# Patient Record
Sex: FEMALE | Race: WHITE | NOT HISPANIC OR LATINO | Employment: OTHER | URBAN - NONMETROPOLITAN AREA
[De-identification: names, ages, dates, MRNs, and addresses within clinical notes are randomized per-mention and may not be internally consistent; named-entity substitution may affect disease eponyms.]

---

## 2018-12-17 ENCOUNTER — APPOINTMENT (OUTPATIENT)
Dept: CT IMAGING | Facility: HOSPITAL | Age: 83
End: 2018-12-17

## 2018-12-17 ENCOUNTER — HOSPITAL ENCOUNTER (OUTPATIENT)
Facility: HOSPITAL | Age: 83
Setting detail: OBSERVATION
Discharge: HOME HEALTH | End: 2018-12-19
Attending: EMERGENCY MEDICINE | Admitting: EMERGENCY MEDICINE

## 2018-12-17 ENCOUNTER — APPOINTMENT (OUTPATIENT)
Dept: GENERAL RADIOLOGY | Facility: HOSPITAL | Age: 83
End: 2018-12-17

## 2018-12-17 DIAGNOSIS — R41.0 DISORIENTATION: ICD-10-CM

## 2018-12-17 DIAGNOSIS — N39.0 ACUTE UTI: Primary | ICD-10-CM

## 2018-12-17 LAB
A-A DO2: 20.4 MMHG (ref 0–300)
ALBUMIN SERPL-MCNC: 4.2 G/DL (ref 3.4–4.8)
ALBUMIN/GLOB SERPL: 1.7 G/DL (ref 1.5–2.5)
ALP SERPL-CCNC: 64 U/L (ref 35–104)
ALT SERPL W P-5'-P-CCNC: 17 U/L (ref 10–36)
ANION GAP SERPL CALCULATED.3IONS-SCNC: 6.1 MMOL/L (ref 3.6–11.2)
ARTERIAL PATENCY WRIST A: ABNORMAL
AST SERPL-CCNC: 26 U/L (ref 10–30)
ATMOSPHERIC PRESS: 730 MMHG
BACTERIA UR QL AUTO: ABNORMAL /HPF
BASE EXCESS BLDA CALC-SCNC: 1.4 MMOL/L
BASOPHILS # BLD AUTO: 0.01 10*3/MM3 (ref 0–0.3)
BASOPHILS NFR BLD AUTO: 0.1 % (ref 0–2)
BDY SITE: ABNORMAL
BILIRUB SERPL-MCNC: 1.3 MG/DL (ref 0.2–1.8)
BILIRUB UR QL STRIP: NEGATIVE
BNP SERPL-MCNC: 257 PG/ML (ref 0–100)
BODY TEMPERATURE: 98.6 C
BUN BLD-MCNC: 21 MG/DL (ref 7–21)
BUN/CREAT SERPL: 21.4 (ref 7–25)
CALCIUM SPEC-SCNC: 9.2 MG/DL (ref 7.7–10)
CHLORIDE SERPL-SCNC: 104 MMOL/L (ref 99–112)
CLARITY UR: ABNORMAL
CO2 SERPL-SCNC: 27.9 MMOL/L (ref 24.3–31.9)
COHGB MFR BLD: 2.7 % (ref 0–5)
COLOR UR: YELLOW
CREAT BLD-MCNC: 0.98 MG/DL (ref 0.43–1.29)
DEPRECATED RDW RBC AUTO: 45.1 FL (ref 37–54)
EOSINOPHIL # BLD AUTO: 0.01 10*3/MM3 (ref 0–0.7)
EOSINOPHIL NFR BLD AUTO: 0.1 % (ref 0–7)
ERYTHROCYTE [DISTWIDTH] IN BLOOD BY AUTOMATED COUNT: 13.7 % (ref 11.5–14.5)
GFR SERPL CREATININE-BSD FRML MDRD: 54 ML/MIN/1.73
GLOBULIN UR ELPH-MCNC: 2.5 GM/DL
GLUCOSE BLD-MCNC: 106 MG/DL (ref 70–110)
GLUCOSE UR STRIP-MCNC: NEGATIVE MG/DL
HCO3 BLDA-SCNC: 24.9 MMOL/L (ref 22–26)
HCT VFR BLD AUTO: 36.5 % (ref 37–47)
HCT VFR BLD CALC: 36 % (ref 37–47)
HETEROPH AB SER QL LA: NEGATIVE
HGB BLD-MCNC: 11.7 G/DL (ref 12–16)
HGB BLDA-MCNC: 12.3 G/DL (ref 12–16)
HGB UR QL STRIP.AUTO: NEGATIVE
HOROWITZ INDEX BLD+IHG-RTO: 21 %
HYALINE CASTS UR QL AUTO: ABNORMAL /LPF
IMM GRANULOCYTES # BLD: 0.01 10*3/MM3 (ref 0–0.03)
IMM GRANULOCYTES NFR BLD: 0.1 % (ref 0–0.5)
KETONES UR QL STRIP: ABNORMAL
LEUKOCYTE ESTERASE UR QL STRIP.AUTO: ABNORMAL
LYMPHOCYTES # BLD AUTO: 1.2 10*3/MM3 (ref 1–3)
LYMPHOCYTES NFR BLD AUTO: 15.1 % (ref 16–46)
MCH RBC QN AUTO: 30 PG (ref 27–33)
MCHC RBC AUTO-ENTMCNC: 32.1 G/DL (ref 33–37)
MCV RBC AUTO: 93.6 FL (ref 80–94)
METHGB BLD QL: 0.3 % (ref 0–3)
MODALITY: ABNORMAL
MONOCYTES # BLD AUTO: 0.78 10*3/MM3 (ref 0.1–0.9)
MONOCYTES NFR BLD AUTO: 9.8 % (ref 0–12)
NEUTROPHILS # BLD AUTO: 5.92 10*3/MM3 (ref 1.4–6.5)
NEUTROPHILS NFR BLD AUTO: 74.8 % (ref 40–75)
NITRITE UR QL STRIP: NEGATIVE
OSMOLALITY SERPL CALC.SUM OF ELEC: 279.1 MOSM/KG (ref 273–305)
OXYHGB MFR BLDV: 93.5 % (ref 85–100)
PCO2 BLDA: 35.6 MM HG (ref 35–45)
PH BLDA: 7.46 PH UNITS (ref 7.35–7.45)
PH UR STRIP.AUTO: 7 [PH] (ref 5–8)
PLATELET # BLD AUTO: 152 10*3/MM3 (ref 130–400)
PMV BLD AUTO: 10.3 FL (ref 6–10)
PO2 BLDA: 80.4 MM HG (ref 80–100)
POTASSIUM BLD-SCNC: 4 MMOL/L (ref 3.5–5.3)
PROT SERPL-MCNC: 6.7 G/DL (ref 6–8)
PROT UR QL STRIP: NEGATIVE
RBC # BLD AUTO: 3.9 10*6/MM3 (ref 4.2–5.4)
RBC # UR: ABNORMAL /HPF
REF LAB TEST METHOD: ABNORMAL
S PYO AG THROAT QL: NEGATIVE
SAO2 % BLDCOA: 96.4 % (ref 90–100)
SODIUM BLD-SCNC: 138 MMOL/L (ref 135–153)
SP GR UR STRIP: 1.02 (ref 1–1.03)
SQUAMOUS #/AREA URNS HPF: ABNORMAL /HPF
TROPONIN I SERPL-MCNC: 0.03 NG/ML
UROBILINOGEN UR QL STRIP: ABNORMAL
WBC NRBC COR # BLD: 7.93 10*3/MM3 (ref 4.5–12.5)
WBC UR QL AUTO: ABNORMAL /HPF

## 2018-12-17 PROCEDURE — 94799 UNLISTED PULMONARY SVC/PX: CPT

## 2018-12-17 PROCEDURE — 87040 BLOOD CULTURE FOR BACTERIA: CPT | Performed by: EMERGENCY MEDICINE

## 2018-12-17 PROCEDURE — G0378 HOSPITAL OBSERVATION PER HR: HCPCS

## 2018-12-17 PROCEDURE — 80053 COMPREHEN METABOLIC PANEL: CPT | Performed by: EMERGENCY MEDICINE

## 2018-12-17 PROCEDURE — 82375 ASSAY CARBOXYHB QUANT: CPT | Performed by: EMERGENCY MEDICINE

## 2018-12-17 PROCEDURE — 96365 THER/PROPH/DIAG IV INF INIT: CPT

## 2018-12-17 PROCEDURE — 87880 STREP A ASSAY W/OPTIC: CPT | Performed by: EMERGENCY MEDICINE

## 2018-12-17 PROCEDURE — 70450 CT HEAD/BRAIN W/O DYE: CPT | Performed by: RADIOLOGY

## 2018-12-17 PROCEDURE — 70450 CT HEAD/BRAIN W/O DYE: CPT

## 2018-12-17 PROCEDURE — 84484 ASSAY OF TROPONIN QUANT: CPT | Performed by: EMERGENCY MEDICINE

## 2018-12-17 PROCEDURE — 99284 EMERGENCY DEPT VISIT MOD MDM: CPT

## 2018-12-17 PROCEDURE — 81001 URINALYSIS AUTO W/SCOPE: CPT | Performed by: EMERGENCY MEDICINE

## 2018-12-17 PROCEDURE — 87081 CULTURE SCREEN ONLY: CPT | Performed by: EMERGENCY MEDICINE

## 2018-12-17 PROCEDURE — 85025 COMPLETE CBC W/AUTO DIFF WBC: CPT | Performed by: EMERGENCY MEDICINE

## 2018-12-17 PROCEDURE — 87086 URINE CULTURE/COLONY COUNT: CPT | Performed by: EMERGENCY MEDICINE

## 2018-12-17 PROCEDURE — 82805 BLOOD GASES W/O2 SATURATION: CPT | Performed by: EMERGENCY MEDICINE

## 2018-12-17 PROCEDURE — 36600 WITHDRAWAL OF ARTERIAL BLOOD: CPT | Performed by: EMERGENCY MEDICINE

## 2018-12-17 PROCEDURE — 83050 HGB METHEMOGLOBIN QUAN: CPT | Performed by: EMERGENCY MEDICINE

## 2018-12-17 PROCEDURE — 71046 X-RAY EXAM CHEST 2 VIEWS: CPT | Performed by: RADIOLOGY

## 2018-12-17 PROCEDURE — 83880 ASSAY OF NATRIURETIC PEPTIDE: CPT | Performed by: EMERGENCY MEDICINE

## 2018-12-17 PROCEDURE — 25010000002 CEFTRIAXONE: Performed by: EMERGENCY MEDICINE

## 2018-12-17 PROCEDURE — 93005 ELECTROCARDIOGRAM TRACING: CPT | Performed by: EMERGENCY MEDICINE

## 2018-12-17 PROCEDURE — 71046 X-RAY EXAM CHEST 2 VIEWS: CPT

## 2018-12-17 PROCEDURE — 86308 HETEROPHILE ANTIBODY SCREEN: CPT | Performed by: EMERGENCY MEDICINE

## 2018-12-17 RX ORDER — ATORVASTATIN CALCIUM 10 MG/1
5 TABLET, FILM COATED ORAL DAILY
Status: DISCONTINUED | OUTPATIENT
Start: 2018-12-18 | End: 2018-12-19 | Stop reason: HOSPADM

## 2018-12-17 RX ORDER — NITROGLYCERIN 0.4 MG/1
0.4 TABLET SUBLINGUAL
Status: DISCONTINUED | OUTPATIENT
Start: 2018-12-17 | End: 2018-12-19 | Stop reason: HOSPADM

## 2018-12-17 RX ORDER — ATORVASTATIN CALCIUM 10 MG/1
5 TABLET, FILM COATED ORAL DAILY
Status: CANCELLED | OUTPATIENT
Start: 2018-12-18

## 2018-12-17 RX ORDER — ATENOLOL 25 MG/1
25 TABLET ORAL DAILY
Status: CANCELLED | OUTPATIENT
Start: 2018-12-18

## 2018-12-17 RX ORDER — SODIUM CHLORIDE 0.9 % (FLUSH) 0.9 %
10 SYRINGE (ML) INJECTION AS NEEDED
Status: DISCONTINUED | OUTPATIENT
Start: 2018-12-17 | End: 2018-12-19 | Stop reason: HOSPADM

## 2018-12-17 RX ORDER — ONDANSETRON 4 MG/1
4 TABLET, ORALLY DISINTEGRATING ORAL EVERY 6 HOURS PRN
Status: DISCONTINUED | OUTPATIENT
Start: 2018-12-17 | End: 2018-12-19 | Stop reason: HOSPADM

## 2018-12-17 RX ORDER — SODIUM CHLORIDE 0.9 % (FLUSH) 0.9 %
3-10 SYRINGE (ML) INJECTION AS NEEDED
Status: DISCONTINUED | OUTPATIENT
Start: 2018-12-17 | End: 2018-12-19 | Stop reason: HOSPADM

## 2018-12-17 RX ORDER — ATENOLOL 25 MG/1
25 TABLET ORAL DAILY
Status: ON HOLD | COMMUNITY
End: 2019-02-08

## 2018-12-17 RX ORDER — ONDANSETRON 4 MG/1
4 TABLET, FILM COATED ORAL EVERY 6 HOURS PRN
Status: DISCONTINUED | OUTPATIENT
Start: 2018-12-17 | End: 2018-12-19 | Stop reason: HOSPADM

## 2018-12-17 RX ORDER — ATENOLOL 25 MG/1
25 TABLET ORAL DAILY
Status: DISCONTINUED | OUTPATIENT
Start: 2018-12-18 | End: 2018-12-19 | Stop reason: HOSPADM

## 2018-12-17 RX ORDER — ATORVASTATIN CALCIUM 10 MG/1
5 TABLET, FILM COATED ORAL DAILY
Status: ON HOLD | COMMUNITY
End: 2019-02-08

## 2018-12-17 RX ORDER — ONDANSETRON 2 MG/ML
4 INJECTION INTRAMUSCULAR; INTRAVENOUS EVERY 6 HOURS PRN
Status: DISCONTINUED | OUTPATIENT
Start: 2018-12-17 | End: 2018-12-19 | Stop reason: HOSPADM

## 2018-12-17 RX ORDER — ACETAMINOPHEN 325 MG/1
650 TABLET ORAL EVERY 4 HOURS PRN
Status: DISCONTINUED | OUTPATIENT
Start: 2018-12-17 | End: 2018-12-19 | Stop reason: HOSPADM

## 2018-12-17 RX ORDER — ATORVASTATIN CALCIUM 10 MG/1
10 TABLET, FILM COATED ORAL DAILY
COMMUNITY
End: 2018-12-17

## 2018-12-17 RX ORDER — SODIUM CHLORIDE 0.9 % (FLUSH) 0.9 %
3 SYRINGE (ML) INJECTION EVERY 12 HOURS SCHEDULED
Status: DISCONTINUED | OUTPATIENT
Start: 2018-12-17 | End: 2018-12-19 | Stop reason: HOSPADM

## 2018-12-17 RX ADMIN — CEFTRIAXONE 1 G: 1 INJECTION, POWDER, FOR SOLUTION INTRAMUSCULAR; INTRAVENOUS at 20:10

## 2018-12-17 NOTE — ED PROVIDER NOTES
Subjective   History of Present Illness  TRIAGE CHIEF COMPLAINT:   Chief Complaint   Patient presents with   • Altered Mental Status         HPI: Mary Lou Graves   is a 83 y.o. female   who presents to the emergency department complaining of confusion and increased pedal edema.  Patient with a history of CABG, pacemaker, hypertension, dyslipidemia, Alzheimer's dementia, takes Xarelto.  Patient is a very poor historian.  According to family she has been more confused than her baseline over the past 24 hours.  They have also noticed she has increased swelling of her feet and ankles bilaterally.  On review of systems patient reports a sore throat.  No report of any fevers, chest pain, dyspnea, cough, nausea, vomiting, diarrhea, dysuria.  No recent illness or trauma.            Review of Systems   All other systems reviewed and are negative.      Past Medical History:   Diagnosis Date   • Hyperlipidemia    • Hypertension        No Known Allergies    Past Surgical History:   Procedure Laterality Date   • HYSTERECTOMY         History reviewed. No pertinent family history.    Social History     Socioeconomic History   • Marital status:      Spouse name: Not on file   • Number of children: Not on file   • Years of education: Not on file   • Highest education level: Not on file   Tobacco Use   • Smoking status: Current Every Day Smoker     Packs/day: 0.50   Substance and Sexual Activity   • Alcohol use: No     Frequency: Never   • Drug use: No   • Sexual activity: Defer           Objective   Physical Exam        CONSTITUTIONAL: Awake, alert, appears elderly and frail but comfortable and non-toxic   HENT: Atraumatic, normocephalic, oral mucosa pink and moist, airway patent. Nares patent without drainage. External ears normal.  Very faint erythema of the posterior oropharynx without exudate or swelling  EYES: Conjunctiva clear, EOMI, PERRL   NECK: Trachea midline, non-tender, supple   CARDIOVASCULAR: Normal heart rate,  Normal rhythm, No murmurs, rubs, gallops   PULMONARY/CHEST: Clear to auscultation, no rhonchi, wheezes, or rales. Symmetrical breath sounds. Non-tender.   ABDOMINAL: Non-distended, soft, non-tender - no rebound or guarding. BS normal.   NEUROLOGIC: +dementia and confusion but otherwise non-focal, moving all four extremities, no gross sensory or motor deficits.   EXTREMITIES: No clubbing, cyanosis.  2+ pedal edema bilaterally  SKIN: Warm, Dry, No erythema, No rash     XR Chest 2 View    (Results Pending)   CT Head Without Contrast    (Results Pending)     CT head: No acute intracranial abnormality seen.  CXR: Hyperexpanded lungs with a lung nodule in the left lower lung field versus superimposed density from nipple.    EKG:  Paced rhythm, rate 70          Procedures           ED Course        ED COURSE / MEDICAL DECISION MAKING:   Nursing notes reviewed.    Patient with increased confusion above baseline and was found to have a UTI.  Family is concerned about bringing her home.  They worried that she will not take her medication as prescribed and they want to be sure her confusion is starting to improve.  Admitted to the observation unit.    DECISION TO DISCHARGE/ADMIT: see ED care timeline       Electronically signed by: Naveed Khoury MD, 12/17/2018 7:36 PM                [facility]  Final diagnoses:   Acute UTI   Disorientation            Naveed Khoury MD  12/1935

## 2018-12-18 ENCOUNTER — APPOINTMENT (OUTPATIENT)
Dept: ULTRASOUND IMAGING | Facility: HOSPITAL | Age: 83
End: 2018-12-18

## 2018-12-18 LAB
ALBUMIN SERPL-MCNC: 3.9 G/DL (ref 3.4–4.8)
ALBUMIN/GLOB SERPL: 1.6 G/DL (ref 1.5–2.5)
ALP SERPL-CCNC: 60 U/L (ref 35–104)
ALT SERPL W P-5'-P-CCNC: 11 U/L (ref 10–36)
ANION GAP SERPL CALCULATED.3IONS-SCNC: 5.9 MMOL/L (ref 3.6–11.2)
AST SERPL-CCNC: 23 U/L (ref 10–30)
BASOPHILS # BLD AUTO: 0.01 10*3/MM3 (ref 0–0.3)
BASOPHILS NFR BLD AUTO: 0.1 % (ref 0–2)
BILIRUB SERPL-MCNC: 1 MG/DL (ref 0.2–1.8)
BUN BLD-MCNC: 15 MG/DL (ref 7–21)
BUN/CREAT SERPL: 20 (ref 7–25)
CALCIUM SPEC-SCNC: 8.9 MG/DL (ref 7.7–10)
CHLORIDE SERPL-SCNC: 103 MMOL/L (ref 99–112)
CHOLEST SERPL-MCNC: 128 MG/DL (ref 0–200)
CO2 SERPL-SCNC: 27.1 MMOL/L (ref 24.3–31.9)
CREAT BLD-MCNC: 0.75 MG/DL (ref 0.43–1.29)
DEPRECATED RDW RBC AUTO: 44 FL (ref 37–54)
EOSINOPHIL # BLD AUTO: 0.02 10*3/MM3 (ref 0–0.7)
EOSINOPHIL NFR BLD AUTO: 0.3 % (ref 0–7)
ERYTHROCYTE [DISTWIDTH] IN BLOOD BY AUTOMATED COUNT: 13.3 % (ref 11.5–14.5)
GFR SERPL CREATININE-BSD FRML MDRD: 74 ML/MIN/1.73
GLOBULIN UR ELPH-MCNC: 2.5 GM/DL
GLUCOSE BLD-MCNC: 89 MG/DL (ref 70–110)
HCT VFR BLD AUTO: 36.7 % (ref 37–47)
HDLC SERPL-MCNC: 46 MG/DL (ref 60–100)
HGB BLD-MCNC: 11.9 G/DL (ref 12–16)
IMM GRANULOCYTES # BLD: 0.01 10*3/MM3 (ref 0–0.03)
IMM GRANULOCYTES NFR BLD: 0.1 % (ref 0–0.5)
LDLC SERPL CALC-MCNC: 66 MG/DL (ref 0–100)
LDLC/HDLC SERPL: 1.44 {RATIO}
LYMPHOCYTES # BLD AUTO: 1.44 10*3/MM3 (ref 1–3)
LYMPHOCYTES NFR BLD AUTO: 21 % (ref 16–46)
MCH RBC QN AUTO: 29.9 PG (ref 27–33)
MCHC RBC AUTO-ENTMCNC: 32.4 G/DL (ref 33–37)
MCV RBC AUTO: 92.2 FL (ref 80–94)
MONOCYTES # BLD AUTO: 0.73 10*3/MM3 (ref 0.1–0.9)
MONOCYTES NFR BLD AUTO: 10.6 % (ref 0–12)
NEUTROPHILS # BLD AUTO: 4.65 10*3/MM3 (ref 1.4–6.5)
NEUTROPHILS NFR BLD AUTO: 67.9 % (ref 40–75)
OSMOLALITY SERPL CALC.SUM OF ELEC: 272.3 MOSM/KG (ref 273–305)
PLATELET # BLD AUTO: 155 10*3/MM3 (ref 130–400)
PMV BLD AUTO: 9.9 FL (ref 6–10)
POTASSIUM BLD-SCNC: 3.7 MMOL/L (ref 3.5–5.3)
PROT SERPL-MCNC: 6.4 G/DL (ref 6–8)
RBC # BLD AUTO: 3.98 10*6/MM3 (ref 4.2–5.4)
SODIUM BLD-SCNC: 136 MMOL/L (ref 135–153)
TRIGL SERPL-MCNC: 78 MG/DL (ref 0–150)
TSH SERPL DL<=0.05 MIU/L-ACNC: 1.73 MIU/ML (ref 0.55–4.78)
VLDLC SERPL-MCNC: 15.6 MG/DL
WBC NRBC COR # BLD: 6.86 10*3/MM3 (ref 4.5–12.5)

## 2018-12-18 PROCEDURE — 96375 TX/PRO/DX INJ NEW DRUG ADDON: CPT

## 2018-12-18 PROCEDURE — 25010000002 LORAZEPAM PER 2 MG: Performed by: INTERNAL MEDICINE

## 2018-12-18 PROCEDURE — 96376 TX/PRO/DX INJ SAME DRUG ADON: CPT

## 2018-12-18 PROCEDURE — 84443 ASSAY THYROID STIM HORMONE: CPT | Performed by: PHYSICIAN ASSISTANT

## 2018-12-18 PROCEDURE — 80061 LIPID PANEL: CPT | Performed by: PHYSICIAN ASSISTANT

## 2018-12-18 PROCEDURE — G0378 HOSPITAL OBSERVATION PER HR: HCPCS

## 2018-12-18 PROCEDURE — 85025 COMPLETE CBC W/AUTO DIFF WBC: CPT | Performed by: PHYSICIAN ASSISTANT

## 2018-12-18 PROCEDURE — 93880 EXTRACRANIAL BILAT STUDY: CPT | Performed by: RADIOLOGY

## 2018-12-18 PROCEDURE — 80053 COMPREHEN METABOLIC PANEL: CPT | Performed by: PHYSICIAN ASSISTANT

## 2018-12-18 PROCEDURE — 94799 UNLISTED PULMONARY SVC/PX: CPT

## 2018-12-18 PROCEDURE — 93880 EXTRACRANIAL BILAT STUDY: CPT

## 2018-12-18 RX ORDER — CEFDINIR 300 MG/1
300 CAPSULE ORAL EVERY 12 HOURS SCHEDULED
Status: DISCONTINUED | OUTPATIENT
Start: 2018-12-18 | End: 2018-12-19 | Stop reason: HOSPADM

## 2018-12-18 RX ORDER — LORAZEPAM 2 MG/ML
0.5 INJECTION INTRAMUSCULAR ONCE
Status: COMPLETED | OUTPATIENT
Start: 2018-12-18 | End: 2018-12-18

## 2018-12-18 RX ORDER — HALOPERIDOL 2 MG/1
2 TABLET ORAL 2 TIMES DAILY PRN
Status: DISCONTINUED | OUTPATIENT
Start: 2018-12-18 | End: 2018-12-19 | Stop reason: HOSPADM

## 2018-12-18 RX ADMIN — SODIUM CHLORIDE, PRESERVATIVE FREE 3 ML: 5 INJECTION INTRAVENOUS at 19:37

## 2018-12-18 RX ADMIN — RIVAROXABAN 20 MG: 20 TABLET, FILM COATED ORAL at 18:20

## 2018-12-18 RX ADMIN — RIVAROXABAN 20 MG: 20 TABLET, FILM COATED ORAL at 00:07

## 2018-12-18 RX ADMIN — HALOPERIDOL 2 MG: 2 TABLET ORAL at 22:11

## 2018-12-18 RX ADMIN — ATENOLOL 25 MG: 25 TABLET ORAL at 09:03

## 2018-12-18 RX ADMIN — ATORVASTATIN CALCIUM 5 MG: 10 TABLET, FILM COATED ORAL at 09:03

## 2018-12-18 RX ADMIN — LORAZEPAM 0.5 MG: 2 INJECTION INTRAMUSCULAR; INTRAVENOUS at 00:44

## 2018-12-18 RX ADMIN — SODIUM CHLORIDE, PRESERVATIVE FREE 3 ML: 5 INJECTION INTRAVENOUS at 09:04

## 2018-12-18 RX ADMIN — SODIUM CHLORIDE, PRESERVATIVE FREE 3 ML: 5 INJECTION INTRAVENOUS at 00:08

## 2018-12-18 RX ADMIN — LORAZEPAM 0.5 MG: 2 INJECTION INTRAMUSCULAR; INTRAVENOUS at 19:36

## 2018-12-18 RX ADMIN — CEFDINIR 300 MG: 300 CAPSULE ORAL at 19:36

## 2018-12-18 NOTE — PROGRESS NOTES
"Discharge Planning Assessment   [facility]     Patient Name: Mary Lou Graves  MRN: [id number]  Today's Date: 12/18/2018    Admit Date: 12/17/2018    Discharge Needs Assessment     Row Name 12/18/18 1431       Living Environment    Lives With  spouse Pt lives at home with her ex-, Maria.     Current Living Arrangements  home/apartment/condo    Primary Care Provided by  self    Family Caregiver if Needed  child(carmen), adult;spouse    Family Caregiver Names  -- Pt's , Maria and pt's sonDevendra assist in caring for pt.     Quality of Family Relationships  supportive;involved;helpful    Able to Return to Prior Arrangements  yes       Transition Planning    Patient/Family Anticipates Transition to  home    Transportation Anticipated  family or friend will provide       Discharge Needs Assessment    Equipment Currently Used at Home  none    Equipment Needed After Discharge  none    Outpatient/Agency/Support Group Needs  -- Pt does not currently utilize home health services, but will need them arranged at discharge. Pt lives in [facility].         Discharge Plan     Row Name 12/18/18 1439       Plan    Plan  Pt lives at home with her ex-, Maria and plans to return home at discharge. Pt does not currently utilize home health services, but will need them arranged at discharge. Pt lives in [facility] ([address], [place]). Home health to be arranged with MD order. Pt does not utilizes any DME. Pt utilizes [facility] in [place] for prescriptions. Pt's son to provide transportation at discharge. SS will follow and assist as needed with discharge planning.     Patient/Family in Agreement with Plan  yes     Demographic Summary     Row Name 12/18/18 1431       General Information    Admission Type  observation    Referral Source  nursing    Reason for Consult  -- \"82 y/o. DC planning.\"    Preferred Language  English     Used During This Interaction  no     Amee " Aide Restrepo

## 2018-12-18 NOTE — H&P
HISTORY AND PHYSICAL        Patient Identification:  Name:  Mary Lou Graves  Age:  83 y.o.  Sex:  female  :  1935  MRN:  [id number]   Visit Number:  [id number]  Primary Care Physician:  Provider, No Known       Subjective     Subjective     Chief complaint:     Chief Complaint   Patient presents with   • Altered Mental Status       History of presenting illness:     The patient is an 83 year old female with history of history of CABG, pacemaker, hypertension, dyslipidemia, Alzheimer's dementia, takes Xarelto for a fib. She was brought to the ED by family for increased confusion. Found to have a UTI and initiated on Rocephin. Admitted to the observation unit for further evaluation and UTI treatment. VSS. Normal white count. Went into A fib overnight and converted back to sinus rhythm. On anticoagulation with Xarelto. Comfortable this morning with no complaints. Unfortunately unable to provide HPI or ROS.       ---------------------------------------------------------------------------------------------------------------------     Review Of Systems:    Unable to obtain as the patient is confused.  ---------------------------------------------------------------------------------------------------------------------     Past Medical History    Past Medical History:   Diagnosis Date   • Hyperlipidemia    • Hypertension        Past Surgical History    Past Surgical History:   Procedure Laterality Date   • HYSTERECTOMY         Family History    History reviewed. No pertinent family history.    Social History    Social History     Tobacco Use   • Smoking status: Current Every Day Smoker     Packs/day: 0.50   Substance Use Topics   • Alcohol use: No     Frequency: Never   • Drug use: No       Allergies    Patient has no known allergies.  ---------------------------------------------------------------------------------------------------------------------     Home Medications:    Prior to Admission Medications      Prescriptions Last Dose Informant Patient Reported? Taking?    atenolol (TENORMIN) 25 MG tablet 12/16/2018 Medication Bottle Yes Yes    Take 25 mg by mouth Daily.    atorvastatin (LIPITOR) 5 MG half tablet 12/16/2018 Medication Bottle Yes Yes    Take 5 mg by mouth Daily.    rivaroxaban (XARELTO) 20 MG tablet 12/16/2018 Medication Bottle Yes Yes    Take 20 mg by mouth Daily With Dinner.        ---------------------------------------------------------------------------------------------------------------------    Objective     Objective     Hospital Scheduled Meds:    atenolol 25 mg Oral Daily   atorvastatin 5 mg Oral Daily   rivaroxaban 20 mg Oral Daily With Dinner   sodium chloride 3 mL Intravenous Q12H        ---------------------------------------------------------------------------------------------------------------------   Vital Signs:  Temp:  [97.7 °F (36.5 °C)-98.1 °F (36.7 °C)] 97.8 °F (36.6 °C)  Heart Rate:  [61-97] 61  Resp:  [18] 18  BP: (116-167)/(50-84) 116/50  Mean Arterial Pressure (Non-Invasive) for the past 24 hrs (Last 3 readings):   Noninvasive MAP (mmHg)   12/18/18 0407 93   12/18/18 0000 96   12/17/18 2024 105     SpO2 Percentage    12/18/18 0407 12/18/18 0750 12/18/18 0759   SpO2: 95% 96% 90%     SpO2:  [84 %-100 %] 90 %  on   ;   Device (Oxygen Therapy): room air    Body mass index is 13.92 kg/m².  Wt Readings from Last 3 Encounters:   12/17/18 44 kg (97 lb)     ---------------------------------------------------------------------------------------------------------------------     Physical Exam:    Constitutional:  Well-developed and well-nourished.  No respiratory distress.      HENT:  Head: Normocephalic and atraumatic.  Mouth:  Moist mucous membranes.    Eyes:  Conjunctivae and EOM are normal.  No scleral icterus.  Neck:  Neck supple.  No JVD present.    Cardiovascular:  Normal rate, regular rhythm and normal heart sounds with no murmur. No edema.  Pulmonary/Chest:  No respiratory  distress, no wheezes, no crackles, with normal breath sounds and good air movement.  Abdominal:  Soft.  Bowel sounds are normal.  No distension and no tenderness.   Musculoskeletal:  No edema, no tenderness, and no deformity.  No swelling or redness of joints.  Neurological:  Alert and oriented to person, place, and time.  No facial droop.  No slurred speech.   Skin:  Skin is warm and dry.  No rash noted.  No pallor.   Psychiatric:  Normal mood and affect.  Behavior is normal.    ---------------------------------------------------------------------------------------------------------------------  I have personally reviewed the EKG/Telemetry strip  ---------------------------------------------------------------------------------------------------------------------   Results from last 7 days   Lab Units  12/17/18   1629   TROPONIN I ng/mL  0.028       Results from last 7 days   Lab Units  12/18/18   0050   CHOLESTEROL mg/dL  128   TRIGLYCERIDES mg/dL  78   HDL CHOL mg/dL  46*   LDL CHOL mg/dL  66     Results from last 7 days   Lab Units  12/17/18   1619   PH, ARTERIAL pH units  7.462*   PO2 ART mm Hg  80.4   PCO2, ARTERIAL mm Hg  35.6   HCO3 ART mmol/L  24.9     Results from last 7 days   Lab Units  12/18/18   0050  12/17/18   1629   WBC 10*3/mm3  6.86  7.93   HEMOGLOBIN g/dL  11.9*  11.7*   HEMATOCRIT %  36.7*  36.5*   MCV fL  92.2  93.6   MCHC g/dL  32.4*  32.1*   PLATELETS 10*3/mm3  155  152     Results from last 7 days   Lab Units  12/18/18   0050  12/17/18   1629   SODIUM mmol/L  136  138   POTASSIUM mmol/L  3.7  4.0   CHLORIDE mmol/L  103  104   CO2 mmol/L  27.1  27.9   BUN mg/dL  15  21   CREATININE mg/dL  0.75  0.98   EGFR IF NONAFRICN AM mL/min/1.73  74  54*   CALCIUM mg/dL  8.9  9.2   GLUCOSE mg/dL  89  106   ALBUMIN g/dL  3.90  4.20   BILIRUBIN mg/dL  1.0  1.3   ALK PHOS U/L  60  64   AST (SGOT) U/L  23  26   ALT (SGPT) U/L  11  17   Estimated Creatinine Clearance: 38.3 mL/min (by C-G formula based on SCr  of 0.75 mg/dL).  No results found for: AMMONIA    No results found for: HGBA1C, POCGLU  No results found for: HGBA1C  Lab Results   Component Value Date    TSH 1.732 12/18/2018       Blood Culture   Date Value Ref Range Status   12/17/2018 No growth at less than 24 hours  Preliminary   12/17/2018 No growth at less than 24 hours  Preliminary     Urine Culture   Date Value Ref Range Status   12/17/2018 Normal Urogenital Kimberly  Preliminary              Pain Management Panel     There is no flowsheet data to display.        I have personally reviewed the above laboratory results.   ---------------------------------------------------------------------------------------------------------------------  Imaging Results (last 7 days)     Procedure Component Value Units Date/Time    CT Head Without Contrast [[id number]] Updated:  12/17/18 1649    XR Chest 2 View [[id number]] Updated:  12/17/18 1646        I have personally reviewed the above radiology results.   ---------------------------------------------------------------------------------------------------------------------      Assessment & Plan        Assessment/Plan       ASSESSMENT:    1. Altered mental status   2. UTI    PLAN:    Admitted to the observation unit for further evaluation and UTI treatment. VSS. Normal white count. Went into A fib overnight and converted back to sinus rhythm. On anticoagulation with Xarelto.     Initiated Omnicef 300 mg PO BID x 5 days for UTI coverage.    Continue telemetry monitoring closely for a fibb.     Carotid ultrasound bilaterally ordered.     Patient's findings and recommendations were discussed with patient and nursing staff      Code Status:   Code Status and Medical Interventions:   Ordered at: 12/17/18 1946     Code Status:    CPR     Medical Interventions (Level of Support Prior to Arrest):    Full           Fernanda Al PA-C  12/18/18  9:12 AM

## 2018-12-18 NOTE — NURSING NOTE
Notified Inocencio Verma to make provider aware of patient currently in a-fib. Pt since has converted to normal sinus in the 60s.

## 2018-12-18 NOTE — ED NOTES
Called Fernanda from obs unit for Dr Khoury. He is on the phone with her at this time.     Mayra Medrano  12/17/18 1933

## 2018-12-18 NOTE — PLAN OF CARE
Problem: Fall Risk (Adult)  Goal: Identify Related Risk Factors and Signs and Symptoms   12/18/18 0217   Fall Risk (Adult)   Related Risk Factors (Fall Risk) age-related changes;gait/mobility problems;environment unfamiliar;inadequate lighting;confusion/agitation     Goal: Absence of Fall  Outcome: Ongoing (interventions implemented as appropriate)   12/18/18 1027   Fall Risk (Adult)   Absence of Fall making progress toward outcome       Problem: Urinary Tract Infection (Adult)  Goal: Signs and Symptoms of Listed Potential Problems Will be Absent, Minimized or Managed (Urinary Tract Infection)  Outcome: Ongoing (interventions implemented as appropriate)   12/18/18 1027   Goal/Outcome Evaluation   Problems Assessed (Urinary Tract Infection) all   Problems Present (UTI) none       Problem: Confusion, Acute (Adult)  Goal: Identify Related Risk Factors and Signs and Symptoms  Outcome: Ongoing (interventions implemented as appropriate)   12/18/18 1027   Confusion, Acute (Adult)   Related Risk Factors (Acute Confusion) advanced age   Signs and Symptoms (Acute Confusion) memory disturbed;disorientation     Goal: Cognitive/Functional Impairments Minimized  Outcome: Ongoing (interventions implemented as appropriate)   12/18/18 1027   Confusion, Acute (Adult)   Cognitive/Functional Impairments Minimized making progress toward outcome     Goal: Safety  Outcome: Ongoing (interventions implemented as appropriate)   12/18/18 1027   Confusion, Acute (Adult)   Safety making progress toward outcome       Problem: Patient Care Overview  Goal: Plan of Care Review  Outcome: Ongoing (interventions implemented as appropriate)   12/18/18 0930   Coping/Psychosocial   Plan of Care Reviewed With patient       Problem: Skin Injury Risk (Adult)  Goal: Identify Related Risk Factors and Signs and Symptoms  Outcome: Ongoing (interventions implemented as appropriate)   12/18/18 1027   Skin Injury Risk (Adult)   Related Risk Factors (Skin Injury  Risk) advanced age;infection     Goal: Skin Health and Integrity  Outcome: Ongoing (interventions implemented as appropriate)   12/18/18 1027   Skin Injury Risk (Adult)   Skin Health and Integrity making progress toward outcome

## 2018-12-18 NOTE — PROGRESS NOTES
Malnutrition Severity Assessment    Patient Name:  Mary Lou Graves  YOB: 1935  MRN: [id number]  Admit Date:  12/17/2018    Patient meets criteria for : Severe malnutrition    Comments:  Please review and attest if agree with RD assessment. Thank you.     Malnutrition Type: Chronic Illness Malnutrition     Malnutrition Type (last 8 hours)      Malnutrition Severity Assessment     Row Name 12/18/18 1613       Malnutrition Severity Assessment    Malnutrition Type  Chronic Illness Malnutrition    Row Name 12/18/18 1613       Physical Signs of Malnutrition (Chronic)    Muscle Wasting  Severe    Fat Loss  Severe    Fluid Accumulation  None    Row Name 12/18/18 1613       Weight Status (Chronic)    BMI  Severe (<16)    %IBW  Severe (<70%)    Weight Loss  -- no information recorded    Row Name 12/18/18 1613       Energy Intake Status (Chronic)    Energy Intake  Mild (<75% / 5d)    Row Name 12/18/18 1613       Criteria Met (Must meet criteria for severity in at least 2 of these categories: M Wasting, Fat Loss, Fluid, Secondary Signs, Wt. Status, Intake)    Patient meets criteria for   Severe malnutrition          Electronically signed by:  Jeannette Ojeda RD  12/18/18 4:14 PM

## 2018-12-19 VITALS
HEIGHT: 70 IN | HEART RATE: 69 BPM | RESPIRATION RATE: 18 BRPM | DIASTOLIC BLOOD PRESSURE: 62 MMHG | OXYGEN SATURATION: 96 % | SYSTOLIC BLOOD PRESSURE: 135 MMHG | BODY MASS INDEX: 13.89 KG/M2 | TEMPERATURE: 97.7 F | WEIGHT: 97 LBS

## 2018-12-19 LAB
BACTERIA SPEC AEROBE CULT: NORMAL
BACTERIA SPEC AEROBE CULT: NORMAL

## 2018-12-19 PROCEDURE — 96376 TX/PRO/DX INJ SAME DRUG ADON: CPT

## 2018-12-19 PROCEDURE — 25010000002 LORAZEPAM PER 2 MG: Performed by: INTERNAL MEDICINE

## 2018-12-19 PROCEDURE — G0378 HOSPITAL OBSERVATION PER HR: HCPCS

## 2018-12-19 RX ORDER — CEFDINIR 300 MG/1
300 CAPSULE ORAL EVERY 12 HOURS SCHEDULED
Qty: 7 CAPSULE | Refills: 0 | Status: SHIPPED | OUTPATIENT
Start: 2018-12-19 | End: 2018-12-23

## 2018-12-19 RX ORDER — LORAZEPAM 2 MG/ML
1 INJECTION INTRAMUSCULAR ONCE
Status: COMPLETED | OUTPATIENT
Start: 2018-12-19 | End: 2018-12-19

## 2018-12-19 RX ADMIN — ATORVASTATIN CALCIUM 5 MG: 10 TABLET, FILM COATED ORAL at 11:10

## 2018-12-19 RX ADMIN — CEFDINIR 300 MG: 300 CAPSULE ORAL at 11:10

## 2018-12-19 RX ADMIN — LORAZEPAM 1 MG: 2 INJECTION INTRAMUSCULAR; INTRAVENOUS at 00:26

## 2018-12-19 RX ADMIN — SODIUM CHLORIDE, PRESERVATIVE FREE 3 ML: 5 INJECTION INTRAVENOUS at 11:10

## 2018-12-19 RX ADMIN — ATENOLOL 25 MG: 25 TABLET ORAL at 11:10

## 2018-12-19 NOTE — DISCHARGE PLACEMENT REQUEST
"Mary Lou Graves (83 y.o. Female)     Date of Birth Social Security Number Address Home Phone MRN    1935  [address]  [place] [id number] [phone] [id number]    Faith Marital Status          None        Admission Date Admission Type Admitting Provider Attending Provider Department, Room/Bed    12/17/18 Emergency Nicole Valdes MD  [facility] OBSERVATION UNIT, 215/1S    Discharge Date Discharge Disposition Discharge Destination        12/19/2018 Home-Health Care Svc              Attending Provider:  (none)   Allergies:  No Known Allergies    Isolation:  None   Infection:  None   Code Status:  CPR    Ht:  177.8 cm (70\")   Wt:  44 kg (97 lb)    Admission Cmt:  None   Principal Problem:  None                Active Insurance as of 12/17/2018     Primary Coverage     Payor Plan Insurance Group Employer/Plan Group    MEDICARE MEDICARE A & B      Payor Plan Address Payor Plan Phone Number Payor Plan Fax Number Effective Dates    [address] [phone]  1/1/1989 - None Entered    [place] [address]       Subscriber Name Subscriber Birth Date Member ID       MARY LOU GRAVES 1935 [id number]                 Emergency Contacts      (Rel.) Home Phone Work Phone Mobile Phone    LEONIDES GRAVES (Son) -- -- [phone]    RACHAEL GRAVES (Spouse) -- -- [phone]            Emergency Contact Information     Name Relation Home Work Mobile    LEONIDES GRAVES Son   [phone]    RACHAEL GRAVES Spouse   [phone]          Insurance Information                MEDICARE/MEDICARE A & B Phone: [phone]    Subscriber: Mary Lou Graves Subscriber#: [id number]    Group#:  Precert#:           Treatment Team     Provider Relationship Specialty Contact    Luz Chu, DIANELYS Respiratory Therapist --  [phone]    Salome Taylor, RN Registered Nurse --      Carlie Gaines Certified Nursing Assistant --      Kate Hui RN Case Manager --      " Nicole Valdes MD Consulting Physician, Physician of Record Infectious Diseases  [phone]    Tony Villatoro, LEYLA Registered Nurse --      [facility] [place] --            Problem List           Codes Noted - Resolved       Hospital    Acute UTI ICD-10-CM: N39.0  ICD-9-CM: 599.0 2018 - Present             History & Physical      Fernanda Al PA-C at 2018  9:12 AM                   HISTORY AND PHYSICAL        Patient Identification:  Name:  Mary Lou Graves  Age:  83 y.o.  Sex:  female  :  1935  MRN:  [id number]   Visit Number:  [id number]  Primary Care Physician:  Provider, No Known       Subjective     Subjective     Chief complaint:     Chief Complaint   Patient presents with   • Altered Mental Status       History of presenting illness:     The patient is an 83 year old female with history of history of CABG, pacemaker, hypertension, dyslipidemia, Alzheimer's dementia, takes Xarelto for a fib. She was brought to the ED by family for increased confusion. Found to have a UTI and initiated on Rocephin. Admitted to the observation unit for further evaluation and UTI treatment. VSS. Normal white count. Went into A fib overnight and converted back to sinus rhythm. On anticoagulation with Xarelto. Comfortable this morning with no complaints. Unfortunately unable to provide HPI or ROS.       ---------------------------------------------------------------------------------------------------------------------     Review Of Systems:    Unable to obtain as the patient is confused.  ---------------------------------------------------------------------------------------------------------------------     Past Medical History    Past Medical History:   Diagnosis Date   • Hyperlipidemia    • Hypertension        Past Surgical History    Past Surgical History:   Procedure Laterality Date   • HYSTERECTOMY         Family History    History reviewed. No pertinent family  history.    Social History    Social History     Tobacco Use   • Smoking status: Current Every Day Smoker     Packs/day: 0.50   Substance Use Topics   • Alcohol use: No     Frequency: Never   • Drug use: No       Allergies    Patient has no known allergies.  ---------------------------------------------------------------------------------------------------------------------     Home Medications:    Prior to Admission Medications     Prescriptions Last Dose Informant Patient Reported? Taking?    atenolol (TENORMIN) 25 MG tablet 12/16/2018 Medication Bottle Yes Yes    Take 25 mg by mouth Daily.    atorvastatin (LIPITOR) 5 MG half tablet 12/16/2018 Medication Bottle Yes Yes    Take 5 mg by mouth Daily.    rivaroxaban (XARELTO) 20 MG tablet 12/16/2018 Medication Bottle Yes Yes    Take 20 mg by mouth Daily With Dinner.        ---------------------------------------------------------------------------------------------------------------------    Objective     Objective     Hospital Scheduled Meds:    atenolol 25 mg Oral Daily   atorvastatin 5 mg Oral Daily   rivaroxaban 20 mg Oral Daily With Dinner   sodium chloride 3 mL Intravenous Q12H        ---------------------------------------------------------------------------------------------------------------------   Vital Signs:  Temp:  [97.7 °F (36.5 °C)-98.1 °F (36.7 °C)] 97.8 °F (36.6 °C)  Heart Rate:  [61-97] 61  Resp:  [18] 18  BP: (116-167)/(50-84) 116/50  Mean Arterial Pressure (Non-Invasive) for the past 24 hrs (Last 3 readings):   Noninvasive MAP (mmHg)   12/18/18 0407 93   12/18/18 0000 96   12/17/18 2024 105     SpO2 Percentage    12/18/18 0407 12/18/18 0750 12/18/18 0759   SpO2: 95% 96% 90%     SpO2:  [84 %-100 %] 90 %  on   ;   Device (Oxygen Therapy): room air    Body mass index is 13.92 kg/m².  Wt Readings from Last 3 Encounters:   12/17/18 44 kg (97 lb)      ---------------------------------------------------------------------------------------------------------------------     Physical Exam:    Constitutional:  Well-developed and well-nourished.  No respiratory distress.      HENT:  Head: Normocephalic and atraumatic.  Mouth:  Moist mucous membranes.    Eyes:  Conjunctivae and EOM are normal.  No scleral icterus.  Neck:  Neck supple.  No JVD present.    Cardiovascular:  Normal rate, regular rhythm and normal heart sounds with no murmur. No edema.  Pulmonary/Chest:  No respiratory distress, no wheezes, no crackles, with normal breath sounds and good air movement.  Abdominal:  Soft.  Bowel sounds are normal.  No distension and no tenderness.   Musculoskeletal:  No edema, no tenderness, and no deformity.  No swelling or redness of joints.  Neurological:  Alert and oriented to person, place, and time.  No facial droop.  No slurred speech.   Skin:  Skin is warm and dry.  No rash noted.  No pallor.   Psychiatric:  Normal mood and affect.  Behavior is normal.    ---------------------------------------------------------------------------------------------------------------------  I have personally reviewed the EKG/Telemetry strip  ---------------------------------------------------------------------------------------------------------------------   Results from last 7 days   Lab Units  12/17/18   1629   TROPONIN I ng/mL  0.028       Results from last 7 days   Lab Units  12/18/18   0050   CHOLESTEROL mg/dL  128   TRIGLYCERIDES mg/dL  78   HDL CHOL mg/dL  46*   LDL CHOL mg/dL  66     Results from last 7 days   Lab Units  12/17/18   1619   PH, ARTERIAL pH units  7.462*   PO2 ART mm Hg  80.4   PCO2, ARTERIAL mm Hg  35.6   HCO3 ART mmol/L  24.9     Results from last 7 days   Lab Units  12/18/18   0050  12/17/18   1629   WBC 10*3/mm3  6.86  7.93   HEMOGLOBIN g/dL  11.9*  11.7*   HEMATOCRIT %  36.7*  36.5*   MCV fL  92.2  93.6   MCHC g/dL  32.4*  32.1*   PLATELETS 10*3/mm3  155  152      Results from last 7 days   Lab Units  12/18/18   0050  12/17/18   1629   SODIUM mmol/L  136  138   POTASSIUM mmol/L  3.7  4.0   CHLORIDE mmol/L  103  104   CO2 mmol/L  27.1  27.9   BUN mg/dL  15  21   CREATININE mg/dL  0.75  0.98   EGFR IF NONAFRICN AM mL/min/1.73  74  54*   CALCIUM mg/dL  8.9  9.2   GLUCOSE mg/dL  89  106   ALBUMIN g/dL  3.90  4.20   BILIRUBIN mg/dL  1.0  1.3   ALK PHOS U/L  60  64   AST (SGOT) U/L  23  26   ALT (SGPT) U/L  11  17   Estimated Creatinine Clearance: 38.3 mL/min (by C-G formula based on SCr of 0.75 mg/dL).  No results found for: AMMONIA    No results found for: HGBA1C, POCGLU  No results found for: HGBA1C  Lab Results   Component Value Date    TSH 1.732 12/18/2018       Blood Culture   Date Value Ref Range Status   12/17/2018 No growth at less than 24 hours  Preliminary   12/17/2018 No growth at less than 24 hours  Preliminary     Urine Culture   Date Value Ref Range Status   12/17/2018 Normal Urogenital Kimberly  Preliminary              Pain Management Panel     There is no flowsheet data to display.        I have personally reviewed the above laboratory results.   ---------------------------------------------------------------------------------------------------------------------  Imaging Results (last 7 days)     Procedure Component Value Units Date/Time    CT Head Without Contrast [[id number]] Updated:  12/17/18 1649    XR Chest 2 View [[id number]] Updated:  12/17/18 4596        I have personally reviewed the above radiology results.   ---------------------------------------------------------------------------------------------------------------------      Assessment & Plan        Assessment/Plan       ASSESSMENT:    1. Altered mental status   2. UTI    PLAN:    Admitted to the observation unit for further evaluation and UTI treatment. VSS. Normal white count. Went into A fib overnight and converted back to sinus rhythm. On anticoagulation with Xarelto.     Initiated Omnicef 300  mg PO BID x 5 days for UTI coverage.    Continue telemetry monitoring closely for a fibb.     Carotid ultrasound bilaterally ordered.     Patient's findings and recommendations were discussed with patient and nursing staff      Code Status:   Code Status and Medical Interventions:   Ordered at: 12/17/18 1946     Code Status:    CPR     Medical Interventions (Level of Support Prior to Arrest):    Full           Fernanda Al PA-C  12/18/18  9:12 AM      Electronically signed by Nicole Valdes MD at 12/19/2018  6:25 AM       ICU Vital Signs     Row Name 12/19/18 1149 12/19/18 0748 12/19/18 0720 12/19/18 0025 12/18/18 1945       Vitals    Temp  97.7 °F (36.5 °C)  97.7 °F (36.5 °C)  --  --  --    Temp src  Axillary  Oral  --  --  --    Pulse  69  64  --  69  --    Heart Rate Source  Monitor  Monitor  --  Monitor  --    Resp  18 18  --  18  --    Resp Rate Source  Visual  Visual  --  Visual  --    BP  135/62  108/60  --  161/78  --    Noninvasive MAP (mmHg)  77  72  --  94  --    BP Location  Left arm  Left arm  --  Left arm  --    BP Method  Automatic  Automatic  --  Automatic  --    Patient Position  Lying  Lying  --  Lying  --       Oxygen Therapy    SpO2  96 %  95 %  --  95 %  --    Pulse Oximetry Type  Intermittent  Continuous  --  --  --    Device (Oxygen Therapy)  room air  room air  room air  --  room air    Row Name 12/18/18 1900 12/18/18 1600                Vitals    Temp  97.8 °F (36.6 °C)  97.4 °F (36.3 °C)       Temp src  Oral  Oral       Pulse  61  62       Heart Rate Source  Monitor  Monitor       Resp  18 18       Resp Rate Source  Visual  Visual       BP  163/63  129/65       Noninvasive MAP (mmHg)  87  86       BP Location  Left arm  Left arm       BP Method  Automatic  Automatic       Patient Position  Sitting  Lying          Oxygen Therapy    SpO2  94 %  97 %           Intake & Output (last day)       12/18 0701 - 12/19 0700 12/19 0701 - 12/20 0700    P.O. 240     Total Intake(mL/kg) 240  "(5.5)     Net +240           Urine Unmeasured Occurrence 1 x         Hospital Medications (active)       Dose Frequency Start End    acetaminophen (TYLENOL) tablet 650 mg 650 mg Every 4 Hours PRN 12/17/2018     Sig - Route: Take 2 tablets by mouth Every 4 (Four) Hours As Needed for Mild Pain . - Oral    Cosign for Ordering: Accepted by Nicole Valdes MD on 12/18/2018  9:27 AM    atenolol (TENORMIN) tablet 25 mg 25 mg Daily 12/18/2018     Sig - Route: Take 1 tablet by mouth Daily. - Oral    atorvastatin (LIPITOR) tablet 5 mg 5 mg Daily 12/18/2018     Sig - Route: Take 0.5 tablets by mouth Daily. - Oral    cefdinir (OMNICEF) capsule 300 mg 300 mg Every 12 Hours Scheduled 12/18/2018 12/23/2018    Sig - Route: Take 1 capsule by mouth Every 12 (Twelve) Hours. - Oral    haloperidol (HALDOL) tablet 2 mg 2 mg 2 Times Daily PRN 12/18/2018     Sig - Route: Take 1 tablet by mouth 2 (Two) Times a Day As Needed for Agitation. - Oral    LORazepam (ATIVAN) injection 0.5 mg 0.5 mg Once 12/18/2018 12/18/2018    Sig - Route: Infuse 0.25 mL into a venous catheter 1 (One) Time. - Intravenous    LORazepam (ATIVAN) injection 1 mg 1 mg Once 12/19/2018 12/19/2018    Sig - Route: Infuse 0.5 mL into a venous catheter 1 (One) Time. - Intravenous    nitroglycerin (NITROSTAT) SL tablet 0.4 mg 0.4 mg Every 5 Minutes PRN 12/17/2018     Sig - Route: Place 1 tablet under the tongue Every 5 (Five) Minutes As Needed for Chest Pain (Chest Pain With Systolic Blood Pressure Greater Than 100). - Sublingual    Cosign for Ordering: Accepted by Nicole Valdes MD on 12/18/2018  9:27 AM    ondansetron (ZOFRAN) injection 4 mg 4 mg Every 6 Hours PRN 12/17/2018     Sig - Route: Infuse 2 mL into a venous catheter Every 6 (Six) Hours As Needed for Nausea or Vomiting. - Intravenous    Cosign for Ordering: Accepted by Nicole Valdes MD on 12/18/2018  9:27 AM    Linked Group 1:  \"Or\" Linked Group Details        ondansetron (ZOFRAN) tablet 4 mg 4 mg " "Every 6 Hours PRN 12/17/2018     Sig - Route: Take 1 tablet by mouth Every 6 (Six) Hours As Needed for Nausea or Vomiting. - Oral    Cosign for Ordering: Accepted by Nicole Valdes MD on 12/18/2018  9:27 AM    Linked Group 1:  \"Or\" Linked Group Details        ondansetron ODT (ZOFRAN-ODT) disintegrating tablet 4 mg 4 mg Every 6 Hours PRN 12/17/2018     Sig - Route: Take 1 tablet by mouth Every 6 (Six) Hours As Needed for Nausea or Vomiting. - Oral    Cosign for Ordering: Accepted by Nicole Valdes MD on 12/18/2018  9:27 AM    Linked Group 1:  \"Or\" Linked Group Details        rivaroxaban (XARELTO) tablet 20 mg 20 mg Daily With Dinner 12/17/2018     Sig - Route: Take 1 tablet by mouth Daily With Dinner. - Oral    sodium chloride 0.9 % flush 10 mL 10 mL As Needed 12/17/2018     Sig - Route: Infuse 10 mL into a venous catheter As Needed for Line Care. - Intravenous    Linked Group 2:  \"And\" Linked Group Details        sodium chloride 0.9 % flush 3 mL 3 mL Every 12 Hours Scheduled 12/17/2018     Sig - Route: Infuse 3 mL into a venous catheter Every 12 (Twelve) Hours. - Intravenous    Cosign for Ordering: Accepted by Nicole Valdes MD on 12/18/2018  9:27 AM    sodium chloride 0.9 % flush 3-10 mL 3-10 mL As Needed 12/17/2018     Sig - Route: Infuse 3-10 mL into a venous catheter As Needed for Line Care. - Intravenous    Cosign for Ordering: Accepted by Nicole Valdes MD on 12/18/2018  9:27 AM            Lab Results (last 24 hours)     Procedure Component Value Units Date/Time    Beta Strep Culture, Throat - Swab, Throat [[id number]]  (Normal) Collected:  12/17/18 1746    Specimen:  Swab from Throat Updated:  12/19/18 1229     Throat Culture, Beta Strep No Group A Streptococcus Isolated    Urine Culture - Urine, Urine, Clean Catch [[id number]] Collected:  12/17/18 1946    Specimen:  Urine, Clean Catch Updated:  12/19/18 1026     Urine Culture >100,000 CFU/mL Normal Urogenital Kimberly    Blood Culture - " Blood, Arm, Left [[id number]] Collected:  12/17/18 2002    Specimen:  Blood from Arm, Left Updated:  12/18/18 2015     Blood Culture No growth at 24 hours    Blood Culture - Blood, Arm, Left [[id number]] Collected:  12/17/18 1951    Specimen:  Blood from Arm, Left Updated:  12/18/18 2015     Blood Culture No growth at 24 hours        Imaging Results (last 24 hours)     ** No results found for the last 24 hours. **        ECG/EMG Results (last 24 hours)     ** No results found for the last 24 hours. **        Physician Progress Notes (last 24 hours) (Notes from 12/18/2018  2:39 PM through 12/19/2018  2:39 PM)     No notes of this type exist for this encounter.

## 2018-12-19 NOTE — DISCHARGE PLACEMENT REQUEST
"Mary Lou Graves (83 y.o. Female)     Date of Birth Social Security Number Address Home Phone MRN    1935  [address]  [place] [id number] [phone] [id number]    Shinto Marital Status          None        Admission Date Admission Type Admitting Provider Attending Provider Department, Room/Bed    18 Emergency Nicole Valdes MD Kfoury, Wajdi Samir, MD [facility] OBSERVATION UNIT, 215/1S    Discharge Date Discharge Disposition Discharge Destination         Home-Health Care [facility]              Attending Provider:  Nicole Valdes MD    Allergies:  No Known Allergies    Isolation:  None   Infection:  None   Code Status:  CPR    Ht:  177.8 cm (70\")   Wt:  44 kg (97 lb)    Admission Cmt:  None   Principal Problem:  None                Active Insurance as of 2018     Primary Coverage     Payor Plan Insurance Group Employer/Plan Group    MEDICARE MEDICARE A & B      Payor Plan Address Payor Plan Phone Number Payor Plan Fax Number Effective Dates    [address] [phone]  1989 - None Entered    [place] [address]       Subscriber Name Subscriber Birth Date Member ID       MARY LOU GRAVES 1935 [id number]                 Emergency Contacts      (Rel.) Home Phone Work Phone Mobile Phone    LEONIDES GRAVES (Son) -- -- [phone]    RACHAEL GRAVES (Spouse) -- -- [phone]        [facility] OBSERVATION UNIT  [address] [place] [address]  Phone:  [phone]  Fax:   Date: Dec 19, 2018      Ambulatory Referral to Home Health     Patient:  Mary Lou Graves MRN:  [id number]   [address]  [facility] [address] :  1935  SSN:    Phone: [phone] Sex:  F      INSURANCE PAYOR PLAN GROUP # SUBSCRIBER ID   Primary:    MEDICARE [id number]   [id number]      Referring Provider Information:  MARK PAGE Phone: [phone] Fax:       Referral Information:   # Visits:  1 Referral Type: Home Health [42]   Urgency: "  Routine Referral Reason: Specialty Services Required   Start Date: Dec 19, 2018 End Date:  To be determined by Insurer   Diagnosis: Disorientation (R41.0 [ICD-10-CM] 780.99 [ICD-9-CM])      Refer to Dept:   Refer to Provider:   Refer to Facility:       Face to Face Visit Date: 12/19/2018  Follow-up Provider for Plan of Care? I treated the patient in an acute care facility and will not continue treatment after discharge.  Follow-up Provider: ANA COLEMAN [[id number]]  Reason/Clinical Findings: debility  Describe mobility limitations that make leaving home difficult: weakness, fatigue  Nursing/Therapeutic Services Requested: Skilled Nursing  Nursing/Therapeutic Services Requested: Physical Therapy  Nursing/Therapeutic Services Requested: Medical / Social Work  Nursing/Therapeutic Services Requested: Dietician  Skilled nursing orders: Mental health  Frequency: 1 Week 1     This document serves as a request of services and does not constitute Insurance authorization or approval of services.  To determine eligibility, please contact the members Insurance carrier to verify and review coverage.     If you have medical questions regarding this request for services. Please contact [facility] OBSERVATION UNIT at [phone] between the hours of 8:00am - 5:00pm (Mon-Fri).       Authorizing Provider:Fernanda Al PA-C  Authorizing Provider's NPI: [id number]  Order Entered By: Fernanda Al PA-C 12/19/2018  1:11 PM     Electronically signed by: Fernanda Al PA-C 12/19/2018  1:11 PM            Emergency Contact Information     Name Relation Home Work Mobile    LEONIDES GRAVES Son   [phone]    RACHAEL GRAVES Spouse   [phone]          Insurance Information                MEDICARE/MEDICARE A & B Phone: [phone]    Subscriber: Mary Lou Graves Subscriber#: [id number]    Group#:  Precert#:           Treatment Team     Provider Relationship Specialty Contact    Nicole Valdes MD  Attending, Consulting Physician, Physician of Record Infectious Diseases  [phone]    Vlad Luz [place], RRT Respiratory Therapist --  [phone]    Salome Taylor, RN Registered Nurse --      Carlie Gaines Certified Nursing Assistant --      Kate Hui, RN Case Manager --      Tony Villatoro, RN Registered Nurse --      Carey Esteves CNA Unit [place] --            Problem List           Codes Noted - Resolved       Hospital    Acute UTI ICD-10-CM: N39.0  ICD-9-CM: 599.0 2018 - Present             History & Physical      Fernanda Al PA-C at 2018  9:12 AM                   HISTORY AND PHYSICAL        Patient Identification:  Name:  Mary Lou Graves  Age:  83 y.o.  Sex:  female  :  1935  MRN:  [id number]   Visit Number:  [id number]  Primary Care Physician:  Provider, No Known       Subjective     Subjective     Chief complaint:     Chief Complaint   Patient presents with   • Altered Mental Status       History of presenting illness:     The patient is an 83 year old female with history of history of CABG, pacemaker, hypertension, dyslipidemia, Alzheimer's dementia, takes Xarelto for a fib. She was brought to the ED by family for increased confusion. Found to have a UTI and initiated on Rocephin. Admitted to the observation unit for further evaluation and UTI treatment. VSS. Normal white count. Went into A fib overnight and converted back to sinus rhythm. On anticoagulation with Xarelto. Comfortable this morning with no complaints. Unfortunately unable to provide HPI or ROS.       ---------------------------------------------------------------------------------------------------------------------     Review Of Systems:    Unable to obtain as the patient is confused.  ---------------------------------------------------------------------------------------------------------------------     Past Medical History    Past Medical History:   Diagnosis Date   •  Hyperlipidemia    • Hypertension        Past Surgical History    Past Surgical History:   Procedure Laterality Date   • HYSTERECTOMY         Family History    History reviewed. No pertinent family history.    Social History    Social History     Tobacco Use   • Smoking status: Current Every Day Smoker     Packs/day: 0.50   Substance Use Topics   • Alcohol use: No     Frequency: Never   • Drug use: No       Allergies    Patient has no known allergies.  ---------------------------------------------------------------------------------------------------------------------     Home Medications:    Prior to Admission Medications     Prescriptions Last Dose Informant Patient Reported? Taking?    atenolol (TENORMIN) 25 MG tablet 12/16/2018 Medication Bottle Yes Yes    Take 25 mg by mouth Daily.    atorvastatin (LIPITOR) 5 MG half tablet 12/16/2018 Medication Bottle Yes Yes    Take 5 mg by mouth Daily.    rivaroxaban (XARELTO) 20 MG tablet 12/16/2018 Medication Bottle Yes Yes    Take 20 mg by mouth Daily With Dinner.        ---------------------------------------------------------------------------------------------------------------------    Objective     Objective     Hospital Scheduled Meds:    atenolol 25 mg Oral Daily   atorvastatin 5 mg Oral Daily   rivaroxaban 20 mg Oral Daily With Dinner   sodium chloride 3 mL Intravenous Q12H        ---------------------------------------------------------------------------------------------------------------------   Vital Signs:  Temp:  [97.7 °F (36.5 °C)-98.1 °F (36.7 °C)] 97.8 °F (36.6 °C)  Heart Rate:  [61-97] 61  Resp:  [18] 18  BP: (116-167)/(50-84) 116/50  Mean Arterial Pressure (Non-Invasive) for the past 24 hrs (Last 3 readings):   Noninvasive MAP (mmHg)   12/18/18 0407 93   12/18/18 0000 96   12/17/18 2024 105     SpO2 Percentage    12/18/18 0407 12/18/18 0750 12/18/18 0759   SpO2: 95% 96% 90%     SpO2:  [84 %-100 %] 90 %  on   ;   Device (Oxygen Therapy): room air    Body  mass index is 13.92 kg/m².  Wt Readings from Last 3 Encounters:   12/17/18 44 kg (97 lb)     ---------------------------------------------------------------------------------------------------------------------     Physical Exam:    Constitutional:  Well-developed and well-nourished.  No respiratory distress.      HENT:  Head: Normocephalic and atraumatic.  Mouth:  Moist mucous membranes.    Eyes:  Conjunctivae and EOM are normal.  No scleral icterus.  Neck:  Neck supple.  No JVD present.    Cardiovascular:  Normal rate, regular rhythm and normal heart sounds with no murmur. No edema.  Pulmonary/Chest:  No respiratory distress, no wheezes, no crackles, with normal breath sounds and good air movement.  Abdominal:  Soft.  Bowel sounds are normal.  No distension and no tenderness.   Musculoskeletal:  No edema, no tenderness, and no deformity.  No swelling or redness of joints.  Neurological:  Alert and oriented to person, place, and time.  No facial droop.  No slurred speech.   Skin:  Skin is warm and dry.  No rash noted.  No pallor.   Psychiatric:  Normal mood and affect.  Behavior is normal.    ---------------------------------------------------------------------------------------------------------------------  I have personally reviewed the EKG/Telemetry strip  ---------------------------------------------------------------------------------------------------------------------   Results from last 7 days   Lab Units  12/17/18   1629   TROPONIN I ng/mL  0.028       Results from last 7 days   Lab Units  12/18/18   0050   CHOLESTEROL mg/dL  128   TRIGLYCERIDES mg/dL  78   HDL CHOL mg/dL  46*   LDL CHOL mg/dL  66     Results from last 7 days   Lab Units  12/17/18   1619   PH, ARTERIAL pH units  7.462*   PO2 ART mm Hg  80.4   PCO2, ARTERIAL mm Hg  35.6   HCO3 ART mmol/L  24.9     Results from last 7 days   Lab Units  12/18/18   0050  12/17/18   1629   WBC 10*3/mm3  6.86  7.93   HEMOGLOBIN g/dL  11.9*  11.7*   HEMATOCRIT %   36.7*  36.5*   MCV fL  92.2  93.6   MCHC g/dL  32.4*  32.1*   PLATELETS 10*3/mm3  155  152     Results from last 7 days   Lab Units  12/18/18   0050  12/17/18   1629   SODIUM mmol/L  136  138   POTASSIUM mmol/L  3.7  4.0   CHLORIDE mmol/L  103  104   CO2 mmol/L  27.1  27.9   BUN mg/dL  15  21   CREATININE mg/dL  0.75  0.98   EGFR IF NONAFRICN AM mL/min/1.73  74  54*   CALCIUM mg/dL  8.9  9.2   GLUCOSE mg/dL  89  106   ALBUMIN g/dL  3.90  4.20   BILIRUBIN mg/dL  1.0  1.3   ALK PHOS U/L  60  64   AST (SGOT) U/L  23  26   ALT (SGPT) U/L  11  17   Estimated Creatinine Clearance: 38.3 mL/min (by C-G formula based on SCr of 0.75 mg/dL).  No results found for: AMMONIA    No results found for: HGBA1C, POCGLU  No results found for: HGBA1C  Lab Results   Component Value Date    TSH 1.732 12/18/2018       Blood Culture   Date Value Ref Range Status   12/17/2018 No growth at less than 24 hours  Preliminary   12/17/2018 No growth at less than 24 hours  Preliminary     Urine Culture   Date Value Ref Range Status   12/17/2018 Normal Urogenital Kimberly  Preliminary              Pain Management Panel     There is no flowsheet data to display.        I have personally reviewed the above laboratory results.   ---------------------------------------------------------------------------------------------------------------------  Imaging Results (last 7 days)     Procedure Component Value Units Date/Time    CT Head Without Contrast [[id number]] Updated:  12/17/18 6659    XR Chest 2 View [[id number]] Updated:  12/17/18 1986        I have personally reviewed the above radiology results.   ---------------------------------------------------------------------------------------------------------------------      Assessment & Plan        Assessment/Plan       ASSESSMENT:    1. Altered mental status   2. UTI    PLAN:    Admitted to the observation unit for further evaluation and UTI treatment. VSS. Normal white count. Went into A fib overnight and  converted back to sinus rhythm. On anticoagulation with Xarelto.     Initiated Omnicef 300 mg PO BID x 5 days for UTI coverage.    Continue telemetry monitoring closely for a fibb.     Carotid ultrasound bilaterally ordered.     Patient's findings and recommendations were discussed with patient and nursing staff      Code Status:   Code Status and Medical Interventions:   Ordered at: 12/17/18 1946     Code Status:    CPR     Medical Interventions (Level of Support Prior to Arrest):    Full           Fernanda Al PA-C  12/18/18  9:12 AM      Electronically signed by Nicole Valdes MD at 12/19/2018  6:25 AM       ICU Vital Signs     Row Name 12/19/18 1149 12/19/18 0748 12/19/18 0720 12/19/18 0025 12/18/18 1945       Vitals    Temp  97.7 °F (36.5 °C)  97.7 °F (36.5 °C)  --  --  --    Temp src  Axillary  Oral  --  --  --    Pulse  69  64  --  69  --    Heart Rate Source  Monitor  Monitor  --  Monitor  --    Resp  18  18  --  18  --    Resp Rate Source  Visual  Visual  --  Visual  --    BP  135/62  108/60  --  161/78  --    Noninvasive MAP (mmHg)  77  72  --  94  --    BP Location  Left arm  Left arm  --  Left arm  --    BP Method  Automatic  Automatic  --  Automatic  --    Patient Position  Lying  Lying  --  Lying  --       Oxygen Therapy    SpO2  96 %  95 %  --  95 %  --    Pulse Oximetry Type  Intermittent  Continuous  --  --  --    Device (Oxygen Therapy)  room air  room air  room air  --  room air    Row Name 12/18/18 1900 12/18/18 1600                Vitals    Temp  97.8 °F (36.6 °C)  97.4 °F (36.3 °C)       Temp src  Oral  Oral       Pulse  61  62       Heart Rate Source  Monitor  Monitor       Resp  18  18       Resp Rate Source  Visual  Visual       BP  163/63  129/65       Noninvasive MAP (mmHg)  87  86       BP Location  Left arm  Left arm       BP Method  Automatic  Automatic       Patient Position  Sitting  Lying          Oxygen Therapy    SpO2  94 %  97 %           Intake & Output (last day)        12/18 0701 - 12/19 0700 12/19 0701 - 12/20 0700    P.O. 240     Total Intake(mL/kg) 240 (5.5)     Net +240           Urine Unmeasured Occurrence 1 x         Hospital Medications (active)       Dose Frequency Start End    acetaminophen (TYLENOL) tablet 650 mg 650 mg Every 4 Hours PRN 12/17/2018     Sig - Route: Take 2 tablets by mouth Every 4 (Four) Hours As Needed for Mild Pain . - Oral    Cosign for Ordering: Accepted by Nicole Valdes MD on 12/18/2018  9:27 AM    atenolol (TENORMIN) tablet 25 mg 25 mg Daily 12/18/2018     Sig - Route: Take 1 tablet by mouth Daily. - Oral    atorvastatin (LIPITOR) tablet 5 mg 5 mg Daily 12/18/2018     Sig - Route: Take 0.5 tablets by mouth Daily. - Oral    cefdinir (OMNICEF) capsule 300 mg 300 mg Every 12 Hours Scheduled 12/18/2018 12/23/2018    Sig - Route: Take 1 capsule by mouth Every 12 (Twelve) Hours. - Oral    haloperidol (HALDOL) tablet 2 mg 2 mg 2 Times Daily PRN 12/18/2018     Sig - Route: Take 1 tablet by mouth 2 (Two) Times a Day As Needed for Agitation. - Oral    LORazepam (ATIVAN) injection 0.5 mg 0.5 mg Once 12/18/2018 12/18/2018    Sig - Route: Infuse 0.25 mL into a venous catheter 1 (One) Time. - Intravenous    LORazepam (ATIVAN) injection 1 mg 1 mg Once 12/19/2018 12/19/2018    Sig - Route: Infuse 0.5 mL into a venous catheter 1 (One) Time. - Intravenous    nitroglycerin (NITROSTAT) SL tablet 0.4 mg 0.4 mg Every 5 Minutes PRN 12/17/2018     Sig - Route: Place 1 tablet under the tongue Every 5 (Five) Minutes As Needed for Chest Pain (Chest Pain With Systolic Blood Pressure Greater Than 100). - Sublingual    Cosign for Ordering: Accepted by Nicole Valdes MD on 12/18/2018  9:27 AM    ondansetron (ZOFRAN) injection 4 mg 4 mg Every 6 Hours PRN 12/17/2018     Sig - Route: Infuse 2 mL into a venous catheter Every 6 (Six) Hours As Needed for Nausea or Vomiting. - Intravenous    Cosign for Ordering: Accepted by Nicole Valdes MD on 12/18/2018  9:27 AM  "   Linked Group 1:  \"Or\" Linked Group Details        ondansetron (ZOFRAN) tablet 4 mg 4 mg Every 6 Hours PRN 12/17/2018     Sig - Route: Take 1 tablet by mouth Every 6 (Six) Hours As Needed for Nausea or Vomiting. - Oral    Cosign for Ordering: Accepted by Nicole Valdes MD on 12/18/2018  9:27 AM    Linked Group 1:  \"Or\" Linked Group Details        ondansetron ODT (ZOFRAN-ODT) disintegrating tablet 4 mg 4 mg Every 6 Hours PRN 12/17/2018     Sig - Route: Take 1 tablet by mouth Every 6 (Six) Hours As Needed for Nausea or Vomiting. - Oral    Cosign for Ordering: Accepted by Nicole Valdes MD on 12/18/2018  9:27 AM    Linked Group 1:  \"Or\" Linked Group Details        rivaroxaban (XARELTO) tablet 20 mg 20 mg Daily With Dinner 12/17/2018     Sig - Route: Take 1 tablet by mouth Daily With Dinner. - Oral    sodium chloride 0.9 % flush 10 mL 10 mL As Needed 12/17/2018     Sig - Route: Infuse 10 mL into a venous catheter As Needed for Line Care. - Intravenous    Linked Group 2:  \"And\" Linked Group Details        sodium chloride 0.9 % flush 3 mL 3 mL Every 12 Hours Scheduled 12/17/2018     Sig - Route: Infuse 3 mL into a venous catheter Every 12 (Twelve) Hours. - Intravenous    Cosign for Ordering: Accepted by Nicole Valdes MD on 12/18/2018  9:27 AM    sodium chloride 0.9 % flush 3-10 mL 3-10 mL As Needed 12/17/2018     Sig - Route: Infuse 3-10 mL into a venous catheter As Needed for Line Care. - Intravenous    Cosign for Ordering: Accepted by Nicole Valdes MD on 12/18/2018  9:27 AM            Lab Results (last 24 hours)     Procedure Component Value Units Date/Time    Beta Strep Culture, Throat - Swab, Throat [[id number]]  (Normal) Collected:  12/17/18 1746    Specimen:  Swab from Throat Updated:  12/19/18 1229     Throat Culture, Beta Strep No Group A Streptococcus Isolated    Urine Culture - Urine, Urine, Clean Catch [[id number]] Collected:  12/17/18 1946    Specimen:  Urine, Clean Catch Updated:  " 12/19/18 1026     Urine Culture >100,000 CFU/mL Normal Urogenital Kimberly    Blood Culture - Blood, Arm, Left [[id number]] Collected:  12/17/18 2002    Specimen:  Blood from Arm, Left Updated:  12/18/18 2015     Blood Culture No growth at 24 hours    Blood Culture - Blood, Arm, Left [[id number]] Collected:  12/17/18 1951    Specimen:  Blood from Arm, Left Updated:  12/18/18 2015     Blood Culture No growth at 24 hours        Imaging Results (last 24 hours)     Procedure Component Value Units Date/Time    US Carotid Bilateral [[id number]] Collected:  12/18/18 1346     Updated:  12/18/18 1416    Narrative:       US CAROTID BILATERAL-     HISTORY:  Confusion; N39.0-Urinary tract infection, site not specified;  R41.0-Disorientation, unspecified     TECHNIQUE:  Multiple real-time color doppler images were obtained.  M-mode Doppler was used for velocity determination and spectral pattern.  Stenosis was evaluated using the NASCET or similar measurement  technique.     RIGHT SIDE: The common carotid artery was normal in caliber. Peak  systolic and diastolic velocities were 96 and 5 cm/s. At the carotid  bifurcation, there was some calcific plaque that did not significantly  narrow the lumen of the vessel. Peak systolic and diastolic velocities  in the ICA were 71 and 10 cm/s. Antegrade flow was demonstrated in the  right vertebral artery.     LEFT SIDE: The common carotid artery was normal in caliber. Peak  systolic and diastolic velocities were 87 and 8 cm/s. At the carotid  bifurcation, there was some calcific plaque and tortuosity in the  internal carotid artery. The velocity, however, was not elevated peaking  systolically at 83 and diastolically with 12 cm/s. Antegrade flow is  also noted in the vertebral artery.       Impression:       Mild atherosclerotic plaques are noted involving both  proximal internal carotid arteries.  By velocity measurements, they are  not felt to be hemodynamically significant. Both vertebral  arteries show  antegrade flow.     This report was finalized on 2018 2:14 PM by Dr. Varinder Silva II, MD.           ECG/EMG Results (last 24 hours)     ** No results found for the last 24 hours. **        Physician Progress Notes (last 24 hours) (Notes from 2018  2:05 PM through 2018  2:05 PM)     No notes of this type exist for this encounter.           Discharge Summary      Fernanda Al PA-C at 2018  1:12 PM                  DISCHARGE SUMMARY        Patient Identification:  Name:  Mary Lou Graves  Age:  83 y.o.  Sex:  female  :  1935  MRN:  [id number]  Visit Number:  [id number]    Date of Admission: 2018  Date of Discharge:  2018    PCP: Provider, No Known    Discharging Provider: Fernanda Al PA-C      Discharge Diagnoses     UTI      Consults/Procedures     Consults:   Consults     Date and Time Order Name Status Description    2018 IP General Consult (Use specialty-specific consult if known)            Procedures Performed:         History of Presenting Illness     Patient is a 83 y.o. female presented to [facility] complaining of increased confusion.  Please see the admitting history and physical for further details.      Hospital Course     Patient was admitted to the observation unit for further evaluation and UTI treatment. The patient continues to have confusion and required antipsychotics during hospitalization but after discussion with  and son this seems to be baseline for Ms. Graves. CT of the head showed atrophy and chronic small vessel ischemic changes but negative for any acute intracranial abnormalities. VSS. Normal white count. Went into A fib and converted back to sinus rhythm. On anticoagulation with Xarelto. Chest xray normal. Carotid ultrasound Mild atherosclerotic plaques are noted involving both proximal internal carotid arteries.  By velocity measurements, they are not felt to be hemodynamically  significant. Both vertebral arteries show  antegrade flow.    She was found to have a UTI and was initiated on Omnicef 300 mg PO BID to continue for 7 days.    Initially the patients  and son wish to take the patient home on discharge with home health but today they have expressed concern about the patient going home with her  as he is elderly as well and did not want her sent to live at her other home in [place] with her other son who does not seem to be appropriate to take care of the patient. Amee, with case management, and I spoke with the patients son and  at length about Mrs. Graves's plan of care. The patients son is agreeable to take her home with him and his significant other with home health until nursing home placement can be obtained. Amee will continue to work on placement after discharge. Patient to be discharged today to follow up with PCP within one week.       Discharge Vitals/Physical Examination     Vital Signs:  Temp:  [97.4 °F (36.3 °C)-97.8 °F (36.6 °C)] 97.7 °F (36.5 °C)  Heart Rate:  [61-69] 69  Resp:  [18] 18  BP: (108-163)/(60-78) 135/62  Mean Arterial Pressure (Non-Invasive) for the past 24 hrs (Last 3 readings):   Noninvasive MAP (mmHg)   12/19/18 1149 77   12/19/18 0748 72   12/19/18 0025 94     SpO2 Percentage    12/19/18 0025 12/19/18 0748 12/19/18 1149   SpO2: 95% 95% 96%     SpO2:  [94 %-97 %] 96 %  on   ;   Device (Oxygen Therapy): room air    Body mass index is 13.92 kg/m².  Wt Readings from Last 3 Encounters:   12/17/18 44 kg (97 lb)         Physical Exam:    Constitutional:  Frail. Confused. No respiratory distress.      HENT:  Head: Normocephalic and atraumatic.  Mouth:  Moist mucous membranes.    Eyes:  Conjunctivae and EOM are normal.  No scleral icterus.  Neck:  Neck supple.  No JVD present.    Cardiovascular:  Normal rate, regular rhythm and normal heart sounds with no murmur. No edema.  Pulmonary/Chest:  No respiratory distress, no wheezes, no  crackles, with normal breath sounds and good air movement.  Abdominal:  Soft.  Bowel sounds are normal.  No distension and no tenderness.   Musculoskeletal:  No edema, no tenderness, and no deformity.  No swelling or redness of joints.  Neurological: Confused  Skin:  Skin is warm and dry.  No rash noted.  No pallor.   Psychiatric:  Normal mood and affect.  Behavior is normal.      Pertinent Laboratory/Radiology Results     Pertinent Laboratory Results:    Results from last 7 days   Lab Units  12/17/18   1629   TROPONIN I ng/mL  0.028       Results from last 7 days   Lab Units  12/18/18   0050   CHOLESTEROL mg/dL  128   TRIGLYCERIDES mg/dL  78   HDL CHOL mg/dL  46*   LDL CHOL mg/dL  66     Results from last 7 days   Lab Units  12/17/18   1619   PH, ARTERIAL pH units  7.462*   PO2 ART mm Hg  80.4   PCO2, ARTERIAL mm Hg  35.6   HCO3 ART mmol/L  24.9     Results from last 7 days   Lab Units  12/18/18   0050  12/17/18   1629   WBC 10*3/mm3  6.86  7.93   HEMOGLOBIN g/dL  11.9*  11.7*   HEMATOCRIT %  36.7*  36.5*   MCV fL  92.2  93.6   MCHC g/dL  32.4*  32.1*   PLATELETS 10*3/mm3  155  152     Results from last 7 days   Lab Units  12/18/18   0050  12/17/18   1629   SODIUM mmol/L  136  138   POTASSIUM mmol/L  3.7  4.0   CHLORIDE mmol/L  103  104   CO2 mmol/L  27.1  27.9   BUN mg/dL  15  21   CREATININE mg/dL  0.75  0.98   EGFR IF NONAFRICN AM mL/min/1.73  74  54*   CALCIUM mg/dL  8.9  9.2   GLUCOSE mg/dL  89  106   ALBUMIN g/dL  3.90  4.20   BILIRUBIN mg/dL  1.0  1.3   ALK PHOS U/L  60  64   AST (SGOT) U/L  23  26   ALT (SGPT) U/L  11  17   Estimated Creatinine Clearance: 38.3 mL/min (by C-G formula based on SCr of 0.75 mg/dL).  No results found for: AMMONIA    No results found for: HGBA1C, POCGLU  No results found for: HGBA1C  Lab Results   Component Value Date    TSH 1.732 12/18/2018       Blood Culture   Date Value Ref Range Status   12/17/2018 No growth at 24 hours  Preliminary   12/17/2018 No growth at 24 hours   Preliminary     Urine Culture   Date Value Ref Range Status   12/17/2018 >100,000 CFU/mL Normal Urogenital Kimberly  Final              Pain Management Panel     There is no flowsheet data to display.          Pertinent Radiology Results:  Imaging Results (all)     Procedure Component Value Units Date/Time    US Carotid Bilateral [[id number]] Collected:  12/18/18 1346     Updated:  12/18/18 1416    Narrative:       US CAROTID BILATERAL-     HISTORY:  Confusion; N39.0-Urinary tract infection, site not specified;  R41.0-Disorientation, unspecified     TECHNIQUE:  Multiple real-time color doppler images were obtained.  M-mode Doppler was used for velocity determination and spectral pattern.  Stenosis was evaluated using the NASCET or similar measurement  technique.     RIGHT SIDE: The common carotid artery was normal in caliber. Peak  systolic and diastolic velocities were 96 and 5 cm/s. At the carotid  bifurcation, there was some calcific plaque that did not significantly  narrow the lumen of the vessel. Peak systolic and diastolic velocities  in the ICA were 71 and 10 cm/s. Antegrade flow was demonstrated in the  right vertebral artery.     LEFT SIDE: The common carotid artery was normal in caliber. Peak  systolic and diastolic velocities were 87 and 8 cm/s. At the carotid  bifurcation, there was some calcific plaque and tortuosity in the  internal carotid artery. The velocity, however, was not elevated peaking  systolically at 83 and diastolically with 12 cm/s. Antegrade flow is  also noted in the vertebral artery.       Impression:       Mild atherosclerotic plaques are noted involving both  proximal internal carotid arteries.  By velocity measurements, they are  not felt to be hemodynamically significant. Both vertebral arteries show  antegrade flow.     This report was finalized on 12/18/2018 2:14 PM by Dr. Varinder Silva II, MD.       XR Chest 2 View [[id number]] Collected:  12/18/18 0920     Updated:  12/18/18 0923     Narrative:       EXAMINATION: XR CHEST 2 VW-      CLINICAL INDICATION:     AMS     TECHNIQUE:  XR CHEST 2 VW-      COMPARISON: NONE      FINDINGS:   Coarse interstitial markings suggestive of chronic interstitial lung  disease.  Heart and mediastinum contours are unremarkable.  No pleural effusion.  No pneumothorax.   Bony and soft tissue structures are unremarkable.       Impression:       No radiographic evidence of acute cardiac or pulmonary  disease.     This report was finalized on 12/18/2018 9:21 AM by Dr. Kendall Lawton MD.       CT Head Without Contrast [[id number]] Collected:  12/18/18 0920     Updated:  12/18/18 0923    Narrative:          EXAMINATION: CT HEAD WO CONTRAST-      CLINICAL INDICATION:     AMS     TECHNIQUE: Contiguous axial CT images of the head were obtained without  contrast administration.      Radiation dose reduction techniques were utilized per ALARA protocol.  Automated exposure control was initiated through either or [employer] or  [employer] software packages by  protocol.       1095.9 mGy.cm     COMPARISON:  None.       FINDINGS: Generalized cerebral atrophy is present. There is no mass  effect, midline displacement, or hydrocephalus. There are patchy areas  of decreased density within the periventricular white matter which  likely reflect chronic small vessel ischemic changes. There is no CT  evidence of acute infarct or hemorrhage. Bone windows reveal no osseous  abnormalities or fractures.        Impression:       Atrophy and chronic small vessel ischemic change, but there  are no acute intracranial abnormalities identified.         This report was finalized on 12/18/2018 9:20 AM by Dr. Kendall Lawton MD.             Test Results Pending at Discharge:   Order Current Status    Blood Culture - Blood, Arm, Left Preliminary result    Blood Culture - Blood, Arm, Left Preliminary result          Discharge Disposition/Discharge Medications/Discharge Appointments     Discharge  Disposition:   Home-Health Care [facility] [place] [facility]    Condition at Discharge:  Stable, much improved with no issues today     Code Status While Inpatient:  Code Status and Medical Interventions:   Ordered at: 12/17/18 1946     Code Status:    CPR     Medical Interventions (Level of Support Prior to Arrest):    Full       Discharge Medications:     Discharge Medications      New Medications      Instructions Start Date   cefdinir 300 MG capsule  Commonly known as:  OMNICEF   300 mg, Oral, Every 12 Hours Scheduled         Continue These Medications      Instructions Start Date   atenolol 25 MG tablet  Commonly known as:  TENORMIN   25 mg, Oral, Daily      atorvastatin 5 MG half tablet  Commonly known as:  LIPITOR   5 mg, Oral, Daily      rivaroxaban 20 MG tablet  Commonly known as:  XARELTO   20 mg, Oral, Daily With Dinner             Discharge Diet:  Diet Instructions     Diet: Regular      Discharge Diet:  Regular          Discharge Activity:  Activity Instructions     Activity as Tolerated            Discharge Appointments:      Additional Instructions for the Follow-ups that You Need to Schedule     Ambulatory Referral to Home Health   As directed      Face to Face Visit Date:  12/19/2018    Follow-up Provider for Plan of Care?:  I treated the patient in an acute care facility and will not continue treatment after discharge.    Follow-up Provider:  ANA COLEMAN [[id number]]    Reason/Clinical Findings:  debility    Describe mobility limitations that make leaving home difficult:  weakness, fatigue    Nursing/Therapeutic Services Requested:  Skilled Nursing Physical Therapy Medical / Social Work Dietician    Skilled nursing orders:  Mental health    Frequency:  1 Week 1           Follow-up Information     Provider, No Known Follow up.    [facility] [address]                   Additional Instructions for the Follow-ups that You Need to Schedule     Ambulatory Referral to Home Health   As  directed      Face to Face Visit Date:  12/19/2018    Follow-up Provider for Plan of Care?:  I treated the patient in an acute care facility and will not continue treatment after discharge.    Follow-up Provider:  ANA COLEMAN [[id number]]    Reason/Clinical Findings:  debility    Describe mobility limitations that make leaving home difficult:  weakness, fatigue    Nursing/Therapeutic Services Requested:  Skilled Nursing Physical Therapy Medical / Social Work Dietician    Skilled nursing orders:  Mental health    Frequency:  1 Week 1                   Fernanda Al PA-C  12/19/18  1:20 PM          Please note that this discharge summary required more than 30 minutes to complete.      Electronically signed by Fernanda Al PA-C at 12/19/2018  1:32 PM       Discharge Order (From admission, onward)    Start     Ordered    12/19/18 1301  Discharge patient  Once     Expected Discharge Date:  12/19/18    Expected Discharge Time:  Afternoon    Discharge Disposition:  Home-Health Care [facility]    Physician of Record for Attribution - Please select from Treatment Team:  VIKTOR MONTAGUE [[id number]]    Review needed by CMO to determine Physician of Record:  No       Question Answer Comment   Physician of Record for Attribution - Please select from Treatment Team VIKTOR MONTAGUE    Review needed by CMO to determine Physician of Record No        12/19/18 9906
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
## 2018-12-19 NOTE — PLAN OF CARE
Problem: Fall Risk (Adult)  Goal: Identify Related Risk Factors and Signs and Symptoms   12/18/18 0217   Fall Risk (Adult)   Related Risk Factors (Fall Risk) age-related changes;gait/mobility problems;environment unfamiliar;inadequate lighting;confusion/agitation     Goal: Absence of Fall   12/18/18 1027   Fall Risk (Adult)   Absence of Fall making progress toward outcome       Problem: Urinary Tract Infection (Adult)  Goal: Signs and Symptoms of Listed Potential Problems Will be Absent, Minimized or Managed (Urinary Tract Infection)   12/18/18 1027   Goal/Outcome Evaluation   Problems Assessed (Urinary Tract Infection) all   Problems Present (UTI) none       Problem: Confusion, Acute (Adult)  Goal: Identify Related Risk Factors and Signs and Symptoms   12/18/18 1027   Confusion, Acute (Adult)   Related Risk Factors (Acute Confusion) advanced age   Signs and Symptoms (Acute Confusion) memory disturbed;disorientation     Goal: Cognitive/Functional Impairments Minimized   12/18/18 1027   Confusion, Acute (Adult)   Cognitive/Functional Impairments Minimized making progress toward outcome       Problem: Patient Care Overview  Goal: Plan of Care Review   12/18/18 1945   Coping/Psychosocial   Plan of Care Reviewed With patient       Problem: Skin Injury Risk (Adult)  Goal: Identify Related Risk Factors and Signs and Symptoms   12/18/18 1027   Skin Injury Risk (Adult)   Related Risk Factors (Skin Injury Risk) advanced age;infection     Goal: Skin Health and Integrity   12/18/18 1027   Skin Injury Risk (Adult)   Skin Health and Integrity making progress toward outcome

## 2018-12-19 NOTE — PROGRESS NOTES
Discharge Planning Assessment  YAN Kate     Patient Name: Mary Lou Graves  MRN: [id number]  Today's Date: 12/19/2018    Admit Date: 12/17/2018    Discharge Plan     Row Name 12/19/18 1441       Plan    Patient/Family in Agreement with Plan  yes    Final Discharge Disposition Code  06 - home with home health care    Final Note Pt is being discharged home on this date. Pt's family is interested in nursing home placement however, they are agreeable to take pt home with home health until nursing home placement can be obtained. Pt will be returning home with her son, Devendra on this date. Pt's son lives in [place]. SS contacted [facility] and made referral. SS faxed referral to [facility] and provided RN with the number to call report. Pt will have to be admitted to the nursing home Medicaid pending. Pt's son request [facility].SS contacted [facility] per Dacia on this date who states she does not have any available beds on this date, but request referral to be faxed in case one becomes available. SS faxed referral to [facility].  will continue to work on nursing home placement. No other needs identified.      Amee Restrepo

## 2018-12-19 NOTE — DISCHARGE SUMMARY
DISCHARGE SUMMARY        Patient Identification:  Name:  Mary Lou Graves  Age:  83 y.o.  Sex:  female  :  1935  MRN:  [id number]  Visit Number:  [id number]    Date of Admission: 2018  Date of Discharge:  2018    PCP: Provider, No Known    Discharging Provider: Fernanda Al PA-C      Discharge Diagnoses     UTI      Consults/Procedures     Consults:   Consults     Date and Time Order Name Status Description    2018 IP General Consult (Use specialty-specific consult if known)            Procedures Performed:         History of Presenting Illness     Patient is a 83 y.o. female presented to [facility] complaining of increased confusion.  Please see the admitting history and physical for further details.      Hospital Course     Patient was admitted to the observation unit for further evaluation and UTI treatment. The patient continues to have confusion and required antipsychotics during hospitalization but after discussion with  and son this seems to be baseline for Ms. Graves. CT of the head showed atrophy and chronic small vessel ischemic changes but negative for any acute intracranial abnormalities. VSS. Normal white count. Went into A fib and converted back to sinus rhythm. On anticoagulation with Xarelto. Chest xray normal. Carotid ultrasound Mild atherosclerotic plaques are noted involving both proximal internal carotid arteries.  By velocity measurements, they are not felt to be hemodynamically significant. Both vertebral arteries show  antegrade flow.    She was found to have a UTI and was initiated on Omnicef 300 mg PO BID to continue for 7 days.    Initially the patients  and son wish to take the patient home on discharge with home health but today they have expressed concern about the patient going home with her  as he is elderly as well and did not want her sent to live at her other home in [place] with her other son who does  not seem to be appropriate to take care of the patient. Amee, with case management, and I spoke with the patients son and  at length about Mrs. Graves's plan of care. The patients son is agreeable to take her home with him and his significant other with home health until nursing home placement can be obtained. Amee will continue to work on placement after discharge. Patient to be discharged today to follow up with PCP within one week.       Discharge Vitals/Physical Examination     Vital Signs:  Temp:  [97.4 °F (36.3 °C)-97.8 °F (36.6 °C)] 97.7 °F (36.5 °C)  Heart Rate:  [61-69] 69  Resp:  [18] 18  BP: (108-163)/(60-78) 135/62  Mean Arterial Pressure (Non-Invasive) for the past 24 hrs (Last 3 readings):   Noninvasive MAP (mmHg)   12/19/18 1149 77   12/19/18 0748 72   12/19/18 0025 94     SpO2 Percentage    12/19/18 0025 12/19/18 0748 12/19/18 1149   SpO2: 95% 95% 96%     SpO2:  [94 %-97 %] 96 %  on   ;   Device (Oxygen Therapy): room air    Body mass index is 13.92 kg/m².  Wt Readings from Last 3 Encounters:   12/17/18 44 kg (97 lb)         Physical Exam:    Constitutional:  Frail. Confused. No respiratory distress.      HENT:  Head: Normocephalic and atraumatic.  Mouth:  Moist mucous membranes.    Eyes:  Conjunctivae and EOM are normal.  No scleral icterus.  Neck:  Neck supple.  No JVD present.    Cardiovascular:  Normal rate, regular rhythm and normal heart sounds with no murmur. No edema.  Pulmonary/Chest:  No respiratory distress, no wheezes, no crackles, with normal breath sounds and good air movement.  Abdominal:  Soft.  Bowel sounds are normal.  No distension and no tenderness.   Musculoskeletal:  No edema, no tenderness, and no deformity.  No swelling or redness of joints.  Neurological: Confused  Skin:  Skin is warm and dry.  No rash noted.  No pallor.   Psychiatric:  Normal mood and affect.  Behavior is normal.      Pertinent Laboratory/Radiology Results     Pertinent Laboratory  Results:    Results from last 7 days   Lab Units  12/17/18   1629   TROPONIN I ng/mL  0.028       Results from last 7 days   Lab Units  12/18/18   0050   CHOLESTEROL mg/dL  128   TRIGLYCERIDES mg/dL  78   HDL CHOL mg/dL  46*   LDL CHOL mg/dL  66     Results from last 7 days   Lab Units  12/17/18   1619   PH, ARTERIAL pH units  7.462*   PO2 ART mm Hg  80.4   PCO2, ARTERIAL mm Hg  35.6   HCO3 ART mmol/L  24.9     Results from last 7 days   Lab Units  12/18/18   0050  12/17/18   1629   WBC 10*3/mm3  6.86  7.93   HEMOGLOBIN g/dL  11.9*  11.7*   HEMATOCRIT %  36.7*  36.5*   MCV fL  92.2  93.6   MCHC g/dL  32.4*  32.1*   PLATELETS 10*3/mm3  155  152     Results from last 7 days   Lab Units  12/18/18   0050  12/17/18   1629   SODIUM mmol/L  136  138   POTASSIUM mmol/L  3.7  4.0   CHLORIDE mmol/L  103  104   CO2 mmol/L  27.1  27.9   BUN mg/dL  15  21   CREATININE mg/dL  0.75  0.98   EGFR IF NONAFRICN AM mL/min/1.73  74  54*   CALCIUM mg/dL  8.9  9.2   GLUCOSE mg/dL  89  106   ALBUMIN g/dL  3.90  4.20   BILIRUBIN mg/dL  1.0  1.3   ALK PHOS U/L  60  64   AST (SGOT) U/L  23  26   ALT (SGPT) U/L  11  17   Estimated Creatinine Clearance: 38.3 mL/min (by C-G formula based on SCr of 0.75 mg/dL).  No results found for: AMMONIA    No results found for: HGBA1C, POCGLU  No results found for: HGBA1C  Lab Results   Component Value Date    TSH 1.732 12/18/2018       Blood Culture   Date Value Ref Range Status   12/17/2018 No growth at 24 hours  Preliminary   12/17/2018 No growth at 24 hours  Preliminary     Urine Culture   Date Value Ref Range Status   12/17/2018 >100,000 CFU/mL Normal Urogenital Kimberly  Final              Pain Management Panel     There is no flowsheet data to display.          Pertinent Radiology Results:  Imaging Results (all)     Procedure Component Value Units Date/Time    US Carotid Bilateral [[id number]] Collected:  12/18/18 1346     Updated:  12/18/18 1416    Narrative:       US CAROTID BILATERAL-     HISTORY:   Confusion; N39.0-Urinary tract infection, site not specified;  R41.0-Disorientation, unspecified     TECHNIQUE:  Multiple real-time color doppler images were obtained.  M-mode Doppler was used for velocity determination and spectral pattern.  Stenosis was evaluated using the NASCET or similar measurement  technique.     RIGHT SIDE: The common carotid artery was normal in caliber. Peak  systolic and diastolic velocities were 96 and 5 cm/s. At the carotid  bifurcation, there was some calcific plaque that did not significantly  narrow the lumen of the vessel. Peak systolic and diastolic velocities  in the ICA were 71 and 10 cm/s. Antegrade flow was demonstrated in the  right vertebral artery.     LEFT SIDE: The common carotid artery was normal in caliber. Peak  systolic and diastolic velocities were 87 and 8 cm/s. At the carotid  bifurcation, there was some calcific plaque and tortuosity in the  internal carotid artery. The velocity, however, was not elevated peaking  systolically at 83 and diastolically with 12 cm/s. Antegrade flow is  also noted in the vertebral artery.       Impression:       Mild atherosclerotic plaques are noted involving both  proximal internal carotid arteries.  By velocity measurements, they are  not felt to be hemodynamically significant. Both vertebral arteries show  antegrade flow.     This report was finalized on 12/18/2018 2:14 PM by Dr. Varinder Silva II, MD.       XR Chest 2 View [[id number]] Collected:  12/18/18 0920     Updated:  12/18/18 0923    Narrative:       EXAMINATION: XR CHEST 2 VW-      CLINICAL INDICATION:     AMS     TECHNIQUE:  XR CHEST 2 VW-      COMPARISON: NONE      FINDINGS:   Coarse interstitial markings suggestive of chronic interstitial lung  disease.  Heart and mediastinum contours are unremarkable.  No pleural effusion.  No pneumothorax.   Bony and soft tissue structures are unremarkable.       Impression:       No radiographic evidence of acute cardiac or  pulmonary  disease.     This report was finalized on 12/18/2018 9:21 AM by Dr. Knedall Lawton MD.       CT Head Without Contrast [[id number]] Collected:  12/18/18 0920     Updated:  12/18/18 0923    Narrative:          EXAMINATION: CT HEAD WO CONTRAST-      CLINICAL INDICATION:     AMS     TECHNIQUE: Contiguous axial CT images of the head were obtained without  contrast administration.      Radiation dose reduction techniques were utilized per ALARA protocol.  Automated exposure control was initiated through either or [employer] or  [employer] software packages by  protocol.       1095.9 mGy.cm     COMPARISON:  None.       FINDINGS: Generalized cerebral atrophy is present. There is no mass  effect, midline displacement, or hydrocephalus. There are patchy areas  of decreased density within the periventricular white matter which  likely reflect chronic small vessel ischemic changes. There is no CT  evidence of acute infarct or hemorrhage. Bone windows reveal no osseous  abnormalities or fractures.        Impression:       Atrophy and chronic small vessel ischemic change, but there  are no acute intracranial abnormalities identified.         This report was finalized on 12/18/2018 9:20 AM by Dr. Kendall Lawton MD.             Test Results Pending at Discharge:   Order Current Status    Blood Culture - Blood, Arm, Left Preliminary result    Blood Culture - Blood, Arm, Left Preliminary result          Discharge Disposition/Discharge Medications/Discharge Appointments     Dis[place] Dis[place]   [place] [facility]    Condition at Discharge:  Stable, much improved with no issues today     Code Status While Inpatient:  Code Status and Medical Interventions:   Ordered at: 12/17/18 1946     Code Status:    CPR     Medical Interventions (Level of Support Prior to Arrest):    Full       Discharge Medications:     Discharge Medications      New Medications      Instructions Start Date   cefdinir 300 MG capsule  Commonly  known as:  OMNICEF   300 mg, Oral, Every 12 Hours Scheduled         Continue These Medications      Instructions Start Date   atenolol 25 MG tablet  Commonly known as:  TENORMIN   25 mg, Oral, Daily      atorvastatin 5 MG half tablet  Commonly known as:  LIPITOR   5 mg, Oral, Daily      rivaroxaban 20 MG tablet  Commonly known as:  XARELTO   20 mg, Oral, Daily With Dinner             Discharge Diet:  Diet Instructions     Diet: Regular      Discharge Diet:  Regular          Discharge Activity:  Activity Instructions     Activity as Tolerated            Discharge Appointments:      Additional Instructions for the Follow-ups that You Need to Schedule     Ambulatory Referral to Home Health   As directed      Face to Face Visit Date:  12/19/2018    Follow-up Provider for Plan of Care?:  I treated the patient in an acute care facility and will not continue treatment after discharge.    Follow-up Provider:  ANA COLEMAN [[id number]]    Reason/Clinical Findings:  debility    Describe mobility limitations that make leaving home difficult:  weakness, fatigue    Nursing/Therapeutic Services Requested:  Skilled Nursing Physical Therapy Medical / Social Work Dietician    Skilled nursing orders:  Mental health    Frequency:  1 Week 1           Follow-up Information     Provider, No Known Follow up.    Contact information:  [facility] [address]                   Additional Instructions for the Follow-ups that You Need to Schedule     Ambulatory Referral to Home Health   As directed      Face to Face Visit Date:  12/19/2018    Follow-up Provider for Plan of Care?:  I treated the patient in an acute care facility and will not continue treatment after discharge.    Follow-up Provider:  ANA COLEMAN [[id number]]    Reason/Clinical Findings:  debility    Describe mobility limitations that make leaving home difficult:  weakness, fatigue    Nursing/Therapeutic Services Requested:  Skilled Nursing Physical Therapy  Medical / Social Work Dietician    Skilled nursing orders:  Mental health    Frequency:  1 Week 1                   Fernanda Al PA-C  12/19/18  1:20 PM          Please note that this discharge summary required more than 30 minutes to complete.

## 2018-12-19 NOTE — DISCHARGE PLACEMENT REQUEST
"Mary Lou Graves (83 y.o. Female)     Date of Birth Social Security Number Address Home Phone MRN    1935  [address] [place] [address]  [phone] [id number]    Yazdanism Marital Status          None        Admission Date Admission Type Admitting Provider Attending Provider Department, Room/Bed    18 Emergency Nicole Valdes MD Kfoury, Wajdi Samir, MD [facility], 215/1S    Discharge Date Discharge Disposition Discharge Destination         Home-Health Care [facility]              Attending Provider:  Nicole Valdes MD    Allergies:  No Known Allergies    Isolation:  None   Infection:  None   Code Status:  CPR    Ht:  177.8 cm (70\")   Wt:  44 kg (97 lb)    Admission Cmt:  None   Principal Problem:  None                Active Insurance as of 2018     Primary Coverage     Payor Plan Insurance Group Employer/Plan Group    MEDICARE MEDICARE A & B      Payor Plan Address Payor Plan Phone Number Payor Plan Fax Number Effective Dates    [address] [phone]  1989 - None Entered    [place] [address]       Subscriber Name Subscriber Birth Date Member ID       MARY LOU GRAVES 1935 [id number]                 Emergency Contacts      (Rel.) Home Phone Work Phone Mobile Phone    LEONIDES GRAVES (Son) -- -- [phone]    RACHAEL GRAVES (Spouse) -- -- [phone]        [facility]  [address] [place] [address]  Phone:  [phone]  Fax:   Date: Dec 19, 2018      Ambulatory Referral to Home Health     Patient:  Mary Lou rGaves MRN:  [id number]   [address]  [facility] [address] :  1935  SSN:    Phone: [phone] Sex:  F      INSURANCE PAYOR PLAN GROUP # SUBSCRIBER ID   Primary:    MEDICARE [id number]   [id number]      Referring Provider Information:  MARK PAGE Phone: [phone] Fax:       Referral Information:   # Visits:  1 Referral Type: Home " Health [42]   Urgency:  Routine Referral Reason: Specialty Services Required   Start Date: Dec 19, 2018 End Date:  To be determined by Insurer   Diagnosis: Disorientation (R41.0 [ICD-10-CM] 780.99 [ICD-9-CM])      Refer to Dept:   Refer to Provider:   Refer to Facility:       Face to Face Visit Date: 12/19/2018  Follow-up Provider for Plan of Care? I treated the patient in an acute care facility and will not continue treatment after discharge.  Follow-up Provider: ANA COLEMAN [[id number]]  Reason/Clinical Findings: debility  Describe mobility limitations that make leaving home difficult: weakness, fatigue  Nursing/Therapeutic Services Requested: Skilled Nursing  Nursing/Therapeutic Services Requested: Physical Therapy  Nursing/Therapeutic Services Requested: Medical / Social Work  Nursing/Therapeutic Services Requested: Dietician  Skilled nursing orders: Mental health  Frequency: 1 Week 1     This document serves as a request of services and does not constitute Insurance authorization or approval of services.  To determine eligibility, please contact the members Insurance carrier to verify and review coverage.     If you have medical questions regarding this request for services. Please contact [facility] at [phone] between the hours of 8:00am - 5:00pm (Mon-Fri).       Authorizing Provider:Fernanda Al PA-C  Authorizing Provider's NPI: [id number]  Order Entered By: Fernanda Al PA-C 12/19/2018  1:11 PM     Electronically signed by: Fernanda Al PA-C 12/19/2018  1:11 PM            Emergency Contact Information     Name Relation Home Work Mobile    LEONIDES GRAVES Son   [phone]    RACHAEL GRAVES Spouse   [phone]          Insurance Information                MEDICARE/MEDICARE A & B Phone: [phone]    Subscriber: Mary Lou Graves Subscriber#: [id number]    Group#:  Precert#:           Treatment Team     Provider Relationship Specialty Contact     Nicole Valdes MD Attending, Consulting Physician, Physician of Record Infectious Diseases  [phone]    Luz Chu, DIANELYS Respiratory Therapist --  [phone]    Salome Taylor, RN Registered Nurse --      Carlie Gaines Certified Nursing Assistant --      Kate Hui, RN Case Manager --      Tony Villatoro, RN Registered Nurse --      Carey Esteves CNA Unit [place] --            Problem List           Codes Noted - Resolved       Hospital    Acute UTI ICD-10-CM: N39.0  ICD-9-CM: 599.0 2018 - Present             History & Physical      Fernanda Al PA-C at 2018  9:12 AM                   HISTORY AND PHYSICAL        Patient Identification:  Name:  Mary Lou Graves  Age:  83 y.o.  Sex:  female  :  1935  MRN:  [id number]   Visit Number:  [id number]  Primary Care Physician:  Provider, No Known       Subjective     Subjective     Chief complaint:     Chief Complaint   Patient presents with   • Altered Mental Status       History of presenting illness:     The patient is an 83 year old female with history of history of CABG, pacemaker, hypertension, dyslipidemia, Alzheimer's dementia, takes Xarelto for a fib. She was brought to the ED by family for increased confusion. Found to have a UTI and initiated on Rocephin. Admitted to the observation unit for further evaluation and UTI treatment. VSS. Normal white count. Went into A fib overnight and converted back to sinus rhythm. On anticoagulation with Xarelto. Comfortable this morning with no complaints. Unfortunately unable to provide HPI or ROS.       ---------------------------------------------------------------------------------------------------------------------     Review Of Systems:    Unable to obtain as the patient is confused.  ---------------------------------------------------------------------------------------------------------------------     Past Medical History    Past Medical History:    Diagnosis Date   • Hyperlipidemia    • Hypertension        Past Surgical History    Past Surgical History:   Procedure Laterality Date   • HYSTERECTOMY         Family History    History reviewed. No pertinent family history.    Social History    Social History     Tobacco Use   • Smoking status: Current Every Day Smoker     Packs/day: 0.50   Substance Use Topics   • Alcohol use: No     Frequency: Never   • Drug use: No       Allergies    Patient has no known allergies.  ---------------------------------------------------------------------------------------------------------------------     Home Medications:    Prior to Admission Medications     Prescriptions Last Dose Informant Patient Reported? Taking?    atenolol (TENORMIN) 25 MG tablet 12/16/2018 Medication Bottle Yes Yes    Take 25 mg by mouth Daily.    atorvastatin (LIPITOR) 5 MG half tablet 12/16/2018 Medication Bottle Yes Yes    Take 5 mg by mouth Daily.    rivaroxaban (XARELTO) 20 MG tablet 12/16/2018 Medication Bottle Yes Yes    Take 20 mg by mouth Daily With Dinner.        ---------------------------------------------------------------------------------------------------------------------    Objective     Objective     Hospital Scheduled Meds:    atenolol 25 mg Oral Daily   atorvastatin 5 mg Oral Daily   rivaroxaban 20 mg Oral Daily With Dinner   sodium chloride 3 mL Intravenous Q12H        ---------------------------------------------------------------------------------------------------------------------   Vital Signs:  Temp:  [97.7 °F (36.5 °C)-98.1 °F (36.7 °C)] 97.8 °F (36.6 °C)  Heart Rate:  [61-97] 61  Resp:  [18] 18  BP: (116-167)/(50-84) 116/50  Mean Arterial Pressure (Non-Invasive) for the past 24 hrs (Last 3 readings):   Noninvasive MAP (mmHg)   12/18/18 0407 93   12/18/18 0000 96   12/17/18 2024 105     SpO2 Percentage    12/18/18 0407 12/18/18 0750 12/18/18 0759   SpO2: 95% 96% 90%     SpO2:  [84 %-100 %] 90 %  on   ;   Device (Oxygen  Therapy): room air    Body mass index is 13.92 kg/m².  Wt Readings from Last 3 Encounters:   12/17/18 44 kg (97 lb)     ---------------------------------------------------------------------------------------------------------------------     Physical Exam:    Constitutional:  Well-developed and well-nourished.  No respiratory distress.      HENT:  Head: Normocephalic and atraumatic.  Mouth:  Moist mucous membranes.    Eyes:  Conjunctivae and EOM are normal.  No scleral icterus.  Neck:  Neck supple.  No JVD present.    Cardiovascular:  Normal rate, regular rhythm and normal heart sounds with no murmur. No edema.  Pulmonary/Chest:  No respiratory distress, no wheezes, no crackles, with normal breath sounds and good air movement.  Abdominal:  Soft.  Bowel sounds are normal.  No distension and no tenderness.   Musculoskeletal:  No edema, no tenderness, and no deformity.  No swelling or redness of joints.  Neurological:  Alert and oriented to person, place, and time.  No facial droop.  No slurred speech.   Skin:  Skin is warm and dry.  No rash noted.  No pallor.   Psychiatric:  Normal mood and affect.  Behavior is normal.    ---------------------------------------------------------------------------------------------------------------------  I have personally reviewed the EKG/Telemetry strip  ---------------------------------------------------------------------------------------------------------------------   Results from last 7 days   Lab Units  12/17/18   1629   TROPONIN I ng/mL  0.028       Results from last 7 days   Lab Units  12/18/18   0050   CHOLESTEROL mg/dL  128   TRIGLYCERIDES mg/dL  78   HDL CHOL mg/dL  46*   LDL CHOL mg/dL  66     Results from last 7 days   Lab Units  12/17/18   1619   PH, ARTERIAL pH units  7.462*   PO2 ART mm Hg  80.4   PCO2, ARTERIAL mm Hg  35.6   HCO3 ART mmol/L  24.9     Results from last 7 days   Lab Units  12/18/18   0050  12/17/18   1629   WBC 10*3/mm3  6.86  7.93   HEMOGLOBIN g/dL   11.9*  11.7*   HEMATOCRIT %  36.7*  36.5*   MCV fL  92.2  93.6   MCHC g/dL  32.4*  32.1*   PLATELETS 10*3/mm3  155  152     Results from last 7 days   Lab Units  12/18/18   0050  12/17/18   1629   SODIUM mmol/L  136  138   POTASSIUM mmol/L  3.7  4.0   CHLORIDE mmol/L  103  104   CO2 mmol/L  27.1  27.9   BUN mg/dL  15  21   CREATININE mg/dL  0.75  0.98   EGFR IF NONAFRICN AM mL/min/1.73  74  54*   CALCIUM mg/dL  8.9  9.2   GLUCOSE mg/dL  89  106   ALBUMIN g/dL  3.90  4.20   BILIRUBIN mg/dL  1.0  1.3   ALK PHOS U/L  60  64   AST (SGOT) U/L  23  26   ALT (SGPT) U/L  11  17   Estimated Creatinine Clearance: 38.3 mL/min (by C-G formula based on SCr of 0.75 mg/dL).  No results found for: AMMONIA    No results found for: HGBA1C, POCGLU  No results found for: HGBA1C  Lab Results   Component Value Date    TSH 1.732 12/18/2018       Blood Culture   Date Value Ref Range Status   12/17/2018 No growth at less than 24 hours  Preliminary   12/17/2018 No growth at less than 24 hours  Preliminary     Urine Culture   Date Value Ref Range Status   12/17/2018 Normal Urogenital Kimberly  Preliminary              Pain Management Panel     There is no flowsheet data to display.        I have personally reviewed the above laboratory results.   ---------------------------------------------------------------------------------------------------------------------  Imaging Results (last 7 days)     Procedure Component Value Units Date/Time    CT Head Without Contrast [[id number]] Updated:  12/17/18 1649    XR Chest 2 View [[id number]] Updated:  12/17/18 1646        I have personally reviewed the above radiology results.   ---------------------------------------------------------------------------------------------------------------------      Assessment & Plan        Assessment/Plan       ASSESSMENT:    1. Altered mental status   2. UTI    PLAN:    Admitted to the observation unit for further evaluation and UTI treatment. VSS. Normal white count.  Went into A fib overnight and converted back to sinus rhythm. On anticoagulation with Xarelto.     Initiated Omnicef 300 mg PO BID x 5 days for UTI coverage.    Continue telemetry monitoring closely for a fibb.     Carotid ultrasound bilaterally ordered.     Patient's findings and recommendations were discussed with patient and nursing staff      Code Status:   Code Status and Medical Interventions:   Ordered at: 12/17/18 1946     Code Status:    CPR     Medical Interventions (Level of Support Prior to Arrest):    Full           Fernanda Al PA-C  12/18/18  9:12 AM      Electronically signed by Nicole Valdes MD at 12/19/2018  6:25 AM       ICU Vital Signs     Row Name 12/19/18 1149 12/19/18 0748 12/19/18 0720 12/19/18 0025 12/18/18 1945       Vitals    Temp  97.7 °F (36.5 °C)  97.7 °F (36.5 °C)  --  --  --    Temp src  Axillary  Oral  --  --  --    Pulse  69  64  --  69  --    Heart Rate Source  Monitor  Monitor  --  Monitor  --    Resp  18  18  --  18  --    Resp Rate Source  Visual  Visual  --  Visual  --    BP  135/62  108/60  --  161/78  --    Noninvasive MAP (mmHg)  77  72  --  94  --    BP Location  Left arm  Left arm  --  Left arm  --    BP Method  Automatic  Automatic  --  Automatic  --    Patient Position  Lying  Lying  --  Lying  --       Oxygen Therapy    SpO2  96 %  95 %  --  95 %  --    Pulse Oximetry Type  Intermittent  Continuous  --  --  --    Device (Oxygen Therapy)  room air  room air  room air  --  room air    Row Name 12/18/18 1900 12/18/18 1600                Vitals    Temp  97.8 °F (36.6 °C)  97.4 °F (36.3 °C)       Temp src  Oral  Oral       Pulse  61  62       Heart Rate Source  Monitor  Monitor       Resp  18  18       Resp Rate Source  Visual  Visual       BP  163/63  129/65       Noninvasive MAP (mmHg)  87  86       BP Location  Left arm  Left arm       BP Method  Automatic  Automatic       Patient Position  Sitting  Lying          Oxygen Therapy    SpO2  94 %  97 %            Intake & Output (last day)       12/18 0701 - 12/19 0700 12/19 0701 - 12/20 0700    P.O. 240     Total Intake(mL/kg) 240 (5.5)     Net +240           Urine Unmeasured Occurrence 1 x         Hospital Medications (active)       Dose Frequency Start End    acetaminophen (TYLENOL) tablet 650 mg 650 mg Every 4 Hours PRN 12/17/2018     Sig - Route: Take 2 tablets by mouth Every 4 (Four) Hours As Needed for Mild Pain . - Oral    Cosign for Ordering: Accepted by Nicole Valdes MD on 12/18/2018  9:27 AM    atenolol (TENORMIN) tablet 25 mg 25 mg Daily 12/18/2018     Sig - Route: Take 1 tablet by mouth Daily. - Oral    atorvastatin (LIPITOR) tablet 5 mg 5 mg Daily 12/18/2018     Sig - Route: Take 0.5 tablets by mouth Daily. - Oral    cefdinir (OMNICEF) capsule 300 mg 300 mg Every 12 Hours Scheduled 12/18/2018 12/23/2018    Sig - Route: Take 1 capsule by mouth Every 12 (Twelve) Hours. - Oral    haloperidol (HALDOL) tablet 2 mg 2 mg 2 Times Daily PRN 12/18/2018     Sig - Route: Take 1 tablet by mouth 2 (Two) Times a Day As Needed for Agitation. - Oral    LORazepam (ATIVAN) injection 0.5 mg 0.5 mg Once 12/18/2018 12/18/2018    Sig - Route: Infuse 0.25 mL into a venous catheter 1 (One) Time. - Intravenous    LORazepam (ATIVAN) injection 1 mg 1 mg Once 12/19/2018 12/19/2018    Sig - Route: Infuse 0.5 mL into a venous catheter 1 (One) Time. - Intravenous    nitroglycerin (NITROSTAT) SL tablet 0.4 mg 0.4 mg Every 5 Minutes PRN 12/17/2018     Sig - Route: Place 1 tablet under the tongue Every 5 (Five) Minutes As Needed for Chest Pain (Chest Pain With Systolic Blood Pressure Greater Than 100). - Sublingual    Cosign for Ordering: Accepted by Nicole Valdes MD on 12/18/2018  9:27 AM    ondansetron (ZOFRAN) injection 4 mg 4 mg Every 6 Hours PRN 12/17/2018     Sig - Route: Infuse 2 mL into a venous catheter Every 6 (Six) Hours As Needed for Nausea or Vomiting. - Intravenous    Cosign for Ordering: Accepted by Nicole Valdes  "MD Black on 12/18/2018  9:27 AM    Linked Group 1:  \"Or\" Linked Group Details        ondansetron (ZOFRAN) tablet 4 mg 4 mg Every 6 Hours PRN 12/17/2018     Sig - Route: Take 1 tablet by mouth Every 6 (Six) Hours As Needed for Nausea or Vomiting. - Oral    Cosign for Ordering: Accepted by Nicole Valdes MD on 12/18/2018  9:27 AM    Linked Group 1:  \"Or\" Linked Group Details        ondansetron ODT (ZOFRAN-ODT) disintegrating tablet 4 mg 4 mg Every 6 Hours PRN 12/17/2018     Sig - Route: Take 1 tablet by mouth Every 6 (Six) Hours As Needed for Nausea or Vomiting. - Oral    Cosign for Ordering: Accepted by Nicole Valdes MD on 12/18/2018  9:27 AM    Linked Group 1:  \"Or\" Linked Group Details        rivaroxaban (XARELTO) tablet 20 mg 20 mg Daily With Dinner 12/17/2018     Sig - Route: Take 1 tablet by mouth Daily With Dinner. - Oral    sodium chloride 0.9 % flush 10 mL 10 mL As Needed 12/17/2018     Sig - Route: Infuse 10 mL into a venous catheter As Needed for Line Care. - Intravenous    Linked Group 2:  \"And\" Linked Group Details        sodium chloride 0.9 % flush 3 mL 3 mL Every 12 Hours Scheduled 12/17/2018     Sig - Route: Infuse 3 mL into a venous catheter Every 12 (Twelve) Hours. - Intravenous    Cosign for Ordering: Accepted by Nicole Valdes MD on 12/18/2018  9:27 AM    sodium chloride 0.9 % flush 3-10 mL 3-10 mL As Needed 12/17/2018     Sig - Route: Infuse 3-10 mL into a venous catheter As Needed for Line Care. - Intravenous    Cosign for Ordering: Accepted by Nicole Valdes MD on 12/18/2018  9:27 AM            Lab Results (last 24 hours)     Procedure Component Value Units Date/Time    Beta Strep Culture, Throat - Swab, Throat [[id number]]  (Normal) Collected:  12/17/18 1746    Specimen:  Swab from Throat Updated:  12/19/18 1229     Throat Culture, Beta Strep No Group A Streptococcus Isolated    Urine Culture - Urine, Urine, Clean Catch [[id number]] Collected:  12/17/18 1946    Specimen: "  Urine, Clean Catch Updated:  12/19/18 1026     Urine Culture >100,000 CFU/mL Normal Urogenital Kimberly    Blood Culture - Blood, Arm, Left [[id number]] Collected:  12/17/18 2002    Specimen:  Blood from Arm, Left Updated:  12/18/18 2015     Blood Culture No growth at 24 hours    Blood Culture - Blood, Arm, Left [[id number]] Collected:  12/17/18 1951    Specimen:  Blood from Arm, Left Updated:  12/18/18 2015     Blood Culture No growth at 24 hours        Imaging Results (last 24 hours)     Procedure Component Value Units Date/Time    US Carotid Bilateral [[id number]] Collected:  12/18/18 1346     Updated:  12/18/18 1416    Narrative:       US CAROTID BILATERAL-     HISTORY:  Confusion; N39.0-Urinary tract infection, site not specified;  R41.0-Disorientation, unspecified     TECHNIQUE:  Multiple real-time color doppler images were obtained.  M-mode Doppler was used for velocity determination and spectral pattern.  Stenosis was evaluated using the NASCET or similar measurement  technique.     RIGHT SIDE: The common carotid artery was normal in caliber. Peak  systolic and diastolic velocities were 96 and 5 cm/s. At the carotid  bifurcation, there was some calcific plaque that did not significantly  narrow the lumen of the vessel. Peak systolic and diastolic velocities  in the ICA were 71 and 10 cm/s. Antegrade flow was demonstrated in the  right vertebral artery.     LEFT SIDE: The common carotid artery was normal in caliber. Peak  systolic and diastolic velocities were 87 and 8 cm/s. At the carotid  bifurcation, there was some calcific plaque and tortuosity in the  internal carotid artery. The velocity, however, was not elevated peaking  systolically at 83 and diastolically with 12 cm/s. Antegrade flow is  also noted in the vertebral artery.       Impression:       Mild atherosclerotic plaques are noted involving both  proximal internal carotid arteries.  By velocity measurements, they are  not felt to be hemodynamically  significant. Both vertebral arteries show  antegrade flow.     This report was finalized on 2018 2:14 PM by Dr. Varinder Silva II, MD.           ECG/EMG Results (last 24 hours)     ** No results found for the last 24 hours. **        Physician Progress Notes (last 24 hours) (Notes from 2018  1:33 PM through 2018  1:33 PM)     No notes of this type exist for this encounter.           Discharge Summary      Fernanda Al PA-C at 2018  1:12 PM                  DISCHARGE SUMMARY        Patient Identification:  Name:  Mary Lou Graves  Age:  83 y.o.  Sex:  female  :  1935  MRN:  [id number]  Visit Number:  [id number]    Date of Admission: 2018  Date of Discharge:  2018    PCP: Provider, No Known    Discharging Provider: Fernanda Al PA-C      Discharge Diagnoses     UTI      Consults/Procedures     Consults:   Consults     Date and Time Order Name Status Description    2018 IP General Consult (Use specialty-specific consult if known)            Procedures Performed:         History of Presenting Illness     Patient is a 83 y.o. female presented to [facility] complaining of increased confusion.  Please see the admitting history and physical for further details.      Hospital Course     Patient was admitted to the observation unit for further evaluation and UTI treatment. The patient continues to have confusion and required antipsychotics during hospitalization but after discussion with  and son this seems to be baseline for Ms. Graves. CT of the head showed atrophy and chronic small vessel ischemic changes but negative for any acute intracranial abnormalities. VSS. Normal white count. Went into A fib and converted back to sinus rhythm. On anticoagulation with Xarelto. Chest xray normal. Carotid ultrasound Mild atherosclerotic plaques are noted involving both proximal internal carotid arteries.  By velocity measurements, they are not felt  to be hemodynamically significant. Both vertebral arteries show  antegrade flow.    She was found to have a UTI and was initiated on Omnicef 300 mg PO BID to continue for 7 days.    Initially the patients  and son wish to take the patient home on discharge with home health but today they have expressed concern about the patient going home with her  as he is elderly as well and did not want her sent to live at her other home in [place] with her other son who does not seem to be appropriate to take care of the patient. Amee, with case management, and I spoke with the patients son and  at length about Mrs. Graves's plan of care. The patients son is agreeable to take her home with him and his significant other with home health until nursing home placement can be obtained. Amee will continue to work on placement after discharge. Patient to be discharged today to follow up with PCP within one week.       Discharge Vitals/Physical Examination     Vital Signs:  Temp:  [97.4 °F (36.3 °C)-97.8 °F (36.6 °C)] 97.7 °F (36.5 °C)  Heart Rate:  [61-69] 69  Resp:  [18] 18  BP: (108-163)/(60-78) 135/62  Mean Arterial Pressure (Non-Invasive) for the past 24 hrs (Last 3 readings):   Noninvasive MAP (mmHg)   12/19/18 1149 77   12/19/18 0748 72   12/19/18 0025 94     SpO2 Percentage    12/19/18 0025 12/19/18 0748 12/19/18 1149   SpO2: 95% 95% 96%     SpO2:  [94 %-97 %] 96 %  on   ;   Device (Oxygen Therapy): room air    Body mass index is 13.92 kg/m².  Wt Readings from Last 3 Encounters:   12/17/18 44 kg (97 lb)         Physical Exam:    Constitutional:  Frail. Confused. No respiratory distress.      HENT:  Head: Normocephalic and atraumatic.  Mouth:  Moist mucous membranes.    Eyes:  Conjunctivae and EOM are normal.  No scleral icterus.  Neck:  Neck supple.  No JVD present.    Cardiovascular:  Normal rate, regular rhythm and normal heart sounds with no murmur. No edema.  Pulmonary/Chest:  No respiratory  distress, no wheezes, no crackles, with normal breath sounds and good air movement.  Abdominal:  Soft.  Bowel sounds are normal.  No distension and no tenderness.   Musculoskeletal:  No edema, no tenderness, and no deformity.  No swelling or redness of joints.  Neurological: Confused  Skin:  Skin is warm and dry.  No rash noted.  No pallor.   Psychiatric:  Normal mood and affect.  Behavior is normal.      Pertinent Laboratory/Radiology Results     Pertinent Laboratory Results:    Results from last 7 days   Lab Units  12/17/18   1629   TROPONIN I ng/mL  0.028       Results from last 7 days   Lab Units  12/18/18   0050   CHOLESTEROL mg/dL  128   TRIGLYCERIDES mg/dL  78   HDL CHOL mg/dL  46*   LDL CHOL mg/dL  66     Results from last 7 days   Lab Units  12/17/18   1619   PH, ARTERIAL pH units  7.462*   PO2 ART mm Hg  80.4   PCO2, ARTERIAL mm Hg  35.6   HCO3 ART mmol/L  24.9     Results from last 7 days   Lab Units  12/18/18   0050  12/17/18   1629   WBC 10*3/mm3  6.86  7.93   HEMOGLOBIN g/dL  11.9*  11.7*   HEMATOCRIT %  36.7*  36.5*   MCV fL  92.2  93.6   MCHC g/dL  32.4*  32.1*   PLATELETS 10*3/mm3  155  152     Results from last 7 days   Lab Units  12/18/18   0050  12/17/18   1629   SODIUM mmol/L  136  138   POTASSIUM mmol/L  3.7  4.0   CHLORIDE mmol/L  103  104   CO2 mmol/L  27.1  27.9   BUN mg/dL  15  21   CREATININE mg/dL  0.75  0.98   EGFR IF NONAFRICN AM mL/min/1.73  74  54*   CALCIUM mg/dL  8.9  9.2   GLUCOSE mg/dL  89  106   ALBUMIN g/dL  3.90  4.20   BILIRUBIN mg/dL  1.0  1.3   ALK PHOS U/L  60  64   AST (SGOT) U/L  23  26   ALT (SGPT) U/L  11  17   Estimated Creatinine Clearance: 38.3 mL/min (by C-G formula based on SCr of 0.75 mg/dL).  No results found for: AMMONIA    No results found for: HGBA1C, POCGLU  No results found for: HGBA1C  Lab Results   Component Value Date    TSH 1.732 12/18/2018       Blood Culture   Date Value Ref Range Status   12/17/2018 No growth at 24 hours  Preliminary   12/17/2018 No  growth at 24 hours  Preliminary     Urine Culture   Date Value Ref Range Status   12/17/2018 >100,000 CFU/mL Normal Urogenital Kimberly  Final              Pain Management Panel     There is no flowsheet data to display.          Pertinent Radiology Results:  Imaging Results (all)     Procedure Component Value Units Date/Time    US Carotid Bilateral [[id number]] Collected:  12/18/18 1346     Updated:  12/18/18 1416    Narrative:       US CAROTID BILATERAL-     HISTORY:  Confusion; N39.0-Urinary tract infection, site not specified;  R41.0-Disorientation, unspecified     TECHNIQUE:  Multiple real-time color doppler images were obtained.  M-mode Doppler was used for velocity determination and spectral pattern.  Stenosis was evaluated using the NASCET or similar measurement  technique.     RIGHT SIDE: The common carotid artery was normal in caliber. Peak  systolic and diastolic velocities were 96 and 5 cm/s. At the carotid  bifurcation, there was some calcific plaque that did not significantly  narrow the lumen of the vessel. Peak systolic and diastolic velocities  in the ICA were 71 and 10 cm/s. Antegrade flow was demonstrated in the  right vertebral artery.     LEFT SIDE: The common carotid artery was normal in caliber. Peak  systolic and diastolic velocities were 87 and 8 cm/s. At the carotid  bifurcation, there was some calcific plaque and tortuosity in the  internal carotid artery. The velocity, however, was not elevated peaking  systolically at 83 and diastolically with 12 cm/s. Antegrade flow is  also noted in the vertebral artery.       Impression:       Mild atherosclerotic plaques are noted involving both  proximal internal carotid arteries.  By velocity measurements, they are  not felt to be hemodynamically significant. Both vertebral arteries show  antegrade flow.     This report was finalized on 12/18/2018 2:14 PM by Dr. Varinder Silva II, MD.       XR Chest 2 View [[id number]] Collected:  12/18/18 0973      Updated:  12/18/18 0923    Narrative:       EXAMINATION: XR CHEST 2 VW-      CLINICAL INDICATION:     AMS     TECHNIQUE:  XR CHEST 2 VW-      COMPARISON: NONE      FINDINGS:   Coarse interstitial markings suggestive of chronic interstitial lung  disease.  Heart and mediastinum contours are unremarkable.  No pleural effusion.  No pneumothorax.   Bony and soft tissue structures are unremarkable.       Impression:       No radiographic evidence of acute cardiac or pulmonary  disease.     This report was finalized on 12/18/2018 9:21 AM by Dr. Kendall Lawton MD.       CT Head Without Contrast [[id number]] Collected:  12/18/18 0920     Updated:  12/18/18 0923    Narrative:          EXAMINATION: CT HEAD WO CONTRAST-      CLINICAL INDICATION:     AMS     TECHNIQUE: Contiguous axial CT images of the head were obtained without  contrast administration.      Radiation dose reduction techniques were utilized per ALARA protocol.  Automated exposure control was initiated through either or [employer] or  [employer] software packages by  protocol.       1095.9 mGy.cm     COMPARISON:  None.       FINDINGS: Generalized cerebral atrophy is present. There is no mass  effect, midline displacement, or hydrocephalus. There are patchy areas  of decreased density within the periventricular white matter which  likely reflect chronic small vessel ischemic changes. There is no CT  evidence of acute infarct or hemorrhage. Bone windows reveal no osseous  abnormalities or fractures.        Impression:       Atrophy and chronic small vessel ischemic change, but there  are no acute intracranial abnormalities identified.         This report was finalized on 12/18/2018 9:20 AM by Dr. Kendall Lawton MD.             Test Results Pending at Discharge:   Order Current Status    Blood Culture - Blood, Arm, Left Preliminary result    Blood Culture - Blood, Arm, Left Preliminary result          Discharge Disposition/Discharge Medications/Discharge  Appointments     Discharge Disposition:   [place] [facility] [place]    Condition at Discharge:  Stable, much improved with no issues today     Code Status While Inpatient:  Code Status and Medical Interventions:   Ordered at: 12/17/18 1946     Code Status:    CPR     Medical Interventions (Level of Support Prior to Arrest):    Full       Discharge Medications:     Discharge Medications      New Medications      Instructions Start Date   cefdinir 300 MG capsule  Commonly known as:  OMNICEF   300 mg, Oral, Every 12 Hours Scheduled         Continue These Medications      Instructions Start Date   atenolol 25 MG tablet  Commonly known as:  TENORMIN   25 mg, Oral, Daily      atorvastatin 5 MG half tablet  Commonly known as:  LIPITOR   5 mg, Oral, Daily      rivaroxaban 20 MG tablet  Commonly known as:  XARELTO   20 mg, Oral, Daily With Dinner             Discharge Diet:  Diet Instructions     Diet: Regular      Discharge Diet:  Regular          Discharge Activity:  Activity Instructions     Activity as Tolerated            Discharge Appointments:      Additional Instructions for the Follow-ups that You Need to Schedule     Ambulatory Referral to Home Health   As directed      Face to Face Visit Date:  12/19/2018    Follow-up Provider for Plan of Care?:  I treated the patient in an acute care facility and will not continue treatment after discharge.    Follow-up Provider:  ANA COLEMAN [[id number]]    Reason/Clinical Findings:  debility    Describe mobility limitations that make leaving home difficult:  weakness, fatigue    Nursing/Therapeutic Services Requested:  Skilled Nursing Physical Therapy Medical / Social Work Dietician    Skilled nursing orders:  Mental health    Frequency:  1 Week 1           Follow-up Information     Provider, No Known Follow up.    Contact information:  [facility]  [place] [id number]                   Additional Instructions for the Follow-ups that You Need to Schedule     Ambulatory  Referral to Home Health   As directed      Face to Face Visit Date:  12/19/2018    Follow-up Provider for Plan of Care?:  I treated the patient in an acute care facility and will not continue treatment after discharge.    Follow-up Provider:  ANA COLEMAN [[id number]]    Reason/Clinical Findings:  debility    Describe mobility limitations that make leaving home difficult:  weakness, fatigue    Nursing/Therapeutic Services Requested:  Skilled Nursing Physical Therapy Medical / Social Work Dietician    Skilled nursing orders:  Mental health    Frequency:  1 Week 1                   Fernanda Al PA-C  12/19/18  1:20 PM          Please note that this discharge summary required more than 30 minutes to complete.      Electronically signed by Fernanda Al PA-C at 12/19/2018  1:32 PM       Discharge Order (From admission, onward)    Start     Ordered    12/19/18 1301  Discharge patient  Once     Expected Discharge Date:  12/19/18    Expected Discharge Time:  Afternoon    Discharge Disposition:  Home-Health Care [facility] [place]    Physician of Record for Attribution - Please select from Treatment Team:  VIKTOR MONTAGUE [[id number]]    Review needed by CMO to determine Physician of Record:  No       Question Answer Comment   Physician of Record for Attribution - Please select from Treatment Team VIKTOR MONTAGUE    Review needed by CMO to determine Physician of Record No        12/19/18 0277

## 2018-12-19 NOTE — PHARMACY PATIENT ASSISTANCE
Pharmacy looked into pricing for home medication xarelto.  Her copay is around $31 when she isn't in the donut hole.  Currently she is and she just paid $144.75.  We spoke with her son and he is coming in to discuss low income subsidy and other programs.  Also, we profiled a free trial card for her next fill at [facility].    Thank you;  Valeria Barlow [facility]  12/18/18  7:25 PM

## 2018-12-21 PROCEDURE — 96360 HYDRATION IV INFUSION INIT: CPT

## 2018-12-21 PROCEDURE — 36415 COLL VENOUS BLD VENIPUNCTURE: CPT

## 2018-12-21 PROCEDURE — 99285 EMERGENCY DEPT VISIT HI MDM: CPT

## 2018-12-22 ENCOUNTER — APPOINTMENT (OUTPATIENT)
Dept: GENERAL RADIOLOGY | Facility: HOSPITAL | Age: 83
End: 2018-12-22

## 2018-12-22 ENCOUNTER — HOSPITAL ENCOUNTER (EMERGENCY)
Facility: HOSPITAL | Age: 83
Discharge: ANOTHER HOSPITAL | End: 2018-12-22
Attending: EMERGENCY MEDICINE | Admitting: EMERGENCY MEDICINE

## 2018-12-22 VITALS
HEART RATE: 69 BPM | WEIGHT: 97 LBS | TEMPERATURE: 98.3 F | HEIGHT: 69 IN | OXYGEN SATURATION: 97 % | DIASTOLIC BLOOD PRESSURE: 70 MMHG | BODY MASS INDEX: 14.37 KG/M2 | SYSTOLIC BLOOD PRESSURE: 146 MMHG | RESPIRATION RATE: 16 BRPM

## 2018-12-22 DIAGNOSIS — R41.0 DISORIENTATION: Primary | ICD-10-CM

## 2018-12-22 LAB
6-ACETYL MORPHINE: NEGATIVE
ALBUMIN SERPL-MCNC: 4 G/DL (ref 3.4–4.8)
ALBUMIN/GLOB SERPL: 1.7 G/DL (ref 1.5–2.5)
ALP SERPL-CCNC: 57 U/L (ref 35–104)
ALT SERPL W P-5'-P-CCNC: 13 U/L (ref 10–36)
AMPHET+METHAMPHET UR QL: NEGATIVE
ANION GAP SERPL CALCULATED.3IONS-SCNC: 3.1 MMOL/L (ref 3.6–11.2)
AST SERPL-CCNC: 23 U/L (ref 10–30)
BACTERIA SPEC AEROBE CULT: NORMAL
BACTERIA SPEC AEROBE CULT: NORMAL
BACTERIA UR QL AUTO: ABNORMAL /HPF
BARBITURATES UR QL SCN: NEGATIVE
BASOPHILS # BLD AUTO: 0.02 10*3/MM3 (ref 0–0.3)
BASOPHILS NFR BLD AUTO: 0.3 % (ref 0–2)
BENZODIAZ UR QL SCN: NEGATIVE
BILIRUB SERPL-MCNC: 0.3 MG/DL (ref 0.2–1.8)
BILIRUB UR QL STRIP: NEGATIVE
BUN BLD-MCNC: 34 MG/DL (ref 7–21)
BUN/CREAT SERPL: 33 (ref 7–25)
BUPRENORPHINE SERPL-MCNC: NEGATIVE NG/ML
CALCIUM SPEC-SCNC: 9 MG/DL (ref 7.7–10)
CANNABINOIDS SERPL QL: NEGATIVE
CHLORIDE SERPL-SCNC: 105 MMOL/L (ref 99–112)
CLARITY UR: CLEAR
CO2 SERPL-SCNC: 29.9 MMOL/L (ref 24.3–31.9)
COCAINE UR QL: NEGATIVE
COLOR UR: YELLOW
CREAT BLD-MCNC: 1.03 MG/DL (ref 0.43–1.29)
D-LACTATE SERPL-SCNC: 0.7 MMOL/L (ref 0.5–2)
DEPRECATED RDW RBC AUTO: 44.2 FL (ref 37–54)
EOSINOPHIL # BLD AUTO: 0.03 10*3/MM3 (ref 0–0.7)
EOSINOPHIL NFR BLD AUTO: 0.4 % (ref 0–7)
ERYTHROCYTE [DISTWIDTH] IN BLOOD BY AUTOMATED COUNT: 13.3 % (ref 11.5–14.5)
ETHANOL BLD-MCNC: <10 MG/DL
ETHANOL UR QL: <0.01 %
GFR SERPL CREATININE-BSD FRML MDRD: 51 ML/MIN/1.73
GLOBULIN UR ELPH-MCNC: 2.4 GM/DL
GLUCOSE BLD-MCNC: 100 MG/DL (ref 70–110)
GLUCOSE UR STRIP-MCNC: NEGATIVE MG/DL
HCT VFR BLD AUTO: 35.9 % (ref 37–47)
HGB BLD-MCNC: 11.7 G/DL (ref 12–16)
HGB UR QL STRIP.AUTO: NEGATIVE
HYALINE CASTS UR QL AUTO: ABNORMAL /LPF
IMM GRANULOCYTES # BLD AUTO: 0.01 10*3/MM3 (ref 0–0.03)
IMM GRANULOCYTES NFR BLD AUTO: 0.1 % (ref 0–0.5)
KETONES UR QL STRIP: NEGATIVE
LEUKOCYTE ESTERASE UR QL STRIP.AUTO: ABNORMAL
LYMPHOCYTES # BLD AUTO: 1.66 10*3/MM3 (ref 1–3)
LYMPHOCYTES NFR BLD AUTO: 22.2 % (ref 16–46)
MCH RBC QN AUTO: 30.2 PG (ref 27–33)
MCHC RBC AUTO-ENTMCNC: 32.6 G/DL (ref 33–37)
MCV RBC AUTO: 92.8 FL (ref 80–94)
METHADONE UR QL SCN: NEGATIVE
MONOCYTES # BLD AUTO: 0.58 10*3/MM3 (ref 0.1–0.9)
MONOCYTES NFR BLD AUTO: 7.7 % (ref 0–12)
NEUTROPHILS # BLD AUTO: 5.19 10*3/MM3 (ref 1.4–6.5)
NEUTROPHILS NFR BLD AUTO: 69.3 % (ref 40–75)
NITRITE UR QL STRIP: NEGATIVE
OPIATES UR QL: NEGATIVE
OSMOLALITY SERPL CALC.SUM OF ELEC: 283.4 MOSM/KG (ref 273–305)
OXYCODONE UR QL SCN: NEGATIVE
PCP UR QL SCN: NEGATIVE
PH UR STRIP.AUTO: 7 [PH] (ref 5–8)
PLATELET # BLD AUTO: 185 10*3/MM3 (ref 130–400)
PMV BLD AUTO: 9.7 FL (ref 6–10)
POTASSIUM BLD-SCNC: 4.3 MMOL/L (ref 3.5–5.3)
PROT SERPL-MCNC: 6.4 G/DL (ref 6–8)
PROT UR QL STRIP: NEGATIVE
RBC # BLD AUTO: 3.87 10*6/MM3 (ref 4.2–5.4)
RBC # UR: ABNORMAL /HPF
REF LAB TEST METHOD: ABNORMAL
SODIUM BLD-SCNC: 138 MMOL/L (ref 135–153)
SP GR UR STRIP: 1.02 (ref 1–1.03)
SQUAMOUS #/AREA URNS HPF: ABNORMAL /HPF
UROBILINOGEN UR QL STRIP: ABNORMAL
WBC NRBC COR # BLD: 7.49 10*3/MM3 (ref 4.5–12.5)
WBC UR QL AUTO: ABNORMAL /HPF

## 2018-12-22 PROCEDURE — 36415 COLL VENOUS BLD VENIPUNCTURE: CPT

## 2018-12-22 PROCEDURE — 80307 DRUG TEST PRSMV CHEM ANLYZR: CPT | Performed by: PHYSICIAN ASSISTANT

## 2018-12-22 PROCEDURE — 87040 BLOOD CULTURE FOR BACTERIA: CPT | Performed by: PHYSICIAN ASSISTANT

## 2018-12-22 PROCEDURE — 80053 COMPREHEN METABOLIC PANEL: CPT | Performed by: PHYSICIAN ASSISTANT

## 2018-12-22 PROCEDURE — 81001 URINALYSIS AUTO W/SCOPE: CPT | Performed by: PHYSICIAN ASSISTANT

## 2018-12-22 PROCEDURE — 87086 URINE CULTURE/COLONY COUNT: CPT | Performed by: PHYSICIAN ASSISTANT

## 2018-12-22 PROCEDURE — 85025 COMPLETE CBC W/AUTO DIFF WBC: CPT | Performed by: PHYSICIAN ASSISTANT

## 2018-12-22 PROCEDURE — 71045 X-RAY EXAM CHEST 1 VIEW: CPT

## 2018-12-22 PROCEDURE — 96360 HYDRATION IV INFUSION INIT: CPT

## 2018-12-22 PROCEDURE — 71045 X-RAY EXAM CHEST 1 VIEW: CPT | Performed by: RADIOLOGY

## 2018-12-22 PROCEDURE — 83605 ASSAY OF LACTIC ACID: CPT | Performed by: PHYSICIAN ASSISTANT

## 2018-12-22 RX ORDER — HYDROXYZINE HYDROCHLORIDE 25 MG/1
25 TABLET, FILM COATED ORAL ONCE
Status: COMPLETED | OUTPATIENT
Start: 2018-12-22 | End: 2018-12-22

## 2018-12-22 RX ORDER — CEFTRIAXONE 1 G/1
1000 INJECTION, POWDER, FOR SOLUTION INTRAMUSCULAR; INTRAVENOUS ONCE
Status: DISCONTINUED | OUTPATIENT
Start: 2018-12-22 | End: 2018-12-22

## 2018-12-22 RX ORDER — LIDOCAINE HYDROCHLORIDE 10 MG/ML
2.1 INJECTION, SOLUTION EPIDURAL; INFILTRATION; INTRACAUDAL; PERINEURAL ONCE
Status: DISCONTINUED | OUTPATIENT
Start: 2018-12-22 | End: 2018-12-22

## 2018-12-22 RX ORDER — SODIUM CHLORIDE 0.9 % (FLUSH) 0.9 %
10 SYRINGE (ML) INJECTION AS NEEDED
Status: DISCONTINUED | OUTPATIENT
Start: 2018-12-22 | End: 2018-12-22 | Stop reason: HOSPADM

## 2018-12-22 RX ADMIN — HYDROXYZINE HYDROCHLORIDE 25 MG: 25 TABLET, FILM COATED ORAL at 03:58

## 2018-12-22 RX ADMIN — SODIUM CHLORIDE 500 ML: 9 INJECTION, SOLUTION INTRAVENOUS at 02:40

## 2018-12-22 NOTE — NURSING NOTE
Intake assessment complete. Patient presented to ER with son (Devendra Graves) with altered mental status. Son says he brought patient here because she hasn't slept in 3 days and is very confused. Son also stated today she held a knife to her neck and said she wanted to kill herself.  Patient is disoriented to person, place, time, and situation. Patient is unable to answer any assessment questions at this time. Patients son reports that this is sudden onset and has no previous mental health hx.

## 2018-12-22 NOTE — NURSING NOTE
Per Fang MAYER, she spoke with Dr. Brewer. Instructed to attempt to place pt in geriatric unit if possible due to her age and history. Instructed that he has no luck with [facility] answering the phone. Will attempt again shortly. Spoke with family. They are agreeable to allow pt to go to geriatric unit if it will help her cause they want her to get the best care. Emotional support provided to family.

## 2018-12-22 NOTE — NURSING NOTE
Attempted to call [facility]. No answer(x2). Called and spoke to michelle at [facility]. Clinical faxed. Waiting on reply. Pt resting quietly in ED5 with family at side.

## 2018-12-22 NOTE — NURSING NOTE
[facility] called. Eta 1 hour give or take. Family made aware. Family reports that they gave consent and that they need to go to try to get poa. Instructed them I would watch her until they arrived.

## 2018-12-22 NOTE — ED PROVIDER NOTES
Subjective     Altered Mental Status   Presenting symptoms: confusion, disorientation and memory loss    Presenting symptoms: no behavior changes, no combativeness, no lethargy, no partial responsiveness and no unresponsiveness    Severity:  Moderate  Most recent episode:  Today  Episode history:  Continuous  Duration:  3 months  Timing:  Constant  Progression:  Worsening  Chronicity:  Chronic  Context: dementia and recent illness    Context: not alcohol use, not drug use, not head injury, not homeless, taking medications as prescribed, not nursing home resident, not recent change in medication and not recent infection    Context comment:  Diagnosed with UTI when seen here on 12/17/18  Associated symptoms: no abdominal pain, normal movement, no agitation, no bladder incontinence, no decreased appetite, no depression, no difficulty breathing, no eye deviation, no fever, no hallucinations, no headaches, no light-headedness, no nausea, no palpitations, no rash, no seizures, no slurred speech, no suicidal behavior, no visual change, no vomiting and no weakness        Review of Systems   Constitutional: Negative.  Negative for decreased appetite and fever.   HENT: Negative.    Respiratory: Negative.    Cardiovascular: Negative.  Negative for chest pain and palpitations.   Gastrointestinal: Negative.  Negative for abdominal pain, nausea and vomiting.   Endocrine: Negative.    Genitourinary: Negative.  Negative for bladder incontinence and dysuria.   Skin: Negative.  Negative for rash.   Neurological: Negative.  Negative for seizures, weakness, light-headedness and headaches.   Psychiatric/Behavioral: Positive for confusion and memory loss. Negative for agitation, behavioral problems, decreased concentration, dysphoric mood, hallucinations, self-injury, sleep disturbance and suicidal ideas. The patient is not nervous/anxious and is not hyperactive.    All other systems reviewed and are negative.      Past Medical History:    Diagnosis Date   • Dementia    • Hyperlipidemia    • Hypertension        No Known Allergies    Past Surgical History:   Procedure Laterality Date   • HYSTERECTOMY         History reviewed. No pertinent family history.    Social History     Socioeconomic History   • Marital status:      Spouse name: Not on file   • Number of children: Not on file   • Years of education: Not on file   • Highest education level: Not on file   Tobacco Use   • Smoking status: Current Every Day Smoker     Packs/day: 0.50   • Smokeless tobacco: Never Used   Substance and Sexual Activity   • Alcohol use: No     Frequency: Never     Comment: denies   • Drug use: No     Comment: denies   • Sexual activity: No           Objective   Physical Exam   Constitutional: She is oriented to person, place, and time. She appears well-developed and well-nourished. No distress.   Patient isn't oriented to self, location or time.    HENT:   Head: Normocephalic and atraumatic.   Right Ear: External ear normal.   Left Ear: External ear normal.   Nose: Nose normal.   Eyes: Conjunctivae and EOM are normal. Pupils are equal, round, and reactive to light.   Neck: Normal range of motion. Neck supple. No JVD present. No tracheal deviation present.   Cardiovascular: Normal rate, regular rhythm and normal heart sounds. Exam reveals no gallop and no friction rub.   No murmur heard.  Pulmonary/Chest: Effort normal and breath sounds normal. No stridor. No respiratory distress. She has no wheezes. She has no rales. She exhibits no tenderness.   Abdominal: Soft. Bowel sounds are normal. She exhibits no distension and no mass. There is no tenderness. There is no guarding.   Musculoskeletal: Normal range of motion. She exhibits no edema or deformity.   Neurological: She is alert and oriented to person, place, and time. No cranial nerve deficit.   Skin: Skin is warm and dry. No rash noted. She is not diaphoretic. No erythema. No pallor.   Psychiatric: She has a  normal mood and affect. Her behavior is normal. Thought content normal.   Nursing note and vitals reviewed.      Procedures           ED Course  ED Course as of Dec 22 0649   Sat Dec 22, 2018   0222 No acute findings per Dr. Bill.  XR Chest 1 View [MM]   0532 Patient has been accepted for transfer to [facility].   [MM]      ED Course User Index  [MM] Leesa Camarena PA                  MDM  Number of Diagnoses or Management Options  Disorientation:      Amount and/or Complexity of Data Reviewed  Clinical lab tests: ordered and reviewed  Tests in the radiology section of CPT®: ordered and reviewed  Decide to obtain previous medical records or to obtain history from someone other than the patient: yes  Discuss the patient with other providers: yes          Final diagnoses:   Disorientation            Leesa Camarena PA  12/22/18 0649

## 2018-12-22 NOTE — NURSING NOTE
Family remains at pt side. Pt resting quietly. Family and staff assisted pt up to bathroom and back to bed.

## 2018-12-22 NOTE — NURSING NOTE
Patient accepted at [facility] per Dr. Hudson. Family on phone given verbal consent. ED provider and house supervisor made aware of plan of care.

## 2018-12-22 NOTE — NURSING NOTE
Per [place]. Due to mental status, would need two family members to sign before they could accept the pt. Unable to get ahold of anyone. Instructed not able to take her at this time. Son aware and says they called him and told them he is it. Explained that I am attempting to [place].

## 2018-12-24 LAB — BACTERIA SPEC AEROBE CULT: NO GROWTH

## 2018-12-27 LAB
BACTERIA SPEC AEROBE CULT: NORMAL
BACTERIA SPEC AEROBE CULT: NORMAL

## 2019-02-08 ENCOUNTER — HOSPITAL ENCOUNTER (INPATIENT)
Facility: HOSPITAL | Age: 84
LOS: 5 days | Discharge: NURSING, REHAB OR LONG-TERM CARE | End: 2019-02-14
Attending: EMERGENCY MEDICINE | Admitting: INTERNAL MEDICINE

## 2019-02-08 ENCOUNTER — APPOINTMENT (OUTPATIENT)
Dept: GENERAL RADIOLOGY | Facility: HOSPITAL | Age: 84
End: 2019-02-08

## 2019-02-08 DIAGNOSIS — R62.7 FAILURE TO THRIVE IN ADULT: Primary | ICD-10-CM

## 2019-02-08 DIAGNOSIS — E86.0 DEHYDRATION: ICD-10-CM

## 2019-02-08 DIAGNOSIS — F03.91 DEMENTIA WITH BEHAVIORAL DISTURBANCE, UNSPECIFIED DEMENTIA TYPE: ICD-10-CM

## 2019-02-08 DIAGNOSIS — J90 PLEURAL EFFUSION, BILATERAL: ICD-10-CM

## 2019-02-08 LAB
6-ACETYL MORPHINE: NEGATIVE
ALBUMIN SERPL-MCNC: 3.6 G/DL (ref 3.4–4.8)
ALBUMIN/GLOB SERPL: 1.4 G/DL (ref 1.5–2.5)
ALP SERPL-CCNC: 96 U/L (ref 35–104)
ALT SERPL W P-5'-P-CCNC: 56 U/L (ref 10–36)
AMPHET+METHAMPHET UR QL: NEGATIVE
ANION GAP SERPL CALCULATED.3IONS-SCNC: 8.1 MMOL/L (ref 3.6–11.2)
AST SERPL-CCNC: 49 U/L (ref 10–30)
BACTERIA UR QL AUTO: ABNORMAL /HPF
BARBITURATES UR QL SCN: NEGATIVE
BASOPHILS # BLD AUTO: 0.03 10*3/MM3 (ref 0–0.3)
BASOPHILS NFR BLD AUTO: 0.3 % (ref 0–2)
BENZODIAZ UR QL SCN: NEGATIVE
BILIRUB SERPL-MCNC: 0.6 MG/DL (ref 0.2–1.8)
BILIRUB UR QL STRIP: NEGATIVE
BNP SERPL-MCNC: 596 PG/ML (ref 0–100)
BUN BLD-MCNC: 22 MG/DL (ref 7–21)
BUN/CREAT SERPL: 33.3 (ref 7–25)
BUPRENORPHINE SERPL-MCNC: NEGATIVE NG/ML
CALCIUM SPEC-SCNC: 8.6 MG/DL (ref 7.7–10)
CANNABINOIDS SERPL QL: NEGATIVE
CHLORIDE SERPL-SCNC: 105 MMOL/L (ref 99–112)
CLARITY UR: CLEAR
CO2 SERPL-SCNC: 23.9 MMOL/L (ref 24.3–31.9)
COCAINE UR QL: NEGATIVE
COLOR UR: ABNORMAL
CREAT BLD-MCNC: 0.66 MG/DL (ref 0.43–1.29)
DEPRECATED RDW RBC AUTO: 43.1 FL (ref 37–54)
EOSINOPHIL # BLD AUTO: 0.15 10*3/MM3 (ref 0–0.7)
EOSINOPHIL NFR BLD AUTO: 1.5 % (ref 0–7)
ERYTHROCYTE [DISTWIDTH] IN BLOOD BY AUTOMATED COUNT: 13.7 % (ref 11.5–14.5)
GFR SERPL CREATININE-BSD FRML MDRD: 86 ML/MIN/1.73
GLOBULIN UR ELPH-MCNC: 2.6 GM/DL
GLUCOSE BLD-MCNC: 106 MG/DL (ref 70–110)
GLUCOSE UR STRIP-MCNC: NEGATIVE MG/DL
HCT VFR BLD AUTO: 33.3 % (ref 37–47)
HGB BLD-MCNC: 10.8 G/DL (ref 12–16)
HGB UR QL STRIP.AUTO: NEGATIVE
HYALINE CASTS UR QL AUTO: ABNORMAL /LPF
IMM GRANULOCYTES # BLD AUTO: 0.05 10*3/MM3 (ref 0–0.03)
IMM GRANULOCYTES NFR BLD AUTO: 0.5 % (ref 0–0.5)
KETONES UR QL STRIP: ABNORMAL
LEUKOCYTE ESTERASE UR QL STRIP.AUTO: ABNORMAL
LYMPHOCYTES # BLD AUTO: 0.64 10*3/MM3 (ref 1–3)
LYMPHOCYTES NFR BLD AUTO: 6.5 % (ref 16–46)
MCH RBC QN AUTO: 28.9 PG (ref 27–33)
MCHC RBC AUTO-ENTMCNC: 32.4 G/DL (ref 33–37)
MCV RBC AUTO: 89 FL (ref 80–94)
METHADONE UR QL SCN: NEGATIVE
MONOCYTES # BLD AUTO: 0.96 10*3/MM3 (ref 0.1–0.9)
MONOCYTES NFR BLD AUTO: 9.8 % (ref 0–12)
NEUTROPHILS # BLD AUTO: 7.98 10*3/MM3 (ref 1.4–6.5)
NEUTROPHILS NFR BLD AUTO: 81.4 % (ref 40–75)
NITRITE UR QL STRIP: NEGATIVE
OPIATES UR QL: NEGATIVE
OSMOLALITY SERPL CALC.SUM OF ELEC: 277.6 MOSM/KG (ref 273–305)
OXYCODONE UR QL SCN: NEGATIVE
PCP UR QL SCN: NEGATIVE
PH UR STRIP.AUTO: 6 [PH] (ref 5–8)
PLATELET # BLD AUTO: 251 10*3/MM3 (ref 130–400)
PMV BLD AUTO: 10 FL (ref 6–10)
POTASSIUM BLD-SCNC: 3.8 MMOL/L (ref 3.5–5.3)
PROT SERPL-MCNC: 6.2 G/DL (ref 6–8)
PROT UR QL STRIP: NEGATIVE
RBC # BLD AUTO: 3.74 10*6/MM3 (ref 4.2–5.4)
RBC # UR: ABNORMAL /HPF
REF LAB TEST METHOD: ABNORMAL
SODIUM BLD-SCNC: 137 MMOL/L (ref 135–153)
SP GR UR STRIP: 1.02 (ref 1–1.03)
SQUAMOUS #/AREA URNS HPF: ABNORMAL /HPF
UROBILINOGEN UR QL STRIP: ABNORMAL
WBC NRBC COR # BLD: 9.81 10*3/MM3 (ref 4.5–12.5)
WBC UR QL AUTO: ABNORMAL /HPF

## 2019-02-08 PROCEDURE — 71045 X-RAY EXAM CHEST 1 VIEW: CPT

## 2019-02-08 PROCEDURE — 80307 DRUG TEST PRSMV CHEM ANLYZR: CPT | Performed by: PHYSICIAN ASSISTANT

## 2019-02-08 PROCEDURE — G0378 HOSPITAL OBSERVATION PER HR: HCPCS

## 2019-02-08 PROCEDURE — 71045 X-RAY EXAM CHEST 1 VIEW: CPT | Performed by: RADIOLOGY

## 2019-02-08 PROCEDURE — 85025 COMPLETE CBC W/AUTO DIFF WBC: CPT | Performed by: PHYSICIAN ASSISTANT

## 2019-02-08 PROCEDURE — 80053 COMPREHEN METABOLIC PANEL: CPT | Performed by: PHYSICIAN ASSISTANT

## 2019-02-08 PROCEDURE — P9612 CATHETERIZE FOR URINE SPEC: HCPCS

## 2019-02-08 PROCEDURE — 99283 EMERGENCY DEPT VISIT LOW MDM: CPT

## 2019-02-08 PROCEDURE — 83880 ASSAY OF NATRIURETIC PEPTIDE: CPT | Performed by: PHYSICIAN ASSISTANT

## 2019-02-08 PROCEDURE — 81001 URINALYSIS AUTO W/SCOPE: CPT | Performed by: PHYSICIAN ASSISTANT

## 2019-02-08 PROCEDURE — 36415 COLL VENOUS BLD VENIPUNCTURE: CPT

## 2019-02-08 PROCEDURE — 25010000002 LORAZEPAM PER 2 MG: Performed by: EMERGENCY MEDICINE

## 2019-02-08 PROCEDURE — 25010000002 FUROSEMIDE PER 20 MG: Performed by: PHYSICIAN ASSISTANT

## 2019-02-08 RX ORDER — LORAZEPAM 2 MG/ML
1 INJECTION INTRAMUSCULAR ONCE
Status: COMPLETED | OUTPATIENT
Start: 2019-02-08 | End: 2019-02-08

## 2019-02-08 RX ORDER — SODIUM CHLORIDE 0.9 % (FLUSH) 0.9 %
10 SYRINGE (ML) INJECTION AS NEEDED
Status: DISCONTINUED | OUTPATIENT
Start: 2019-02-08 | End: 2019-02-14 | Stop reason: HOSPADM

## 2019-02-08 RX ORDER — CARBOXYMETHYLCELLULOSE SODIUM 5 MG/ML
1 SOLUTION/ DROPS OPHTHALMIC 3 TIMES DAILY PRN
Status: DISCONTINUED | OUTPATIENT
Start: 2019-02-08 | End: 2019-02-14 | Stop reason: HOSPADM

## 2019-02-08 RX ORDER — IBUPROFEN 400 MG/1
400 TABLET ORAL EVERY 6 HOURS PRN
Status: CANCELLED | OUTPATIENT
Start: 2019-02-08

## 2019-02-08 RX ORDER — ONDANSETRON 2 MG/ML
4 INJECTION INTRAMUSCULAR; INTRAVENOUS EVERY 6 HOURS PRN
Status: DISCONTINUED | OUTPATIENT
Start: 2019-02-08 | End: 2019-02-14 | Stop reason: HOSPADM

## 2019-02-08 RX ORDER — NITROGLYCERIN 0.4 MG/1
0.4 TABLET SUBLINGUAL
Status: DISCONTINUED | OUTPATIENT
Start: 2019-02-08 | End: 2019-02-14 | Stop reason: HOSPADM

## 2019-02-08 RX ORDER — AMLODIPINE BESYLATE 5 MG/1
5 TABLET ORAL DAILY
COMMUNITY
End: 2019-04-02 | Stop reason: HOSPADM

## 2019-02-08 RX ORDER — QUETIAPINE FUMARATE 25 MG/1
50 TABLET, FILM COATED ORAL DAILY
Status: DISCONTINUED | OUTPATIENT
Start: 2019-02-09 | End: 2019-02-14 | Stop reason: HOSPADM

## 2019-02-08 RX ORDER — QUETIAPINE FUMARATE 100 MG/1
150 TABLET, FILM COATED ORAL NIGHTLY
COMMUNITY
End: 2019-03-28

## 2019-02-08 RX ORDER — CARBAMAZEPINE 200 MG/1
200 TABLET ORAL 2 TIMES DAILY
Status: DISCONTINUED | OUTPATIENT
Start: 2019-02-09 | End: 2019-02-14 | Stop reason: HOSPADM

## 2019-02-08 RX ORDER — ASPIRIN 81 MG/1
81 TABLET, CHEWABLE ORAL DAILY
COMMUNITY

## 2019-02-08 RX ORDER — SERTRALINE HYDROCHLORIDE 100 MG/1
100 TABLET, FILM COATED ORAL DAILY
COMMUNITY
End: 2019-03-28

## 2019-02-08 RX ORDER — MIRTAZAPINE 15 MG/1
15 TABLET, FILM COATED ORAL NIGHTLY
Status: DISCONTINUED | OUTPATIENT
Start: 2019-02-09 | End: 2019-02-14 | Stop reason: HOSPADM

## 2019-02-08 RX ORDER — DABIGATRAN ETEXILATE 150 MG/1
150 CAPSULE ORAL 2 TIMES DAILY
COMMUNITY
End: 2019-07-10 | Stop reason: HOSPADM

## 2019-02-08 RX ORDER — NICOTINE 21 MG/24HR
1 PATCH, TRANSDERMAL 24 HOURS TRANSDERMAL EVERY 24 HOURS
Status: DISCONTINUED | OUTPATIENT
Start: 2019-02-09 | End: 2019-02-14 | Stop reason: HOSPADM

## 2019-02-08 RX ORDER — ONDANSETRON 4 MG/1
4 TABLET, FILM COATED ORAL EVERY 6 HOURS PRN
Status: DISCONTINUED | OUTPATIENT
Start: 2019-02-08 | End: 2019-02-14 | Stop reason: HOSPADM

## 2019-02-08 RX ORDER — ASPIRIN 81 MG/1
81 TABLET, CHEWABLE ORAL DAILY
Status: DISCONTINUED | OUTPATIENT
Start: 2019-02-09 | End: 2019-02-14 | Stop reason: HOSPADM

## 2019-02-08 RX ORDER — MIRTAZAPINE 15 MG/1
15 TABLET, ORALLY DISINTEGRATING ORAL NIGHTLY
COMMUNITY
End: 2019-03-28

## 2019-02-08 RX ORDER — NICOTINE 21 MG/24HR
1 PATCH, TRANSDERMAL 24 HOURS TRANSDERMAL EVERY 24 HOURS
COMMUNITY
Start: 2019-02-15 | End: 2019-04-02 | Stop reason: HOSPADM

## 2019-02-08 RX ORDER — QUETIAPINE FUMARATE 50 MG/1
50 TABLET, FILM COATED ORAL EVERY MORNING
COMMUNITY
End: 2019-03-28

## 2019-02-08 RX ORDER — FUROSEMIDE 10 MG/ML
20 INJECTION INTRAMUSCULAR; INTRAVENOUS ONCE
Status: COMPLETED | OUTPATIENT
Start: 2019-02-08 | End: 2019-02-08

## 2019-02-08 RX ORDER — CARBAMAZEPINE 200 MG/1
200 TABLET, EXTENDED RELEASE ORAL 2 TIMES DAILY
Status: ON HOLD | COMMUNITY
End: 2019-02-08

## 2019-02-08 RX ORDER — SODIUM CHLORIDE 0.9 % (FLUSH) 0.9 %
3 SYRINGE (ML) INJECTION EVERY 12 HOURS SCHEDULED
Status: DISCONTINUED | OUTPATIENT
Start: 2019-02-08 | End: 2019-02-14 | Stop reason: HOSPADM

## 2019-02-08 RX ORDER — SODIUM CHLORIDE 0.9 % (FLUSH) 0.9 %
3-10 SYRINGE (ML) INJECTION AS NEEDED
Status: DISCONTINUED | OUTPATIENT
Start: 2019-02-08 | End: 2019-02-14 | Stop reason: HOSPADM

## 2019-02-08 RX ORDER — CARBAMAZEPINE 200 MG/1
200 TABLET ORAL EVERY 12 HOURS
COMMUNITY

## 2019-02-08 RX ORDER — ACETAMINOPHEN 325 MG/1
650 TABLET ORAL EVERY 4 HOURS PRN
Status: DISCONTINUED | OUTPATIENT
Start: 2019-02-08 | End: 2019-02-14 | Stop reason: HOSPADM

## 2019-02-08 RX ORDER — AMLODIPINE BESYLATE 5 MG/1
5 TABLET ORAL DAILY
Status: DISCONTINUED | OUTPATIENT
Start: 2019-02-09 | End: 2019-02-14 | Stop reason: HOSPADM

## 2019-02-08 RX ORDER — DABIGATRAN ETEXILATE 150 MG/1
150 CAPSULE ORAL 2 TIMES DAILY
Status: DISCONTINUED | OUTPATIENT
Start: 2019-02-09 | End: 2019-02-14 | Stop reason: HOSPADM

## 2019-02-08 RX ORDER — ONDANSETRON 4 MG/1
4 TABLET, ORALLY DISINTEGRATING ORAL EVERY 6 HOURS PRN
Status: DISCONTINUED | OUTPATIENT
Start: 2019-02-08 | End: 2019-02-14 | Stop reason: HOSPADM

## 2019-02-08 RX ORDER — IBUPROFEN 400 MG/1
400 TABLET ORAL EVERY 6 HOURS PRN
COMMUNITY
End: 2019-04-02 | Stop reason: HOSPADM

## 2019-02-08 RX ORDER — QUETIAPINE 150 MG/1
150 TABLET, FILM COATED, EXTENDED RELEASE ORAL NIGHTLY
Status: ON HOLD | COMMUNITY
End: 2019-02-08

## 2019-02-08 RX ADMIN — LORAZEPAM 1 MG: 2 INJECTION, SOLUTION INTRAMUSCULAR; INTRAVENOUS at 18:51

## 2019-02-08 RX ADMIN — SODIUM CHLORIDE 500 ML: 9 INJECTION, SOLUTION INTRAVENOUS at 18:02

## 2019-02-08 RX ADMIN — FUROSEMIDE 20 MG: 10 INJECTION, SOLUTION INTRAMUSCULAR; INTRAVENOUS at 18:15

## 2019-02-08 RX ADMIN — SODIUM CHLORIDE, PRESERVATIVE FREE 3 ML: 5 INJECTION INTRAVENOUS at 21:33

## 2019-02-08 NOTE — ED PROVIDER NOTES
Subjective     History provided by:  Patient   used: No    Altered Mental Status   Presenting symptoms: disorientation and memory loss    Presenting symptoms: no lethargy and no unresponsiveness    Severity:  Moderate  Episode history:  Continuous  Timing:  Constant  Progression:  Worsening  Chronicity:  Chronic  Context: dementia and not taking medications as prescribed    Context: not drug use, not head injury, not homeless, not nursing home resident, not recent illness and not recent infection    Associated symptoms: no abdominal pain, no difficulty breathing, no hallucinations and no weakness        Review of Systems   Unable to perform ROS: Dementia   Gastrointestinal: Negative for abdominal pain.   Neurological: Negative for weakness.   Psychiatric/Behavioral: Positive for memory loss. Negative for hallucinations.       Past Medical History:   Diagnosis Date   • Dementia    • Depression    • Hyperlipidemia    • Hypertension        No Known Allergies    Past Surgical History:   Procedure Laterality Date   • HYSTERECTOMY         History reviewed. No pertinent family history.    Social History     Socioeconomic History   • Marital status:      Spouse name: Not on file   • Number of children: Not on file   • Years of education: Not on file   • Highest education level: Not on file   Tobacco Use   • Smoking status: Current Every Day Smoker     Packs/day: 0.50   • Smokeless tobacco: Never Used   Substance and Sexual Activity   • Alcohol use: No     Frequency: Never     Comment: denies   • Drug use: No     Comment: denies   • Sexual activity: No           Objective   Physical Exam   Constitutional: She appears well-developed and well-nourished. No distress.   HENT:   Head: Normocephalic and atraumatic.   Right Ear: External ear normal.   Left Ear: External ear normal.   Nose: Nose normal.   Eyes: Conjunctivae and EOM are normal. Pupils are equal, round, and reactive to light.   Neck: Normal  range of motion. Neck supple. No JVD present. No tracheal deviation present.   Cardiovascular: Normal rate, regular rhythm and normal heart sounds.   No murmur heard.  Pulmonary/Chest: Effort normal and breath sounds normal. No respiratory distress. She has no wheezes.   Abdominal: Soft. Bowel sounds are normal. There is no tenderness.   Musculoskeletal: Normal range of motion. She exhibits no edema or deformity.   Neurological: She is alert. She has normal strength. She is disoriented. No cranial nerve deficit or sensory deficit. She displays a negative Romberg sign. GCS eye subscore is 4. GCS verbal subscore is 5. GCS motor subscore is 6.   A&O x 1   Skin: Skin is warm and dry. No rash noted. She is not diaphoretic. No erythema. No pallor.   Psychiatric: She has a normal mood and affect. Thought content normal. She is combative.   Nursing note and vitals reviewed.      Procedures           ED Course  ED Course as of Feb 08 2059 Fri Feb 08, 2019   1703 Per Dr. Ford, bibasilar effusions and minimal bibasilar airspace disease. XR Chest 1 View [TK]   1802 Son Devendra now present and states for the past 3 days patient won't eat, drink, or take her medications. States he has tried everything - mashed food up, placed in applesauce, jello, etc and states she will just spit it out.  [TK]   1845 Lead RNVannesa contacted house supervisor Saurabh and he is going to call on call  in regards to son of patient not being able to care for pt.  [TK]   1847 Pt becoming combative and trying to pull out IV. Will place order for Ativan. RN, Jeanine aware.  [TK]   1931 Still awaiting call from . Lead RN, Lizette ledesma.  [TK]   1947 Spoke with Olena Dunham .  [TK]   2011 Called RICKIE Michael in obs unit she will admit patient to her services.  [TK]      ED Course User Index  [TK] Yary Ross PA-C                  MDM  Number of Diagnoses or Management Options  Dehydration: new and requires  workup  Dementia with behavioral disturbance, unspecified dementia type: established and worsening  Failure to thrive in adult: new and requires workup  Pleural effusion, bilateral: new and requires workup     Amount and/or Complexity of Data Reviewed  Clinical lab tests: reviewed and ordered  Tests in the radiology section of CPT®: reviewed and ordered  Discuss the patient with other providers: yes (Fernanda Bill (obs unit))    Risk of Complications, Morbidity, and/or Mortality  Presenting problems: moderate  Diagnostic procedures: moderate  Management options: moderate    Patient Progress  Patient progress: stable        Final diagnoses:   Dehydration   Pleural effusion, bilateral   Dementia with behavioral disturbance, unspecified dementia type   Failure to thrive in adult            Yary Ross PA-C  02/08/19 2100

## 2019-02-09 ENCOUNTER — APPOINTMENT (OUTPATIENT)
Dept: CARDIOLOGY | Facility: HOSPITAL | Age: 84
End: 2019-02-09

## 2019-02-09 LAB
ALBUMIN SERPL-MCNC: 3.8 G/DL (ref 3.4–4.8)
ALBUMIN/GLOB SERPL: 1.4 G/DL (ref 1.5–2.5)
ALP SERPL-CCNC: 100 U/L (ref 35–104)
ALT SERPL W P-5'-P-CCNC: 54 U/L (ref 10–36)
ANION GAP SERPL CALCULATED.3IONS-SCNC: 10.8 MMOL/L (ref 3.6–11.2)
AST SERPL-CCNC: 50 U/L (ref 10–30)
BASOPHILS # BLD AUTO: 0.03 10*3/MM3 (ref 0–0.3)
BASOPHILS NFR BLD AUTO: 0.4 % (ref 0–2)
BILIRUB SERPL-MCNC: 0.5 MG/DL (ref 0.2–1.8)
BNP SERPL-MCNC: 815 PG/ML (ref 0–100)
BUN BLD-MCNC: 19 MG/DL (ref 7–21)
BUN/CREAT SERPL: 29.2 (ref 7–25)
CALCIUM SPEC-SCNC: 8.6 MG/DL (ref 7.7–10)
CHLORIDE SERPL-SCNC: 101 MMOL/L (ref 99–112)
CHOLEST SERPL-MCNC: 189 MG/DL (ref 0–200)
CO2 SERPL-SCNC: 28.2 MMOL/L (ref 24.3–31.9)
CREAT BLD-MCNC: 0.65 MG/DL (ref 0.43–1.29)
DEPRECATED RDW RBC AUTO: 43.8 FL (ref 37–54)
EOSINOPHIL # BLD AUTO: 0.18 10*3/MM3 (ref 0–0.7)
EOSINOPHIL NFR BLD AUTO: 2.1 % (ref 0–7)
ERYTHROCYTE [DISTWIDTH] IN BLOOD BY AUTOMATED COUNT: 13.7 % (ref 11.5–14.5)
GFR SERPL CREATININE-BSD FRML MDRD: 87 ML/MIN/1.73
GLOBULIN UR ELPH-MCNC: 2.7 GM/DL
GLUCOSE BLD-MCNC: 95 MG/DL (ref 70–110)
HCT VFR BLD AUTO: 35.5 % (ref 37–47)
HDLC SERPL-MCNC: 26 MG/DL (ref 60–100)
HGB BLD-MCNC: 11.5 G/DL (ref 12–16)
IMM GRANULOCYTES # BLD AUTO: 0.03 10*3/MM3 (ref 0–0.03)
IMM GRANULOCYTES NFR BLD AUTO: 0.4 % (ref 0–0.5)
LDLC SERPL CALC-MCNC: 131 MG/DL (ref 0–100)
LDLC/HDLC SERPL: 5.05 {RATIO}
LYMPHOCYTES # BLD AUTO: 0.8 10*3/MM3 (ref 1–3)
LYMPHOCYTES NFR BLD AUTO: 9.5 % (ref 16–46)
MCH RBC QN AUTO: 29 PG (ref 27–33)
MCHC RBC AUTO-ENTMCNC: 32.4 G/DL (ref 33–37)
MCV RBC AUTO: 89.4 FL (ref 80–94)
MONOCYTES # BLD AUTO: 0.88 10*3/MM3 (ref 0.1–0.9)
MONOCYTES NFR BLD AUTO: 10.5 % (ref 0–12)
NEUTROPHILS # BLD AUTO: 6.46 10*3/MM3 (ref 1.4–6.5)
NEUTROPHILS NFR BLD AUTO: 77.1 % (ref 40–75)
OSMOLALITY SERPL CALC.SUM OF ELEC: 281.5 MOSM/KG (ref 273–305)
PLATELET # BLD AUTO: 284 10*3/MM3 (ref 130–400)
PMV BLD AUTO: 9.8 FL (ref 6–10)
POTASSIUM BLD-SCNC: 3.5 MMOL/L (ref 3.5–5.3)
PROT SERPL-MCNC: 6.5 G/DL (ref 6–8)
RBC # BLD AUTO: 3.97 10*6/MM3 (ref 4.2–5.4)
SODIUM BLD-SCNC: 140 MMOL/L (ref 135–153)
TRIGL SERPL-MCNC: 159 MG/DL (ref 0–150)
TSH SERPL DL<=0.05 MIU/L-ACNC: 4.53 MIU/ML (ref 0.55–4.78)
VLDLC SERPL-MCNC: 31.8 MG/DL
WBC NRBC COR # BLD: 8.38 10*3/MM3 (ref 4.5–12.5)

## 2019-02-09 PROCEDURE — 25010000003 POTASSIUM CHLORIDE 10 MEQ/100ML SOLUTION

## 2019-02-09 PROCEDURE — 85025 COMPLETE CBC W/AUTO DIFF WBC: CPT | Performed by: PHYSICIAN ASSISTANT

## 2019-02-09 PROCEDURE — 80061 LIPID PANEL: CPT | Performed by: PHYSICIAN ASSISTANT

## 2019-02-09 PROCEDURE — 93306 TTE W/DOPPLER COMPLETE: CPT | Performed by: INTERNAL MEDICINE

## 2019-02-09 PROCEDURE — 25010000002 FUROSEMIDE PER 20 MG: Performed by: PHYSICIAN ASSISTANT

## 2019-02-09 PROCEDURE — 84443 ASSAY THYROID STIM HORMONE: CPT | Performed by: PHYSICIAN ASSISTANT

## 2019-02-09 PROCEDURE — 94799 UNLISTED PULMONARY SVC/PX: CPT

## 2019-02-09 PROCEDURE — 83880 ASSAY OF NATRIURETIC PEPTIDE: CPT | Performed by: PHYSICIAN ASSISTANT

## 2019-02-09 PROCEDURE — 93306 TTE W/DOPPLER COMPLETE: CPT

## 2019-02-09 PROCEDURE — 80053 COMPREHEN METABOLIC PANEL: CPT | Performed by: PHYSICIAN ASSISTANT

## 2019-02-09 RX ORDER — MAGNESIUM SULFATE HEPTAHYDRATE 40 MG/ML
2 INJECTION, SOLUTION INTRAVENOUS AS NEEDED
Status: DISCONTINUED | OUTPATIENT
Start: 2019-02-09 | End: 2019-02-14 | Stop reason: HOSPADM

## 2019-02-09 RX ORDER — FUROSEMIDE 10 MG/ML
20 INJECTION INTRAMUSCULAR; INTRAVENOUS ONCE
Status: COMPLETED | OUTPATIENT
Start: 2019-02-09 | End: 2019-02-09

## 2019-02-09 RX ORDER — POTASSIUM CHLORIDE 7.45 MG/ML
10 INJECTION INTRAVENOUS
Status: COMPLETED | OUTPATIENT
Start: 2019-02-09 | End: 2019-02-09

## 2019-02-09 RX ORDER — MAGNESIUM SULFATE HEPTAHYDRATE 40 MG/ML
4 INJECTION, SOLUTION INTRAVENOUS AS NEEDED
Status: DISCONTINUED | OUTPATIENT
Start: 2019-02-09 | End: 2019-02-14 | Stop reason: HOSPADM

## 2019-02-09 RX ORDER — POTASSIUM CHLORIDE 1.5 G/1.77G
40 POWDER, FOR SOLUTION ORAL AS NEEDED
Status: DISCONTINUED | OUTPATIENT
Start: 2019-02-09 | End: 2019-02-14 | Stop reason: HOSPADM

## 2019-02-09 RX ORDER — POTASSIUM CHLORIDE 1.5 G/1.77G
40 POWDER, FOR SOLUTION ORAL EVERY 4 HOURS
Status: DISPENSED | OUTPATIENT
Start: 2019-02-09 | End: 2019-02-09

## 2019-02-09 RX ORDER — POTASSIUM CHLORIDE 750 MG/1
40 CAPSULE, EXTENDED RELEASE ORAL AS NEEDED
Status: DISCONTINUED | OUTPATIENT
Start: 2019-02-09 | End: 2019-02-14 | Stop reason: HOSPADM

## 2019-02-09 RX ORDER — POTASSIUM CHLORIDE 7.45 MG/ML
10 INJECTION INTRAVENOUS
Status: DISCONTINUED | OUTPATIENT
Start: 2019-02-09 | End: 2019-02-14 | Stop reason: HOSPADM

## 2019-02-09 RX ADMIN — FUROSEMIDE 20 MG: 10 INJECTION, SOLUTION INTRAMUSCULAR; INTRAVENOUS at 11:42

## 2019-02-09 RX ADMIN — POTASSIUM CHLORIDE 10 MEQ: 10 INJECTION, SOLUTION INTRAVENOUS at 17:14

## 2019-02-09 RX ADMIN — QUETIAPINE FUMARATE 50 MG: 25 TABLET, FILM COATED ORAL at 09:35

## 2019-02-09 RX ADMIN — NICOTINE 1 PATCH: 14 PATCH, EXTENDED RELEASE TRANSDERMAL at 09:36

## 2019-02-09 RX ADMIN — CARBAMAZEPINE 200 MG: 200 TABLET ORAL at 09:34

## 2019-02-09 RX ADMIN — MIRTAZAPINE 15 MG: 15 TABLET, FILM COATED ORAL at 19:44

## 2019-02-09 RX ADMIN — DABIGATRAN ETEXILATE MESYLATE 150 MG: 150 CAPSULE ORAL at 09:34

## 2019-02-09 RX ADMIN — DABIGATRAN ETEXILATE MESYLATE 150 MG: 150 CAPSULE ORAL at 19:44

## 2019-02-09 RX ADMIN — AMLODIPINE BESYLATE 5 MG: 5 TABLET ORAL at 09:34

## 2019-02-09 RX ADMIN — QUETIAPINE FUMARATE 150 MG: 25 TABLET, FILM COATED ORAL at 19:44

## 2019-02-09 RX ADMIN — CARBAMAZEPINE 200 MG: 200 TABLET ORAL at 19:44

## 2019-02-09 RX ADMIN — SERTRALINE 100 MG: 50 TABLET, FILM COATED ORAL at 09:34

## 2019-02-09 RX ADMIN — POTASSIUM CHLORIDE 10 MEQ: 10 INJECTION, SOLUTION INTRAVENOUS at 15:02

## 2019-02-09 RX ADMIN — POTASSIUM CHLORIDE 10 MEQ: 10 INJECTION, SOLUTION INTRAVENOUS at 14:00

## 2019-02-09 RX ADMIN — SODIUM CHLORIDE, PRESERVATIVE FREE 3 ML: 5 INJECTION INTRAVENOUS at 09:36

## 2019-02-09 RX ADMIN — ASPIRIN 81 MG: 81 TABLET, CHEWABLE ORAL at 09:34

## 2019-02-09 RX ADMIN — POTASSIUM CHLORIDE 10 MEQ: 10 INJECTION, SOLUTION INTRAVENOUS at 16:11

## 2019-02-09 NOTE — H&P
HISTORY AND PHYSICAL        Patient Identification:  Name:  Mary Lou Graves  Age:  83 y.o.  Sex:  female  :  1935  MRN:  [id number]   Visit Number:  [id number]  Primary Care Physician:  Provider, No Known       Subjective     Subjective     Chief complaint:     Chief Complaint   Patient presents with   • Dementia     PATIENT WAS SEEN BY FAMILY PHYSICIAN THIS AM AND WOULD NOT GET OUT OF THE CAR. THE PLAN WAS TO ECVALUATE HER FOR NURSING HOME PLACEMENT DUE TO WORSENING DEMENTIA.       History of presenting illness:     The patient is an 83 year old female who presented to the ED on 19 with worsening disorientation and decrease PO intake. The patient seems to be suffering from failure to thrive. The patients son reported he is no longer able to take care of the patient due to her worsening condition.  were contacted and recommend admission to observation unit for further evaluation and possible nursing home placement. Workup in the ED was essentially negative other than worsening BNP at 815.0 and chest xray showing bibasilar effusions and minimal bibasilar airspace disease. Upon exam this morning she was fairly comfortable and pleasantly confused. Sitter at bedside. Denies any complaints but a very poor historian due to confusion.       ---------------------------------------------------------------------------------------------------------------------     Review Of Systems:    Unable to obtain due to the patients decreased mental status.   ---------------------------------------------------------------------------------------------------------------------     Past Medical History    Past Medical History:   Diagnosis Date   • Dementia    • Depression    • Hyperlipidemia    • Hypertension        Past Surgical History    Past Surgical History:   Procedure Laterality Date   • HYSTERECTOMY         Family History    History reviewed. No pertinent family history.    Social  History    Social History     Tobacco Use   • Smoking status: Current Every Day Smoker     Packs/day: 0.50   • Smokeless tobacco: Never Used   Substance Use Topics   • Alcohol use: No     Frequency: Never     Comment: denies   • Drug use: No     Comment: denies       Allergies    Patient has no known allergies.  ---------------------------------------------------------------------------------------------------------------------     Home Medications:    Prior to Admission Medications     Prescriptions Last Dose Informant Patient Reported? Taking?    amLODIPine (NORVASC) 5 MG tablet 2/7/2019 Child Yes Yes    Take 5 mg by mouth Daily.    aspirin 81 MG chewable tablet 2/7/2019 Child Yes Yes    Chew 81 mg Daily.    carBAMazepine (TEGretol) 200 MG tablet 2/7/2019 Child Yes Yes    Take 200 mg by mouth 2 (Two) Times a Day.    dabigatran etexilate (PRADAXA) 150 MG capsu 2/7/2019 Child Yes Yes    Take 150 mg by mouth 2 (Two) Times a Day.    ibuprofen (ADVIL,MOTRIN) 400 MG tablet 2/7/2019 Child Yes Yes    Take 400 mg by mouth Every 6 (Six) Hours As Needed for Mild Pain .    mirtazapine (REMERON SOL-TAB) 15 MG disintegrating tablet 2/7/2019 Child Yes Yes    Take 15 mg by mouth Every Night.    nicotine (NICODERM CQ) 14 MG/24HR patch 2/7/2019 Child Yes Yes    Place 1 patch on the skin as directed by provider Daily.    polyethyl glycol-propyl glycol (SYSTANE) 0.4-0.3 % solution ophthalmic solution 2/7/2019 Child Yes Yes    Administer 1 drop to both eyes 3 (Three) Times a Day.    QUEtiapine (SEROquel) 100 MG tablet 2/7/2019 Child Yes Yes    Take 150 mg by mouth Every Night.    QUEtiapine (SEROquel) 50 MG tablet 2/7/2019 Child Yes Yes    Take 50 mg by mouth Every Morning.    sertraline (ZOLOFT) 100 MG tablet 2/7/2019 Child Yes Yes    Take 100 mg by mouth Daily.        ---------------------------------------------------------------------------------------------------------------------    Objective     Objective     Hospital Scheduled  Meds:    amLODIPine 5 mg Oral Daily   aspirin 81 mg Oral Daily   carBAMazepine 200 mg Oral BID   dabigatran etexilate 150 mg Oral BID   mirtazapine 15 mg Oral Nightly   nicotine 1 patch Transdermal Q24H   potassium chloride 40 mEq Oral Q4H   QUEtiapine 150 mg Oral Nightly   QUEtiapine 50 mg Oral Daily   sertraline 100 mg Oral Daily   sodium chloride 3 mL Intravenous Q12H        ---------------------------------------------------------------------------------------------------------------------   Vital Signs:  Temp:  [97.7 °F (36.5 °C)-98.7 °F (37.1 °C)] 98.7 °F (37.1 °C)  Heart Rate:  [59-66] 66  Resp:  [18-20] 18  BP: (112-160)/(68-74) 160/69  Mean Arterial Pressure (Non-Invasive) for the past 24 hrs (Last 3 readings):   Noninvasive MAP (mmHg)   02/09/19 0800 94   02/09/19 0405 85   02/09/19 0000 110     SpO2 Percentage    02/09/19 0253 02/09/19 0405 02/09/19 0800   SpO2: 95% 93% 95%     SpO2:  [93 %-97 %] 95 %  on   ;   Device (Oxygen Therapy): room air    Body mass index is 20.3 kg/m².  Wt Readings from Last 3 Encounters:   02/08/19 55.3 kg (122 lb)   12/21/18 44 kg (97 lb)   12/17/18 44 kg (97 lb)     ---------------------------------------------------------------------------------------------------------------------     Physical Exam:    Constitutional:  Well-developed and well-nourished.  No respiratory distress. Calm. Confused.      HENT:  Head: Normocephalic and atraumatic.  Mouth:  Moist mucous membranes.    Eyes:  Conjunctivae and EOM are normal.  No scleral icterus.  Neck:  Neck supple.  No JVD present.    Cardiovascular:  Normal rate, regular rhythm and normal heart sounds with no murmur. No edema.  Pulmonary/Chest:  No respiratory distress, no wheezes, no crackles, with normal breath sounds and good air movement.  Abdominal:  Soft.  Bowel sounds are normal.  No distension and no tenderness.   Musculoskeletal:  No edema, no tenderness, and no deformity.  No swelling or redness of joints.  Neurological:   Disoriented.  No facial droop.  No slurred speech.   Skin:  Skin is warm and dry.  No rash noted.  No pallor.   Psychiatric:  Normal mood and affect.  Behavior is normal.    ---------------------------------------------------------------------------------------------------------------------  I have personally reviewed the EKG/Telemetry strip  ---------------------------------------------------------------------------------------------------------------------         Results from last 7 days   Lab Units 02/09/19 0038   CHOLESTEROL mg/dL 189   TRIGLYCERIDES mg/dL 159*   HDL CHOL mg/dL 26*   LDL CHOL mg/dL 131*       Results from last 7 days   Lab Units 02/09/19 0038 02/08/19  1640   WBC 10*3/mm3 8.38 9.81   HEMOGLOBIN g/dL 11.5* 10.8*   HEMATOCRIT % 35.5* 33.3*   MCV fL 89.4 89.0   MCHC g/dL 32.4* 32.4*   PLATELETS 10*3/mm3 284 251     Results from last 7 days   Lab Units 02/09/19 0038 02/08/19  1640   SODIUM mmol/L 140 137   POTASSIUM mmol/L 3.5 3.8   CHLORIDE mmol/L 101 105   CO2 mmol/L 28.2 23.9*   BUN mg/dL 19 22*   CREATININE mg/dL 0.65 0.66   EGFR IF NONAFRICN AM mL/min/1.73 87 86   CALCIUM mg/dL 8.6 8.6   GLUCOSE mg/dL 95 106   ALBUMIN g/dL 3.80 3.60   BILIRUBIN mg/dL 0.5 0.6   ALK PHOS U/L 100 96   AST (SGOT) U/L 50* 49*   ALT (SGPT) U/L 54* 56*   Estimated Creatinine Clearance: 48.1 mL/min (by C-G formula based on SCr of 0.65 mg/dL).  No results found for: AMMONIA    No results found for: HGBA1C, POCGLU  No results found for: HGBA1C  Lab Results   Component Value Date    TSH 4.532 02/09/2019                      Pain Management Panel     Pain Management Panel Latest Ref Rng & Units 2/8/2019 12/22/2018    AMPHETAMINES SCREEN, URINE Negative Negative Negative    BARBITURATES SCREEN Negative Negative Negative    BENZODIAZEPINE SCREEN, URINE Negative Negative Negative    BUPRENORPHINE Negative Negative Negative    COCAINE SCREEN, URINE Negative Negative Negative    METHADONE SCREEN, URINE Negative Negative  Negative        I have personally reviewed the above laboratory results.   ---------------------------------------------------------------------------------------------------------------------  Imaging Results (last 7 days)     Procedure Component Value Units Date/Time    XR Chest 1 View [[id number]] Collected:  02/08/19 1616     Updated:  02/08/19 1620    Narrative:       XR CHEST 1 VW-     CLINICAL INDICATION: disoriented          COMPARISON: 12/22/2018      TECHNIQUE: Single frontal view of the chest.     FINDINGS:     Bibasilar effusions and minimal bibasilar airspace disease  The cardiac silhouette is normal. The pulmonary vasculature is  unremarkable.  There is no evidence of an acute osseous abnormality.   There are no suspicious-appearing parenchymal soft tissue nodules.            Impression:       Bibasilar effusions and minimal bibasilar airspace disease         This report was finalized on 2/8/2019 4:18 PM by Dr. Yanick Ford MD.           I have personally reviewed the above radiology results.   ---------------------------------------------------------------------------------------------------------------------      Assessment & Plan        Assessment/Plan       ASSESSMENT:    1. Failure to thrive  2. Social issues  3. CHF exacerbation    PLAN:    The patient is an 83 year old female who presented to the ED on 2/9/19 with worsening disorientation and decrease PO intake. The patient seems to be suffering from failure to thrive. The patients son reported he is no longer able to take care of the patient due to her worsening condition.  were contacted and recommend admission to observation unit for further evaluation and possible nursing home placement. Workup in the ED was essentially negative other than worsening BNP at 815.0 and chest xray showing bibasilar effusions and minimal bibasilar airspace disease. Upon exam this morning she was fairly comfortable and pleasantly confused. Sitter at  bedside. Denies any complaints but a very poor historian due to confusion.     Echo ordered. Lasix 20mg IV x once given.    Patient evaluated with her nurse Nisha.     Sitter at bedside.     SCDs for DVT prophylaxis.      working on possible nursing home placement. Will make inpatient as the patient is going to require a longer stay.     Patient's findings and recommendations were discussed with patient and nursing staff      Code Status:   Code Status and Medical Interventions:   Ordered at: 02/08/19 2058     Code Status:    CPR     Medical Interventions (Level of Support Prior to Arrest):    Full           Fernanda Al PA-C  02/09/19  10:32 AM

## 2019-02-09 NOTE — DISCHARGE PLACEMENT REQUEST
"Mary Lou Graves (83 y.o. Female)     Date of Birth Social Security Number Address Home Phone MRN    1935  [address]  [place] [id number] [phone] [id number]    Gnosticist Marital Status          None        Admission Date Admission Type Admitting Provider Attending Provider Department, Room/Bed    2/8/19 Emergency Nicole Valdes MD [place], Wajdi Samir, MD [facility] OBSERVATION UNIT, 209/2S    Discharge Date Discharge Disposition Discharge Destination                       Attending Provider:  Nicole Valdes MD    Allergies:  No Known Allergies    Isolation:  None   Infection:  None   Code Status:  CPR    Ht:  165.1 cm (65\")   Wt:  55.3 kg (122 lb)    Admission Cmt:  None   Principal Problem:  None                Active Insurance as of 2/8/2019     Primary Coverage     Payor Plan Insurance Group Employer/Plan Group    MEDICARE MEDICARE A & B      Payor Plan Address Payor Plan Phone Number Payor Plan Fax Number Effective Dates    [address] [phone]  1/1/1989 - None Entered    [place] [address]       Subscriber Name Subscriber Birth Date Member ID       MARY LOU GRAVES 1935 [id number]                 Emergency Contacts      (Rel.) Home Phone Work Phone Mobile Phone    LEONIDES GRAVES (Son) [phone] -- [phone]    RACHAEL GRAVES (Spouse) [phone] -- [phone]            Emergency Contact Information     Name Relation Home Work Mobile    LEONIDES GRAVES Son [phone]    RACHAEL GRAVES Spouse [phone]          Insurance Information                MEDICARE/MEDICARE A & B Phone: [phone]    Subscriber: Mary Lou Graves Subscriber#: [id number]    Group#:  Precert#:           "

## 2019-02-09 NOTE — PROGRESS NOTES
Nutrition Services    Patient Name:  Mary Lou Graves  YOB: 1935  MRN: [id number]  Admit Date:  2/8/2019    Diet order - Consistent Carbohydrate / Cardiac / Renal diet.   Intake poor and advanced age - Diet order changed per protocol to Regular.   Will start supplements with meals.     Electronically signed by:  Katharina Keller RD  02/09/19 11:57 AM

## 2019-02-09 NOTE — PLAN OF CARE
Problem: Fall Risk (Adult)  Goal: Identify Related Risk Factors and Signs and Symptoms  Outcome: Ongoing (interventions implemented as appropriate)    Goal: Absence of Fall  Outcome: Ongoing (interventions implemented as appropriate)      Problem: Skin Injury Risk (Adult)  Goal: Identify Related Risk Factors and Signs and Symptoms  Outcome: Ongoing (interventions implemented as appropriate)    Goal: Skin Health and Integrity  Outcome: Ongoing (interventions implemented as appropriate)      Problem: Cognitive Impairment (IRF) (Adult)  Goal: Optimal/Safe Level of [place] Related to Cognitive Impairment  Outcome: Ongoing (interventions implemented as appropriate)    Goal: Optimal Quality of Life in Relation to Cognitive Impairments  Outcome: Ongoing (interventions implemented as appropriate)      Problem: Cardiac Output Decreased (Adult)  Goal: Identify Related Risk Factors and Signs and Symptoms  Outcome: Ongoing (interventions implemented as appropriate)    Goal: Effective Tissue Perfusion  Outcome: Ongoing (interventions implemented as appropriate)      Problem: Urinary Tract Infection (Adult)  Goal: Signs and Symptoms of Listed Potential Problems Will be Absent, Minimized or Managed (Urinary Tract Infection)  Outcome: Ongoing (interventions implemented as appropriate)

## 2019-02-09 NOTE — PLAN OF CARE
Problem: Fall Risk (Adult)  Goal: Identify Related Risk Factors and Signs and Symptoms  Outcome: Ongoing (interventions implemented as appropriate)    Goal: Absence of Fall  Outcome: Ongoing (interventions implemented as appropriate)      Problem: Skin Injury Risk (Adult)  Goal: Identify Related Risk Factors and Signs and Symptoms  Outcome: Ongoing (interventions implemented as appropriate)    Goal: Skin Health and Integrity  Outcome: Ongoing (interventions implemented as appropriate)      Problem: Cognitive Impairment (IRF) (Adult)  Goal: Optimal/Safe Level of [place] Related to Cognitive Impairment  Outcome: Ongoing (interventions implemented as appropriate)    Goal: Optimal Quality of Life in Relation to Cognitive Impairments  Outcome: Ongoing (interventions implemented as appropriate)      Problem: Cardiac Output Decreased (Adult)  Goal: Identify Related Risk Factors and Signs and Symptoms  Outcome: Ongoing (interventions implemented as appropriate)    Goal: Effective Tissue Perfusion  Outcome: Ongoing (interventions implemented as appropriate)      Problem: Urinary Tract Infection (Adult)  Goal: Signs and Symptoms of Listed Potential Problems Will be Absent, Minimized or Managed (Urinary Tract Infection)  Outcome: Ongoing (interventions implemented as appropriate)      Problem: Patient Care Overview  Goal: Plan of Care Review  Outcome: Ongoing (interventions implemented as appropriate)    Goal: Individualization and Mutuality  Outcome: Ongoing (interventions implemented as appropriate)    Goal: Discharge Needs Assessment  Outcome: Ongoing (interventions implemented as appropriate)    Goal: Interprofessional Rounds/Family Conf  Outcome: Ongoing (interventions implemented as appropriate)

## 2019-02-09 NOTE — PROGRESS NOTES
Discharge Planning Assessment   [facility]     Patient Name: Mary Lou Graves  MRN: [id number]  Today's Date: 2/9/2019    Admit Date: 2/8/2019    Discharge Needs Assessment     Row Name 02/09/19 1004       Living Environment    Lives With  child(carmen), adult    Current Living Arrangements  home/apartment/condo    Primary Care Provided by  child(carmen)    Family Caregiver if Needed  child(carmen), adult    Quality of Family Relationships  helpful;involved;supportive    Able to Return to Prior Arrangements  no Pt's son states that they are unable to take pt home at discharge, due to confusion. Pt will need nursing home placement.        Transition Planning    Patient/Family Anticipates Transition to  long term care facility    Patient/Family Anticipated Services at Transition      Transportation Anticipated  family or friend will provide       Discharge Needs Assessment    Equipment Currently Used at Home  wheelchair    Equipment Needed After Discharge  none        Discharge Plan     Row Name 02/09/19 1005       Plan    Plan  SS called on call from the ED and spoke with pt's son. Pt has been living at home with her son for the past few months. Pt has Medicare and has not had a three night inpatient stay for Medicare to pay for nursing home placement. SS educated pt's son on this. Pt's son states that money is not an issue. Pt's son states that he is unable to care for the pt any longer at home. SS asked if he would be agreeable for pt to return home and SS work on placement from home. Pt's son states he is unable to care for the pt at home. Pt does have [facility]. Pt does not have a POA or living will at this time. Pt utilizes [facility] with no issues. Pt's PCP office is [facility]. Pt's son informed SS that the pt had an appointment on 2/8/19 with her PCP. However, pt was unable to get out of the car. They called for the ambulance, ambulance brought the pt to the ED. SS has contacted the  local nursing homes, no one available on this date to review pt's information for placement. SS faxed pt's information on this date. SS will follow up on Monday with the local nursing homes.    Patient/Family in Agreement with Plan  yes        Demographic Summary     Row Name 02/09/19 1003       General Information    Admission Type  inpatient    Arrived From  home    Referral Source  nursing    Reason for Consult  discharge planning    Preferred Language  English     Used During This Interaction  no            Olena Oneil

## 2019-02-10 LAB
BH CV ECHO MEAS - ACS: 1.9 CM
BH CV ECHO MEAS - AO MAX PG: 6 MMHG
BH CV ECHO MEAS - AO MEAN PG: 3 MMHG
BH CV ECHO MEAS - AO ROOT AREA (BSA CORRECTED): 1.9
BH CV ECHO MEAS - AO ROOT AREA: 7.7 CM^2
BH CV ECHO MEAS - AO ROOT DIAM: 3.1 CM
BH CV ECHO MEAS - AO V2 MAX: 122.2 CM/SEC
BH CV ECHO MEAS - AO V2 MEAN: 82.2 CM/SEC
BH CV ECHO MEAS - AO V2 VTI: 23.4 CM
BH CV ECHO MEAS - BSA(HAYCOCK): 1.6 M^2
BH CV ECHO MEAS - BSA: 1.6 M^2
BH CV ECHO MEAS - BZI_BMI: 20.3 KILOGRAMS/M^2
BH CV ECHO MEAS - BZI_METRIC_HEIGHT: 165.1 CM
BH CV ECHO MEAS - BZI_METRIC_WEIGHT: 55.3 KG
BH CV ECHO MEAS - EDV(CUBED): 93.8 ML
BH CV ECHO MEAS - EDV(MOD-SP4): 52 ML
BH CV ECHO MEAS - EDV(TEICH): 94.6 ML
BH CV ECHO MEAS - EF(CUBED): 65.3 %
BH CV ECHO MEAS - EF(MOD-SP4): 59.6 %
BH CV ECHO MEAS - EF(TEICH): 56.9 %
BH CV ECHO MEAS - ESV(CUBED): 32.6 ML
BH CV ECHO MEAS - ESV(MOD-SP4): 21 ML
BH CV ECHO MEAS - ESV(TEICH): 40.8 ML
BH CV ECHO MEAS - FS: 29.7 %
BH CV ECHO MEAS - IVS/LVPW: 1.1
BH CV ECHO MEAS - IVSD: 1.3 CM
BH CV ECHO MEAS - LA DIMENSION: 3.5 CM
BH CV ECHO MEAS - LA/AO: 1.1
BH CV ECHO MEAS - LV DIASTOLIC VOL/BSA (35-75): 32.4 ML/M^2
BH CV ECHO MEAS - LV MASS(C)D: 215.2 GRAMS
BH CV ECHO MEAS - LV MASS(C)DI: 134.2 GRAMS/M^2
BH CV ECHO MEAS - LV SYSTOLIC VOL/BSA (12-30): 13.1 ML/M^2
BH CV ECHO MEAS - LVIDD: 4.5 CM
BH CV ECHO MEAS - LVIDS: 3.2 CM
BH CV ECHO MEAS - LVLD AP4: 6.4 CM
BH CV ECHO MEAS - LVLS AP4: 5.4 CM
BH CV ECHO MEAS - LVOT AREA (M): 3.5 CM^2
BH CV ECHO MEAS - LVOT AREA: 3.5 CM^2
BH CV ECHO MEAS - LVOT DIAM: 2.1 CM
BH CV ECHO MEAS - LVPWD: 1.2 CM
BH CV ECHO MEAS - MV A MAX VEL: 37 CM/SEC
BH CV ECHO MEAS - MV E MAX VEL: 66.6 CM/SEC
BH CV ECHO MEAS - MV E/A: 1.8
BH CV ECHO MEAS - PA ACC SLOPE: 1454 CM/SEC^2
BH CV ECHO MEAS - PA ACC TIME: 0.08 SEC
BH CV ECHO MEAS - PA PR(ACCEL): 44.1 MMHG
BH CV ECHO MEAS - RAP SYSTOLE: 10 MMHG
BH CV ECHO MEAS - RVSP: 29.1 MMHG
BH CV ECHO MEAS - SI(AO): 111.9 ML/M^2
BH CV ECHO MEAS - SI(CUBED): 38.2 ML/M^2
BH CV ECHO MEAS - SI(MOD-SP4): 19.3 ML/M^2
BH CV ECHO MEAS - SI(TEICH): 33.6 ML/M^2
BH CV ECHO MEAS - SV(AO): 179.4 ML
BH CV ECHO MEAS - SV(CUBED): 61.3 ML
BH CV ECHO MEAS - SV(MOD-SP4): 31 ML
BH CV ECHO MEAS - SV(TEICH): 53.8 ML
BH CV ECHO MEAS - TR MAX VEL: 218.7 CM/SEC
MAXIMAL PREDICTED HEART RATE: 139 BPM
POTASSIUM BLD-SCNC: 4.8 MMOL/L (ref 3.5–5.3)
STRESS TARGET HR: 118 BPM

## 2019-02-10 PROCEDURE — 84132 ASSAY OF SERUM POTASSIUM: CPT | Performed by: INTERNAL MEDICINE

## 2019-02-10 RX ADMIN — CARBAMAZEPINE 200 MG: 200 TABLET ORAL at 08:18

## 2019-02-10 RX ADMIN — SERTRALINE 100 MG: 50 TABLET, FILM COATED ORAL at 08:18

## 2019-02-10 RX ADMIN — QUETIAPINE FUMARATE 50 MG: 25 TABLET, FILM COATED ORAL at 08:18

## 2019-02-10 RX ADMIN — QUETIAPINE FUMARATE 150 MG: 25 TABLET, FILM COATED ORAL at 21:42

## 2019-02-10 RX ADMIN — MIRTAZAPINE 15 MG: 15 TABLET, FILM COATED ORAL at 21:47

## 2019-02-10 RX ADMIN — DABIGATRAN ETEXILATE MESYLATE 150 MG: 150 CAPSULE ORAL at 21:43

## 2019-02-10 RX ADMIN — CARBAMAZEPINE 200 MG: 200 TABLET ORAL at 21:46

## 2019-02-10 RX ADMIN — ASPIRIN 81 MG: 81 TABLET, CHEWABLE ORAL at 08:18

## 2019-02-10 RX ADMIN — AMLODIPINE BESYLATE 5 MG: 5 TABLET ORAL at 08:18

## 2019-02-10 RX ADMIN — NICOTINE 1 PATCH: 14 PATCH, EXTENDED RELEASE TRANSDERMAL at 08:18

## 2019-02-10 NOTE — PROGRESS NOTES
PROGRESS NOTE         Patient Identification:  Name:  Mary Luo Graves  Age:  83 y.o.  Sex:  female  :  1935  MRN:  [id number]  Visit Number:  [id number]  Primary Care Provider:  London Merritt MD      ----------------------------------------------------------------------------------------------------------------------  Subjective       Chief Complaints:    Dementia (PATIENT WAS SEEN BY FAMILY PHYSICIAN THIS AM AND WOULD NOT GET OUT OF THE CAR. THE PLAN WAS TO ECVALUATE HER FOR NURSING HOME PLACEMENT DUE TO WORSENING DEMENTIA.)        Interval History:      The patient was resting comfortably this morning. She continues to be confused but calm and at baseline. She states she does not feel well but does not want to talk about it right now. Does not appear to be in any distress. Sitter at bedside during exam. Vital signs stable and remains afebrile.     Review of Systems:    Unable to obtain due to the patients confusion  ----------------------------------------------------------------------------------------------------------------------      Objective       Current Hospital Meds:    amLODIPine 5 mg Oral Daily   aspirin 81 mg Oral Daily   carBAMazepine 200 mg Oral BID   dabigatran etexilate 150 mg Oral BID   mirtazapine 15 mg Oral Nightly   nicotine 1 patch Transdermal Q24H   QUEtiapine 150 mg Oral Nightly   QUEtiapine 50 mg Oral Daily   sertraline 100 mg Oral Daily   sodium chloride 3 mL Intravenous Q12H        ----------------------------------------------------------------------------------------------------------------------    Vital Signs:  Temp:  [97.1 °F (36.2 °C)-98.4 °F (36.9 °C)] 97.8 °F (36.6 °C)  Heart Rate:  [59-81] 70  Resp:  [16-20] 20  BP: (126-157)/(52-79) 126/72  Mean Arterial Pressure (Non-Invasive) for the past 24 hrs (Last 3 readings):   Noninvasive MAP (mmHg)   19 1657 99   19 1546 103     SpO2 Percentage    19 1846 02/10/19 0302 02/10/19 0612   SpO2: 97%  90% 98%     SpO2:  [90 %-98 %] 98 %  on   ;   Device (Oxygen Therapy): room air    Body mass index is 20.3 kg/m².  Wt Readings from Last 3 Encounters:   02/08/19 55.3 kg (122 lb)   12/21/18 44 kg (97 lb)   12/17/18 44 kg (97 lb)        Intake/Output Summary (Last 24 hours) at 2/10/2019 1056  Last data filed at 2/9/2019 1814  Gross per 24 hour   Intake 640 ml   Output --   Net 640 ml     Diet Regular  ----------------------------------------------------------------------------------------------------------------------    Physical Exam:     Constitutional:  Well-developed and well-nourished.  No respiratory distress. Calm. Confused.      HENT:  Head: Normocephalic and atraumatic.  Mouth:  Moist mucous membranes.    Eyes:  Conjunctivae and EOM are normal.  No scleral icterus.  Neck:  Neck supple.  No JVD present.    Cardiovascular:  Normal rate, regular rhythm and normal heart sounds with no murmur. No edema.  Pulmonary/Chest:  No respiratory distress, no wheezes, no crackles, with normal breath sounds and good air movement.  Abdominal:  Soft.  Bowel sounds are normal.  No distension and no tenderness.   Musculoskeletal:  No edema, no tenderness, and no deformity.  No swelling or redness of joints.  Neurological:  Disoriented.  No facial droop.  No slurred speech.   Skin:  Skin is warm and dry.  No rash noted.  No pallor.   Psychiatric:  Normal mood and affect.  Behavior is normal.    ----------------------------------------------------------------------------------------------------------------------  I have personally reviewed the EKG/Telemetry strips   ----------------------------------------------------------------------------------------------------------------------        Results from last 7 days   Lab Units 02/09/19 0038   CHOLESTEROL mg/dL 189   TRIGLYCERIDES mg/dL 159*   HDL CHOL mg/dL 26*   LDL CHOL mg/dL 131*       Results from last 7 days   Lab Units 02/09/19 0038 02/08/19  1640   WBC 10*3/mm3 8.38 9.81    HEMOGLOBIN g/dL 11.5* 10.8*   HEMATOCRIT % 35.5* 33.3*   MCV fL 89.4 89.0   MCHC g/dL 32.4* 32.4*   PLATELETS 10*3/mm3 284 251     Results from last 7 days   Lab Units 02/10/19  0400 02/09/19  0038 02/08/19  1640   SODIUM mmol/L  --  140 137   POTASSIUM mmol/L 4.8 3.5 3.8   CHLORIDE mmol/L  --  101 105   CO2 mmol/L  --  28.2 23.9*   BUN mg/dL  --  19 22*   CREATININE mg/dL  --  0.65 0.66   EGFR IF NONAFRICN AM mL/min/1.73  --  87 86   CALCIUM mg/dL  --  8.6 8.6   GLUCOSE mg/dL  --  95 106   ALBUMIN g/dL  --  3.80 3.60   BILIRUBIN mg/dL  --  0.5 0.6   ALK PHOS U/L  --  100 96   AST (SGOT) U/L  --  50* 49*   ALT (SGPT) U/L  --  54* 56*   Estimated Creatinine Clearance: 48.1 mL/min (by C-G formula based on SCr of 0.65 mg/dL).  No results found for: AMMONIA    No results found for: HGBA1C, POCGLU  No results found for: HGBA1C  Lab Results   Component Value Date    TSH 4.532 02/09/2019     Pain Management Panel     Pain Management Panel Latest Ref Rng & Units 2/8/2019 12/22/2018    AMPHETAMINES SCREEN, URINE Negative Negative Negative    BARBITURATES SCREEN Negative Negative Negative    BENZODIAZEPINE SCREEN, URINE Negative Negative Negative    BUPRENORPHINE Negative Negative Negative    COCAINE SCREEN, URINE Negative Negative Negative    METHADONE SCREEN, URINE Negative Negative Negative          I have personally reviewed the above laboratory results.   ----------------------------------------------------------------------------------------------------------------------  Imaging Results (last 24 hours)     ** No results found for the last 24 hours. **        I have personally reviewed the above radiology results.   ----------------------------------------------------------------------------------------------------------------------    Assessment/Plan       Assessment/Plan     ASSESSMENT:      1. Failure to thrive  2. Social issues  3. CHF exacerbation     PLAN:     The patient is an 83 year old female who presented  to the ED on 2/9/19 with worsening disorientation and decrease PO intake. The patient seems to be suffering from failure to thrive. The patients son reported he is no longer able to take care of the patient due to her worsening condition.  were contacted and recommend admission to observation unit for further evaluation and possible nursing home placement. Workup in the ED was essentially negative other than worsening BNP at 815.0 and chest xray showing bibasilar effusions and minimal bibasilar airspace disease. Upon exam this morning she was fairly comfortable and pleasantly confused. Sitter at bedside. Denies any complaints but a very poor historian due to confusion.      Lasix 20mg IV x once given on 2/9/19. No shortness of breath today and lung exam seems to be improved. Echo shows an EF of 61-65%.     The patient was resting comfortably this morning. She continues to be confused but calm and at baseline. She states she does not feel well but does not want to talk about it right now. Does not appear to be in any distress. Sitter at bedside during exam. Vital signs stable and remains afebrile.      SCDs for DVT prophylaxis.      Discharge planning in place per case management awaiting nursing home placement.    CBC, CRP, CMP. BNP, repeat chest xray ordered for am.       Code Status:   Code Status and Medical Interventions:   Ordered at: 02/08/19 2058     Code Status:    CPR     Medical Interventions (Level of Support Prior to Arrest):    Full       Fernanda Al PA-C  02/10/19  10:56 AM

## 2019-02-10 NOTE — PHARMACY PATIENT ASSISTANCE
Pharmacy checked on pricing of Pradaxa for patient, which is one of her home medications. The patient last picked up Pradaxa on 01/08/19 for $350.67. The next time Pradaxa is filled it will have a copay of $142.44. A portion of this copay will be applied to the deductible on patient's plan. Pharmacy will reassess at discharge if patient will need assistance.    Thank you,    Phyllis Zavala DORON  02/10/19  12:18 PM

## 2019-02-11 ENCOUNTER — APPOINTMENT (OUTPATIENT)
Dept: GENERAL RADIOLOGY | Facility: HOSPITAL | Age: 84
End: 2019-02-11

## 2019-02-11 LAB
ALBUMIN SERPL-MCNC: 3.7 G/DL (ref 3.4–4.8)
ALBUMIN/GLOB SERPL: 1.4 G/DL (ref 1.5–2.5)
ALP SERPL-CCNC: 96 U/L (ref 35–104)
ALT SERPL W P-5'-P-CCNC: 54 U/L (ref 10–36)
ANION GAP SERPL CALCULATED.3IONS-SCNC: 9.3 MMOL/L (ref 3.6–11.2)
AST SERPL-CCNC: 40 U/L (ref 10–30)
BASOPHILS # BLD AUTO: 0.03 10*3/MM3 (ref 0–0.3)
BASOPHILS NFR BLD AUTO: 0.3 % (ref 0–2)
BILIRUB SERPL-MCNC: 0.4 MG/DL (ref 0.2–1.8)
BNP SERPL-MCNC: 973 PG/ML (ref 0–100)
BUN BLD-MCNC: 25 MG/DL (ref 7–21)
BUN/CREAT SERPL: 37.3 (ref 7–25)
CALCIUM SPEC-SCNC: 9 MG/DL (ref 7.7–10)
CHLORIDE SERPL-SCNC: 101 MMOL/L (ref 99–112)
CO2 SERPL-SCNC: 30.7 MMOL/L (ref 24.3–31.9)
CREAT BLD-MCNC: 0.67 MG/DL (ref 0.43–1.29)
CRP SERPL-MCNC: 2.57 MG/DL (ref 0–0.99)
DEPRECATED RDW RBC AUTO: 43.4 FL (ref 37–54)
EOSINOPHIL # BLD AUTO: 0.08 10*3/MM3 (ref 0–0.7)
EOSINOPHIL NFR BLD AUTO: 0.8 % (ref 0–7)
ERYTHROCYTE [DISTWIDTH] IN BLOOD BY AUTOMATED COUNT: 13.6 % (ref 11.5–14.5)
GFR SERPL CREATININE-BSD FRML MDRD: 84 ML/MIN/1.73
GLOBULIN UR ELPH-MCNC: 2.7 GM/DL
GLUCOSE BLD-MCNC: 112 MG/DL (ref 70–110)
HCT VFR BLD AUTO: 36.9 % (ref 37–47)
HGB BLD-MCNC: 12 G/DL (ref 12–16)
IMM GRANULOCYTES # BLD AUTO: 0.03 10*3/MM3 (ref 0–0.03)
IMM GRANULOCYTES NFR BLD AUTO: 0.3 % (ref 0–0.5)
LYMPHOCYTES # BLD AUTO: 0.49 10*3/MM3 (ref 1–3)
LYMPHOCYTES NFR BLD AUTO: 4.7 % (ref 16–46)
MCH RBC QN AUTO: 29 PG (ref 27–33)
MCHC RBC AUTO-ENTMCNC: 32.5 G/DL (ref 33–37)
MCV RBC AUTO: 89.1 FL (ref 80–94)
MONOCYTES # BLD AUTO: 0.96 10*3/MM3 (ref 0.1–0.9)
MONOCYTES NFR BLD AUTO: 9.2 % (ref 0–12)
NEUTROPHILS # BLD AUTO: 8.79 10*3/MM3 (ref 1.4–6.5)
NEUTROPHILS NFR BLD AUTO: 84.7 % (ref 40–75)
OSMOLALITY SERPL CALC.SUM OF ELEC: 286.4 MOSM/KG (ref 273–305)
PLATELET # BLD AUTO: 329 10*3/MM3 (ref 130–400)
PMV BLD AUTO: 9.2 FL (ref 6–10)
POTASSIUM BLD-SCNC: 3.7 MMOL/L (ref 3.5–5.3)
PROT SERPL-MCNC: 6.4 G/DL (ref 6–8)
RBC # BLD AUTO: 4.14 10*6/MM3 (ref 4.2–5.4)
SODIUM BLD-SCNC: 141 MMOL/L (ref 135–153)
WBC NRBC COR # BLD: 10.38 10*3/MM3 (ref 4.5–12.5)

## 2019-02-11 PROCEDURE — 86140 C-REACTIVE PROTEIN: CPT | Performed by: PHYSICIAN ASSISTANT

## 2019-02-11 PROCEDURE — C1751 CATH, INF, PER/CENT/MIDLINE: HCPCS

## 2019-02-11 PROCEDURE — 97163 PT EVAL HIGH COMPLEX 45 MIN: CPT

## 2019-02-11 PROCEDURE — 71045 X-RAY EXAM CHEST 1 VIEW: CPT

## 2019-02-11 PROCEDURE — 97530 THERAPEUTIC ACTIVITIES: CPT

## 2019-02-11 PROCEDURE — 85025 COMPLETE CBC W/AUTO DIFF WBC: CPT | Performed by: PHYSICIAN ASSISTANT

## 2019-02-11 PROCEDURE — 71045 X-RAY EXAM CHEST 1 VIEW: CPT | Performed by: RADIOLOGY

## 2019-02-11 PROCEDURE — 36410 VNPNXR 3YR/> PHY/QHP DX/THER: CPT

## 2019-02-11 PROCEDURE — 83880 ASSAY OF NATRIURETIC PEPTIDE: CPT | Performed by: PHYSICIAN ASSISTANT

## 2019-02-11 PROCEDURE — 80053 COMPREHEN METABOLIC PANEL: CPT | Performed by: PHYSICIAN ASSISTANT

## 2019-02-11 RX ORDER — SODIUM CHLORIDE 0.9 % (FLUSH) 0.9 %
10 SYRINGE (ML) INJECTION EVERY 12 HOURS SCHEDULED
Status: DISCONTINUED | OUTPATIENT
Start: 2019-02-11 | End: 2019-02-14 | Stop reason: HOSPADM

## 2019-02-11 RX ORDER — SODIUM CHLORIDE 0.9 % (FLUSH) 0.9 %
10 SYRINGE (ML) INJECTION AS NEEDED
Status: DISCONTINUED | OUTPATIENT
Start: 2019-02-11 | End: 2019-02-14 | Stop reason: HOSPADM

## 2019-02-11 RX ADMIN — DABIGATRAN ETEXILATE MESYLATE 150 MG: 150 CAPSULE ORAL at 21:36

## 2019-02-11 RX ADMIN — QUETIAPINE FUMARATE 150 MG: 25 TABLET, FILM COATED ORAL at 21:35

## 2019-02-11 RX ADMIN — CARBAMAZEPINE 200 MG: 200 TABLET ORAL at 10:00

## 2019-02-11 RX ADMIN — CARBAMAZEPINE 200 MG: 200 TABLET ORAL at 21:36

## 2019-02-11 RX ADMIN — NICOTINE 1 PATCH: 14 PATCH, EXTENDED RELEASE TRANSDERMAL at 10:09

## 2019-02-11 RX ADMIN — MIRTAZAPINE 15 MG: 15 TABLET, FILM COATED ORAL at 21:36

## 2019-02-11 RX ADMIN — ASPIRIN 81 MG: 81 TABLET, CHEWABLE ORAL at 10:00

## 2019-02-11 RX ADMIN — SERTRALINE 100 MG: 50 TABLET, FILM COATED ORAL at 10:00

## 2019-02-11 RX ADMIN — QUETIAPINE FUMARATE 50 MG: 25 TABLET, FILM COATED ORAL at 10:00

## 2019-02-11 RX ADMIN — SODIUM CHLORIDE, PRESERVATIVE FREE 10 ML: 5 INJECTION INTRAVENOUS at 10:10

## 2019-02-11 RX ADMIN — DABIGATRAN ETEXILATE MESYLATE 150 MG: 150 CAPSULE ORAL at 10:00

## 2019-02-11 RX ADMIN — SODIUM CHLORIDE, PRESERVATIVE FREE 3 ML: 5 INJECTION INTRAVENOUS at 10:02

## 2019-02-11 RX ADMIN — AMLODIPINE BESYLATE 5 MG: 5 TABLET ORAL at 10:00

## 2019-02-11 NOTE — PROGRESS NOTES
PROGRESS NOTE         Patient Identification:  Name:  Mary Lou Graves  Age:  83 y.o.  Sex:  female  :  1935  MRN:  [id number]  Visit Number:  [id number]  Primary Care Provider:  London Merritt MD      ----------------------------------------------------------------------------------------------------------------------  Subjective       Chief Complaints:    Dementia (PATIENT WAS SEEN BY FAMILY PHYSICIAN THIS AM AND WOULD NOT GET OUT OF THE CAR. THE PLAN WAS TO ECVALUATE HER FOR NURSING HOME PLACEMENT DUE TO WORSENING DEMENTIA.)         Interval History:      The patient appeared comfortable this morning with sitter at bedside. She reports she did not eat much of her breakfast this morning. Vital signs are stable and she remains afebrile. The patients nurse, Irena, reports some urinary retention and had to straight cath the patient with 500 cc out. Will monitor for further retention. BNP is elevated at 973 from 815. Does not appear to be in respiratory distress. Normal white count with CRP slightly elevated at 2.57. Awaiting nursing home placement.     Review of Systems:    Unable to obtain due to the patients confusion  ----------------------------------------------------------------------------------------------------------------------      Objective       Current Hospital Meds:    amLODIPine 5 mg Oral Daily   aspirin 81 mg Oral Daily   carBAMazepine 200 mg Oral BID   dabigatran etexilate 150 mg Oral BID   mirtazapine 15 mg Oral Nightly   nicotine 1 patch Transdermal Q24H   QUEtiapine 150 mg Oral Nightly   QUEtiapine 50 mg Oral Daily   sertraline 100 mg Oral Daily   sodium chloride 10 mL Intravenous Q12H   sodium chloride 3 mL Intravenous Q12H        ----------------------------------------------------------------------------------------------------------------------    Vital Signs:  Temp:  [97.2 °F (36.2 °C)-98.5 °F (36.9 °C)] 97.2 °F (36.2 °C)  Heart Rate:  [70-73] 73  Resp:  [18-20] 18  BP:  (124-163)/(62-72) 124/71  Mean Arterial Pressure (Non-Invasive) for the past 24 hrs (Last 3 readings):   Noninvasive MAP (mmHg)   02/11/19 0638 95     SpO2 Percentage    02/10/19 1833 02/11/19 0240 02/11/19 0638   SpO2: 96% 95% 95%     SpO2:  [95 %-97 %] 95 %  on   ;   Device (Oxygen Therapy): room air    Body mass index is 20.3 kg/m².  Wt Readings from Last 3 Encounters:   02/08/19 55.3 kg (122 lb)   12/21/18 44 kg (97 lb)   12/17/18 44 kg (97 lb)      No intake or output data in the 24 hours ending 02/11/19 0938  Diet Regular  ----------------------------------------------------------------------------------------------------------------------    Physical Exam:     Constitutional:  Well-developed and well-nourished.  No respiratory distress. Calm. Confused.      HENT:  Head: Normocephalic and atraumatic.  Mouth:  Moist mucous membranes.    Eyes:  Conjunctivae and EOM are normal.  No scleral icterus.  Neck:  Neck supple.  No JVD present.    Cardiovascular:  Normal rate, regular rhythm and normal heart sounds with no murmur. No edema.  Pulmonary/Chest:  No respiratory distress, no wheezes, no crackles, with normal breath sounds and good air movement.  Abdominal:  Soft.  Bowel sounds are normal.  No distension and no tenderness.   Musculoskeletal:  No edema, no tenderness, and no deformity.  No swelling or redness of joints.  Neurological:  Disoriented.  No facial droop.  No slurred speech.   Skin:  Skin is warm and dry.  No rash noted.  No pallor.   Psychiatric:  Normal mood and affect.  Behavior is normal.    ----------------------------------------------------------------------------------------------------------------------  I have personally reviewed the EKG/Telemetry strips   ----------------------------------------------------------------------------------------------------------------------        Results from last 7 days   Lab Units 02/09/19  0038   CHOLESTEROL mg/dL 189   TRIGLYCERIDES mg/dL 159*   HDL CHOL  mg/dL 26*   LDL CHOL mg/dL 131*       Results from last 7 days   Lab Units 02/11/19  0437 02/09/19  0038 02/08/19  1640   CRP mg/dL 2.57*  --   --    WBC 10*3/mm3 10.38 8.38 9.81   HEMOGLOBIN g/dL 12.0 11.5* 10.8*   HEMATOCRIT % 36.9* 35.5* 33.3*   MCV fL 89.1 89.4 89.0   MCHC g/dL 32.5* 32.4* 32.4*   PLATELETS 10*3/mm3 329 284 251     Results from last 7 days   Lab Units 02/11/19  0437 02/10/19  0400 02/09/19  0038 02/08/19  1640   SODIUM mmol/L 141  --  140 137   POTASSIUM mmol/L 3.7 4.8 3.5 3.8   CHLORIDE mmol/L 101  --  101 105   CO2 mmol/L 30.7  --  28.2 23.9*   BUN mg/dL 25*  --  19 22*   CREATININE mg/dL 0.67  --  0.65 0.66   EGFR IF NONAFRICN AM mL/min/1.73 84  --  87 86   CALCIUM mg/dL 9.0  --  8.6 8.6   GLUCOSE mg/dL 112*  --  95 106   ALBUMIN g/dL 3.70  --  3.80 3.60   BILIRUBIN mg/dL 0.4  --  0.5 0.6   ALK PHOS U/L 96  --  100 96   AST (SGOT) U/L 40*  --  50* 49*   ALT (SGPT) U/L 54*  --  54* 56*   Estimated Creatinine Clearance: 48.1 mL/min (by C-G formula based on SCr of 0.67 mg/dL).  No results found for: AMMONIA    No results found for: HGBA1C, POCGLU  No results found for: HGBA1C  Lab Results   Component Value Date    TSH 4.532 02/09/2019     Pain Management Panel     Pain Management Panel Latest Ref Rng & Units 2/8/2019 12/22/2018    AMPHETAMINES SCREEN, URINE Negative Negative Negative    BARBITURATES SCREEN Negative Negative Negative    BENZODIAZEPINE SCREEN, URINE Negative Negative Negative    BUPRENORPHINE Negative Negative Negative    COCAINE SCREEN, URINE Negative Negative Negative    METHADONE SCREEN, URINE Negative Negative Negative          I have personally reviewed the above laboratory results.   ----------------------------------------------------------------------------------------------------------------------  Imaging Results (last 24 hours)     Procedure Component Value Units Date/Time    XR Chest 1 View [[id number]] Collected:  02/11/19 0823     Updated:  02/11/19 0825    Narrative:        EXAMINATION: XR CHEST 1 VW-      CLINICAL INDICATION:     shortness of breath; R62.7-Adult failure to  thrive; E86.0-Dehydration; H43-Tjjmrtp effusion, not elsewhere  classified; F03.91-Unspecified dementia with behavioral disturbance     TECHNIQUE:  XR CHEST 1 VW-      COMPARISON: 2/8/2019      FINDINGS:   Prior CABG. Left pacer device noted. Interstitial edema has improved.  Vascular congestion improved. Bilateral opacities improved with improved  aeration noted. No effusion or pneumothorax. Chest otherwise stable.       Impression:       Improvement in CHF/edema and bilateral airspace disease.     This report was finalized on 2/11/2019 8:23 AM by Dr. Derik Arzola MD.           I have personally reviewed the above radiology results.   ----------------------------------------------------------------------------------------------------------------------    Assessment/Plan       Assessment/Plan     ASSESSMENT:      1. Failure to thrive  2. Social issues  3. CHF exacerbation     PLAN:    The patient appeared comfortable this morning with sitter at bedside. She reports she did not eat much of her breakfast this morning. Vital signs are stable and she remains afebrile. The patients nurse, Irena, reports some urinary retention and had to straight cath the patient with 500 cc out. Will monitor for further retention. BNP is elevated at 973 from 815. Does not appear to be in respiratory distress. Repeat chest xray shows improvement of CHF/edema and bilateral airspace disease. Echo shows an EF of 61-65%. Normal white count with CRP slightly elevated at 2.57. Awaiting nursing home placement.       SCDs for DVT prophylaxis.      Discharge planning in place per case management awaiting nursing home placement.    CBC, CRP, CMP ordered for am.       Code Status:   Code Status and Medical Interventions:   Ordered at: 02/08/19 2058     Code Status:    CPR     Medical Interventions (Level of Support Prior to Arrest):    Full        Fernanda Al PA-C  02/11/19  9:38 AM    Physician Attestation:    I have personally seen and examined the patient. I reviewed the patient's data including history of present illness, review of systems, physical examination, assessment and treatment plan and agree with findings above. The assessment and plan are my own.  I have reviewed and edited the note above after discussing the findings with the midlevel.    Nicole Valdes MD  Infectious Diseases  02/11/19  6:53 PM

## 2019-02-11 NOTE — THERAPY EVALUATION
Acute Care - Physical Therapy Initial Evaluation/Treatment Note   [facility]     Patient Name: Mary Lou Graves  : 1935  MRN: [id number]  Today's Date: 2019   Onset of Illness/Injury or Date of Surgery: 19(admit date)  Date of Referral to PT: 19  Referring Physician: Servando      Admit Date: 2019    Visit Dx:     ICD-10-CM ICD-9-CM   1. Failure to thrive in adult R62.7 783.7   2. Dehydration E86.0 276.51   3. Pleural effusion, bilateral J90 511.9   4. Dementia with behavioral disturbance, unspecified dementia type F03.91 294.21     Patient Active Problem List   Diagnosis   • Acute UTI   • Failure to thrive in adult     Past Medical History:   Diagnosis Date   • Dementia    • Depression    • Hyperlipidemia    • Hypertension      Past Surgical History:   Procedure Laterality Date   • HYSTERECTOMY          PT ASSESSMENT (last 12 hours)      Physical Therapy Evaluation     Row Name 19 1048          PT Evaluation Time/Intention    Subjective Information  -- pt does not accurately verbalize complaints  -CT     Document Type  evaluation;therapy note (daily note)  -CT     Mode of Treatment  individual therapy;physical therapy  -CT     Patient Effort  poor  -CT     Comment  Pt tolerated evaluation and treatment session poor with rest breaks provided as needed. Pt wtih dementia and is unable to follow directions or actively participate. Will attempt to follow pt for therapy, however may need to discharge soon d/t unable to participate.   -CT     Row Name 19 1048          General Information    Patient Profile Reviewed?  yes  -CT     Onset of Illness/Injury or Date of Surgery  19 admit date  -CT     Referring Physician  Servando  -CT     Patient Observations  alert;cooperative;agree to therapy  -CT     Prior Level of Function  mod assist:;max assist: pt unable to provided, however family having difficulty  -CT     Equipment Currently Used at Home  -- unable to provide  -CT      Existing Precautions/Restrictions  fall  -CT     Limitations/Impairments  safety/cognitive  -CT     Equipment Ordered for Patient  gait belt  -CT     Risks Reviewed  patient:;LOB;nausea/vomiting;dizziness;increased discomfort;change in vital signs;increased drainage;lines disloged  -CT     Benefits Reviewed  patient:;improve function;increase independence;increase strength;increase balance;decrease pain;decrease risk of DVT;improve skin integrity;increase knowledge  -CT     Barriers to Rehab  cognitive status;previous functional deficit  -CT     Row Name 02/11/19 1048          Relationship/Environment    Lives With  child(carmen), adult per chart  -CT     Row Name 02/11/19 1048          Resource/Environmental Concerns    Current Living Arrangements  home/apartment/condo per chart  -CT     Row Name 02/11/19 1048          Cognitive Assessment/Intervention- PT/OT    Orientation Status (Cognition)  person;verbal cues/prompts needed for orientation first name only with heavy verbal cueing  -CT     Follows Commands (Cognition)  does not follow one step commands  -CT     Safety Deficit (Cognitive)  ability to follow commands;awareness of need for assistance;at risk behavior observed;severe deficit  -CT     Row Name 02/11/19 1048          Safety Issues, Functional Mobility    Impairments Affecting Function (Mobility)  cognition;balance;strength  -CT     Row Name 02/11/19 1048          Bed Mobility Assessment/Treatment    Bed Mobility Assessment/Treatment  bed mobility (all) activities  -CT     [place] Level (Bed Mobility)  moderate assist (50% patient effort);2 person assist;verbal cues;nonverbal cues (demo/gesture)  -CT     Bed Mobility, Safety Issues  cognitive deficits limit understanding  -CT     Row Name 02/11/19 1048          Transfer Assessment/Treatment    Transfer Assessment/Treatment  sit-stand transfer;stand-sit transfer  -CT     Sit-Stand [place] (Transfers)  moderate assist (50% patient effort);maximum  assist (25% patient effort);2 person assist;verbal cues;nonverbal cues (demo/gesture)  -CT     Stand-Sit [place] (Transfers)  moderate assist (50% patient effort);maximum assist (25% patient effort);2 person assist;verbal cues;nonverbal cues (demo/gesture)  -CT     Row Name 02/11/19 1048          Gait/Stairs Assessment/Training    Comment (Gait/Stairs)  pt would not ambulate with physical therapy, however nursing reports that pt ambulated to restroom with 2 person assist  -CT     Row Name 02/11/19 1048          General ROM    GENERAL ROM COMMENTS  unable to follow commands for formal testing, however appears functional with movements  -CT     Row Name 02/11/19 1048          MMT (Manual Muscle Testing)    General MMT Comments  unable to follow commands for formal testing  -CT     Row Name 02/11/19 1048          Plan of Care Review    Plan of Care Reviewed With  -- nursing  -CT     Row Name 02/11/19 1048          Physical Therapy Clinical Impression    Date of Referral to PT  02/09/19  -CT     PT Diagnosis (PT Clinical Impression)  impaired functional mobility  -CT     Prognosis (PT Clinical Impression)  fair  -CT     Functional Level at Time of Evaluation (PT Clinical Impression)  Mod/Max A   -CT     Patient/Family Goals Statement (PT Clinical Impression)  Pt does not verbalize goals, family goals are to admit pt to nursing home  -CT     Criteria for Skilled Interventions Met (PT Clinical Impression)  yes;treatment indicated  -CT     Pathology/Pathophysiology Noted (Describe Specifically for Each System)  musculoskeletal;neuromuscular  -CT     Impairments Found (describe specific impairments)  arousal, attention, and cognition;aerobic capacity/endurance;gait, locomotion, and balance  -CT     Functional Limitations in Following Categories (Describe Specific Limitations)  self-care;home management  -CT     Rehab Potential (PT Clinical Summary)  fair, will monitor progress closely  -CT     Predicted Duration of  Therapy (PT)  based on progress and ability to participate  -CT     Care Plan Review (PT)  evaluation/treatment results reviewed;care plan/treatment goals reviewed;risks/benefits reviewed;current/potential barriers reviewed;patient/other agree to care plan  -CT     Row Name 02/11/19 1048          Physical Therapy Goals    Bed Mobility Goal Selection (PT)  bed mobility, PT goal 1  -CT     Transfer Goal Selection (PT)  transfer, PT goal 1  -CT     Gait Training Goal Selection (PT)  gait training, PT goal 1  -CT     Row Name 02/11/19 1048          Bed Mobility Goal 1 (PT)    Activity/Assistive Device (Bed Mobility Goal 1, PT)  bed mobility activities, all  -CT     [place] Level/Cues Needed (Bed Mobility Goal 1, PT)  minimum assist (75% or more patient effort)  -CT     Time Frame (Bed Mobility Goal 1, PT)  by discharge  -CT     Row Name 02/11/19 1048          Transfer Goal 1 (PT)    Activity/Assistive Device (Transfer Goal 1, PT)  sit-to-stand/stand-to-sit;bed-to-chair/chair-to-bed  -CT     [place] Level/Cues Needed (Transfer Goal 1, PT)  minimum assist (75% or more patient effort)  -CT     Time Frame (Transfer Goal 1, PT)  by discharge  -CT     Row Name 02/11/19 1048          Gait Training Goal 1 (PT)    Activity/Assistive Device (Gait Training Goal 1, PT)  gait (walking locomotion);assistive device use  -CT     [place] Level (Gait Training Goal 1, PT)  moderate assist (50-74% patient effort)  -CT     Time Frame (Gait Training Goal 1, PT)  by discharge  -CT     Row Name 02/11/19 1048          Positioning and Restraints    Pre-Treatment Position  in bed  -CT     Post Treatment Position  bed  -CT     In Bed  supine;call light within reach;encouraged to call for assist;exit alarm on;with family/caregiver;side rails up x3;notified nsg with sitter in room  -CT       User Key  (r) = Recorded By, (t) = Taken By, (c) = Cosigned By    Initials Name Provider Type    Anu Vences, PT Physical Therapist         Physical Therapy Education     Title: PT OT SLP Therapies (In Progress)     Topic: Physical Therapy (In Progress)     Point: Mobility training (In Progress)     Learning Progress Summary           Patient Acceptance, E,TB, NL by CT at 2/11/2019 11:03 AM                   Point: Home exercise program (In Progress)     Learning Progress Summary           Patient Acceptance, E,TB, NL by CT at 2/11/2019 11:03 AM                   Point: Body mechanics (In Progress)     Learning Progress Summary           Patient Acceptance, E,TB, NL by CT at 2/11/2019 11:03 AM                   Point: Precautions (In Progress)     Learning Progress Summary           Patient Acceptance, E,TB, NL by CT at 2/11/2019 11:03 AM                               User Key     Initials Effective Dates Name Provider Type Discipline    CT 04/03/18 -  Anu Cabrera, PT Physical Therapist PT              PT Recommendation and Plan  Anticipated Discharge Disposition (PT): skilled nursing facility  Planned Therapy Interventions (PT Eval): balance training, bed mobility training, gait training, manual therapy techniques, patient/family education, postural re-education, strengthening, transfer training  Therapy Frequency (PT Clinical Impression): 3 times/wk(3-5 times/wk as available)  Outcome Summary/Treatment Plan (PT)  Anticipated Equipment Needs at Discharge (PT): (tbd)  Anticipated Discharge Disposition (PT): skilled nursing facility  Plan of Care Reviewed With: (nursing)  Outcome Measures     Row Name 02/11/19 1100             How much help from another person do you currently need...    Turning from your back to your side while in flat bed without using bedrails?  2  -CT      Moving from lying on back to sitting on the side of a flat bed without bedrails?  2  -CT      Moving to and from a bed to a chair (including a wheelchair)?  2  -CT      Standing up from a chair using your arms (e.g., wheelchair, bedside chair)?  2  -CT      Climbing 3-5  steps with a railing?  1  -CT      To walk in hospital room?  2  -CT      AM-PAC 6 Clicks Score  11  -CT         Functional Assessment    Outcome Measure Options  AM-PAC 6 Clicks Basic Mobility (PT)  -CT        User Key  (r) = Recorded By, (t) = Taken By, (c) = Cosigned By    Initials Name Provider Type    CT Anu Cabrera, PT Physical Therapist         Time Calculation:   PT Charges     Row Name 02/11/19 1103             Time Calculation    PT Received On  02/11/19  -CT      PT - Next Appointment  02/12/19  -CT      PT Goal Re-Cert Due Date  02/25/19  -CT         Time Calculation- PT    Total Timed Code Minutes- PT  53 minute(s)  -CT         Timed Charges    51250 - PT Therapeutic Activity Minutes  25 -CT        User Key  (r) = Recorded By, (t) = Taken By, (c) = Cosigned By    Initials Name Provider Type    CT Anu Cabrera, PT Physical Therapist          Therapy Charges for Today     Code Description Service Date Service Provider Modifiers Qty    02230719717 HC PT EVAL HIGH COMPLEXITY 2 2/11/2019 Anu Cabrera, PT GP 1    23800389522 HC PT THERAPEUTIC ACT EA 15 MIN 2/11/2019 Anu Cabrera, PT GP 2    38409854457 HC PT THER SUPP EA 15 MIN 2/11/2019 Anu Cabrera, PT GP 4          PT G-Codes  Outcome Measure Options: AM-PAC 6 Clicks Basic Mobility (PT)  AM-PAC 6 Clicks Score: 11      Anu Cabrera PT  2/11/2019

## 2019-02-11 NOTE — SIGNIFICANT NOTE
02/11/19 1701   Rehab Time/Intention   Evaluation Not Performed other (see comments)  (Pt unable to functionally participate with OT on this date, attempt in AM & PM. OT to attempt evaluation again trmw. )   Rehab Treatment   Discipline occupational therapist   Recommendation   OT - Next Appointment 02/12/19

## 2019-02-11 NOTE — PLAN OF CARE
Problem: Fall Risk (Adult)  Goal: Identify Related Risk Factors and Signs and Symptoms  Outcome: Ongoing (interventions implemented as appropriate)    Goal: Absence of Fall  Outcome: Ongoing (interventions implemented as appropriate)      Problem: Skin Injury Risk (Adult)  Goal: Identify Related Risk Factors and Signs and Symptoms  Outcome: Ongoing (interventions implemented as appropriate)    Goal: Skin Health and Integrity  Outcome: Ongoing (interventions implemented as appropriate)      Problem: Cognitive Impairment (IRF) (Adult)  Goal: Optimal/Safe Level of [place] Related to Cognitive Impairment  Outcome: Ongoing (interventions implemented as appropriate)    Goal: Optimal Quality of Life in Relation to Cognitive Impairments  Outcome: Ongoing (interventions implemented as appropriate)      Problem: Cardiac Output Decreased (Adult)  Goal: Identify Related Risk Factors and Signs and Symptoms  Outcome: Ongoing (interventions implemented as appropriate)    Goal: Effective Tissue Perfusion  Outcome: Ongoing (interventions implemented as appropriate)      Problem: Urinary Tract Infection (Adult)  Goal: Signs and Symptoms of Listed Potential Problems Will be Absent, Minimized or Managed (Urinary Tract Infection)  Outcome: Ongoing (interventions implemented as appropriate)      Problem: Patient Care Overview  Goal: Plan of Care Review  Outcome: Ongoing (interventions implemented as appropriate)    Goal: Individualization and Mutuality  Outcome: Ongoing (interventions implemented as appropriate)    Goal: Discharge Needs Assessment  Outcome: Ongoing (interventions implemented as appropriate)    Goal: Interprofessional Rounds/Family Conf  Outcome: Ongoing (interventions implemented as appropriate)

## 2019-02-11 NOTE — PROGRESS NOTES
Discharge Planning Assessment  [facility]     Patient Name: Mary Lou Graves  MRN: [id number]  Today's Date: 2/11/2019    Admit Date: 2/8/2019      Discharge Plan     Row Name 02/11/19 0837       Plan    Plan  SS [facility] with [facility], who states they do not have a female bed available at this time. SS spoke with [facility]; they stated they will contact SS back when they get a census together. SS spoke with [facility], per Maribeth. Maribeth states they do not have any female beds available. SS spoke with [facility] on this date per Sophia. Sophia states they have received the request and will look at the referrals. SS will follow.    SS spoke with [facility], who states they are unable to accept pt. SS spoke with [facility] per Sophia request for information regarding possible discharge.     Patient/Family in Agreement with Plan  yes        Destination      Service Provider Request Status Selected Services Address Phone Number Fax Number    THE [facility] Pending - Request Sent N/A [address] [facility] [address] [phone]    [facility] Pending - Request Sent N/A [address] [place] [id number] [phone]    [facility] Pending - Request Sent N/A [address] [address] [phone]    [facility] Pending - Request Sent N/A [address] [facility] [address] [phone]    [facility] Pending - Request Sent N/A [address] [facility] [address] [phone]    [facility] Pending - Request Sent N/A [address] [facility] [address] [phone]     Olena Oneil

## 2019-02-11 NOTE — PLAN OF CARE
Problem: Fall Risk (Adult)  Goal: Identify Related Risk Factors and Signs and Symptoms  Outcome: Ongoing (interventions implemented as appropriate)      Problem: Skin Injury Risk (Adult)  Goal: Identify Related Risk Factors and Signs and Symptoms  Outcome: Ongoing (interventions implemented as appropriate)      Problem: Urinary Tract Infection (Adult)  Goal: Signs and Symptoms of Listed Potential Problems Will be Absent, Minimized or Managed (Urinary Tract Infection)  Outcome: Ongoing (interventions implemented as appropriate)      Problem: Patient Care Overview  Goal: Plan of Care Review  Outcome: Ongoing (interventions implemented as appropriate)      Problem: Mobility, Physical Impaired (Adult)  Goal: Identify Related Risk Factors and Signs and Symptoms  Outcome: Ongoing (interventions implemented as appropriate)

## 2019-02-11 NOTE — PROGRESS NOTES
Discharge Planning Assessment  [facility]     Patient Name: Mary Lou Graves  MRN: [id number]  Today's Date: 2/11/2019    Admit Date: 2/8/2019      Discharge Plan     Row Name 02/11/19 1544       Plan    Plan  SS spoke with [facility] per Sophia who stated she would notify SS if a bed was available.  [facility] evaluating pt.  SS will follow.         Destination      Service Provider Request Status Selected Services Address Phone Number Fax Number    THE [facility] Pending - Request Sent N/A [address] , [facility] [address] [phone]    [facility] - Request Sent N/A [address] [facility] [address] [phone]    [facility] Pending - Request Sent N/A [address] [address] [phone]    [facility] - Request Sent N/A [address] [facility] [address] [phone]    [facility] P[facility] - Request Sent N/A [address] [facility] [address] [phone]    [facility] Pending - Request Sent N/A [address] [facility] [address] [phone]          Holly Goyal

## 2019-02-12 LAB
ALBUMIN SERPL-MCNC: 3.5 G/DL (ref 3.4–4.8)
ALBUMIN/GLOB SERPL: 1.3 G/DL (ref 1.5–2.5)
ALP SERPL-CCNC: 93 U/L (ref 35–104)
ALT SERPL W P-5'-P-CCNC: 37 U/L (ref 10–36)
ANION GAP SERPL CALCULATED.3IONS-SCNC: 6.6 MMOL/L (ref 3.6–11.2)
AST SERPL-CCNC: 25 U/L (ref 10–30)
BASOPHILS # BLD AUTO: 0.03 10*3/MM3 (ref 0–0.3)
BASOPHILS NFR BLD AUTO: 0.2 % (ref 0–2)
BILIRUB SERPL-MCNC: 0.3 MG/DL (ref 0.2–1.8)
BUN BLD-MCNC: 27 MG/DL (ref 7–21)
BUN/CREAT SERPL: 33.3 (ref 7–25)
CALCIUM SPEC-SCNC: 8.9 MG/DL (ref 7.7–10)
CHLORIDE SERPL-SCNC: 101 MMOL/L (ref 99–112)
CO2 SERPL-SCNC: 28.4 MMOL/L (ref 24.3–31.9)
CREAT BLD-MCNC: 0.81 MG/DL (ref 0.43–1.29)
CRP SERPL-MCNC: 2.08 MG/DL (ref 0–0.99)
DEPRECATED RDW RBC AUTO: 44.2 FL (ref 37–54)
EOSINOPHIL # BLD AUTO: 0.08 10*3/MM3 (ref 0–0.7)
EOSINOPHIL NFR BLD AUTO: 0.6 % (ref 0–7)
ERYTHROCYTE [DISTWIDTH] IN BLOOD BY AUTOMATED COUNT: 13.8 % (ref 11.5–14.5)
GFR SERPL CREATININE-BSD FRML MDRD: 68 ML/MIN/1.73
GLOBULIN UR ELPH-MCNC: 2.8 GM/DL
GLUCOSE BLD-MCNC: 141 MG/DL (ref 70–110)
HCT VFR BLD AUTO: 35.4 % (ref 37–47)
HGB BLD-MCNC: 11.3 G/DL (ref 12–16)
IMM GRANULOCYTES # BLD AUTO: 0.04 10*3/MM3 (ref 0–0.03)
IMM GRANULOCYTES NFR BLD AUTO: 0.3 % (ref 0–0.5)
LYMPHOCYTES # BLD AUTO: 0.8 10*3/MM3 (ref 1–3)
LYMPHOCYTES NFR BLD AUTO: 6.4 % (ref 16–46)
MCH RBC QN AUTO: 28.5 PG (ref 27–33)
MCHC RBC AUTO-ENTMCNC: 31.9 G/DL (ref 33–37)
MCV RBC AUTO: 89.2 FL (ref 80–94)
MONOCYTES # BLD AUTO: 1.06 10*3/MM3 (ref 0.1–0.9)
MONOCYTES NFR BLD AUTO: 8.5 % (ref 0–12)
NEUTROPHILS # BLD AUTO: 10.47 10*3/MM3 (ref 1.4–6.5)
NEUTROPHILS NFR BLD AUTO: 84 % (ref 40–75)
OSMOLALITY SERPL CALC.SUM OF ELEC: 279.4 MOSM/KG (ref 273–305)
PLATELET # BLD AUTO: 336 10*3/MM3 (ref 130–400)
PMV BLD AUTO: 10.3 FL (ref 6–10)
POTASSIUM BLD-SCNC: 3.5 MMOL/L (ref 3.5–5.3)
PROT SERPL-MCNC: 6.3 G/DL (ref 6–8)
RBC # BLD AUTO: 3.97 10*6/MM3 (ref 4.2–5.4)
SODIUM BLD-SCNC: 136 MMOL/L (ref 135–153)
WBC NRBC COR # BLD: 12.48 10*3/MM3 (ref 4.5–12.5)

## 2019-02-12 PROCEDURE — 97116 GAIT TRAINING THERAPY: CPT

## 2019-02-12 PROCEDURE — 85025 COMPLETE CBC W/AUTO DIFF WBC: CPT | Performed by: PHYSICIAN ASSISTANT

## 2019-02-12 PROCEDURE — 86140 C-REACTIVE PROTEIN: CPT | Performed by: PHYSICIAN ASSISTANT

## 2019-02-12 PROCEDURE — 80053 COMPREHEN METABOLIC PANEL: CPT | Performed by: PHYSICIAN ASSISTANT

## 2019-02-12 PROCEDURE — 97530 THERAPEUTIC ACTIVITIES: CPT

## 2019-02-12 PROCEDURE — 97167 OT EVAL HIGH COMPLEX 60 MIN: CPT | Performed by: OCCUPATIONAL THERAPIST

## 2019-02-12 RX ORDER — SODIUM CHLORIDE 9 MG/ML
50 INJECTION, SOLUTION INTRAVENOUS CONTINUOUS
Status: DISCONTINUED | OUTPATIENT
Start: 2019-02-12 | End: 2019-02-13

## 2019-02-12 RX ADMIN — NICOTINE 1 PATCH: 14 PATCH, EXTENDED RELEASE TRANSDERMAL at 08:20

## 2019-02-12 RX ADMIN — ASPIRIN 81 MG: 81 TABLET, CHEWABLE ORAL at 08:19

## 2019-02-12 RX ADMIN — QUETIAPINE FUMARATE 50 MG: 25 TABLET, FILM COATED ORAL at 08:19

## 2019-02-12 RX ADMIN — AMLODIPINE BESYLATE 5 MG: 5 TABLET ORAL at 08:19

## 2019-02-12 RX ADMIN — CARBAMAZEPINE 200 MG: 200 TABLET ORAL at 08:19

## 2019-02-12 RX ADMIN — SODIUM CHLORIDE 50 ML/HR: 9 INJECTION, SOLUTION INTRAVENOUS at 15:17

## 2019-02-12 RX ADMIN — ACETAMINOPHEN 650 MG: 325 TABLET, FILM COATED ORAL at 04:25

## 2019-02-12 RX ADMIN — SODIUM CHLORIDE, PRESERVATIVE FREE 3 ML: 5 INJECTION INTRAVENOUS at 08:18

## 2019-02-12 RX ADMIN — SERTRALINE 100 MG: 50 TABLET, FILM COATED ORAL at 08:18

## 2019-02-12 RX ADMIN — DABIGATRAN ETEXILATE MESYLATE 150 MG: 150 CAPSULE ORAL at 08:18

## 2019-02-12 RX ADMIN — SODIUM CHLORIDE, PRESERVATIVE FREE 10 ML: 5 INJECTION INTRAVENOUS at 08:20

## 2019-02-12 RX ADMIN — SODIUM CHLORIDE, PRESERVATIVE FREE 3 ML: 5 INJECTION INTRAVENOUS at 20:22

## 2019-02-12 NOTE — PROGRESS NOTES
Discharge Planning Assessment  [facility]     Patient Name: Mary Lou Graves  MRN: [id number]  Today's Date: 2/12/2019    Admit Date: 2/8/2019      Discharge Plan     Row Name 02/12/19 1413       Plan    Plan  Pt to be nursing home placement at discharge.  Pt on list [facility] and [facility].  SS will follow.         Destination      Service Provider Request Status Selected Services Address Phone Number Fax Number    THE [facility] Pending - Request Sent N/A [address] [facility] [address] [phone]    [facility] Pending - Request Sent N/A [address] [facility] [address] [phone]    [facility] Pending - Request Sent N/A [address] [address] [phone]    [facility] Pending - Request Sent N/A [address] [facility] [address] [phone]    [facility] Pending - Request Sent N/A [address] [facility] [address] [phone]    [facility] Pending - Request Sent N/A [address] [facility] [address] [phone]      Holly Goyal

## 2019-02-12 NOTE — DISCHARGE PLACEMENT REQUEST
"Mary Lou Graves (83 y.o. Female)     Date of Birth Social Security Number Address Home Phone MRN    1935  [address]  [place] [id number] [phone] [id number]    Islam Marital Status          None        Admission Date Admission Type Admitting Provider Attending Provider Department, Room/Bed    2/8/19 Emergency Nicole Valdes MD Kfoury, Wajdi Samir, MD [address], 3306/2S    Discharge Date Discharge Disposition Discharge Destination                       Attending Provider:  Nicole Valdes MD    Allergies:  No Known Allergies    Isolation:  None   Infection:  None   Code Status:  CPR    Ht:  165.1 cm (65\")   Wt:  37 kg (81 lb 8 oz)    Admission Cmt:  None   Principal Problem:  None                Active Insurance as of 2/8/2019     Primary Coverage     Payor Plan Insurance Group Employer/Plan Group    MEDICARE MEDICARE A & B      Payor Plan Address Payor Plan Phone Number Payor Plan Fax Number Effective Dates    [address] [phone]  1/1/1989 - None Entered    [place] [address]       Subscriber Name Subscriber Birth Date Member ID       MARY LOU GRAVES 1935 [id number]                 Emergency Contacts      (Rel.) Home Phone Work Phone Mobile Phone    LEONIDES GRAVES (Son) [phone] -- [phone]    RACHAEL GRAVES (Spouse) [phone] -- [phone]            Emergency Contact Information     Name Relation Home Work Mobile    LEONIDES GRAVES Son [phone]    RACHAEL GRAVES Spouse [phone]          Insurance Information                MEDICARE/MEDICARE A & B Phone: [phone]    Subscriber: Mary Lou Graves Subscriber#: [id number]    Group#:  Precert#:           Treatment Team     Provider Relationship Specialty Contact    Nicole Valdes MD Attending, Consulting Physician Infectious Diseases  [phone]    Conrado Schwartz, RN Registered Nurse --      Gurwinder Nettles MD Consulting " Physician Nephrology  [phone]    Po Verma, OLGA LIDIA Respiratory Therapist --      Anu Cabrera PT Physical Therapist Physical Therapy            Problem List           Codes Noted - Resolved       Hospital    Failure to thrive in adult ICD-10-CM: R62.7  ICD-9-CM: 783.7 2019 - Present       Non-Hospital    Acute UTI ICD-10-CM: N39.0  ICD-9-CM: 599.0 2018 - Present             History & Physical      Fernanda Al PA-C at 2019 10:30 AM                   HISTORY AND PHYSICAL        Patient Identification:  Name:  Mary Lou Graves  Age:  83 y.o.  Sex:  female  :  1935  MRN:  [id number]   Visit Number:  [id number]  Primary Care Physician:  Provider, No Known       Subjective     Subjective     Chief complaint:     Chief Complaint   Patient presents with   • Dementia     PATIENT WAS SEEN BY FAMILY PHYSICIAN THIS AM AND WOULD NOT GET OUT OF THE CAR. THE PLAN WAS TO ECVALUATE HER FOR NURSING HOME PLACEMENT DUE TO WORSENING DEMENTIA.       History of presenting illness:     The patient is an 83 year old female who presented to the ED on 19 with worsening disorientation and decrease PO intake. The patient seems to be suffering from failure to thrive. The patients son reported he is no longer able to take care of the patient due to her worsening condition.  were contacted and recommend admission to observation unit for further evaluation and possible nursing home placement. Workup in the ED was essentially negative other than worsening BNP at 815.0 and chest xray showing bibasilar effusions and minimal bibasilar airspace disease. Upon exam this morning she was fairly comfortable and pleasantly confused. Sitter at bedside. Denies any complaints but a very poor historian due to confusion.       ---------------------------------------------------------------------------------------------------------------------     Review Of Systems:    Unable to obtain due to the patients  decreased mental status.   ---------------------------------------------------------------------------------------------------------------------     Past Medical History    Past Medical History:   Diagnosis Date   • Dementia    • Depression    • Hyperlipidemia    • Hypertension        Past Surgical History    Past Surgical History:   Procedure Laterality Date   • HYSTERECTOMY         Family History    History reviewed. No pertinent family history.    Social History    Social History     Tobacco Use   • Smoking status: Current Every Day Smoker     Packs/day: 0.50   • Smokeless tobacco: Never Used   Substance Use Topics   • Alcohol use: No     Frequency: Never     Comment: denies   • Drug use: No     Comment: denies       Allergies    Patient has no known allergies.  ---------------------------------------------------------------------------------------------------------------------     Home Medications:    Prior to Admission Medications     Prescriptions Last Dose Informant Patient Reported? Taking?    amLODIPine (NORVASC) 5 MG tablet 2/7/2019 Child Yes Yes    Take 5 mg by mouth Daily.    aspirin 81 MG chewable tablet 2/7/2019 Child Yes Yes    Chew 81 mg Daily.    carBAMazepine (TEGretol) 200 MG tablet 2/7/2019 Child Yes Yes    Take 200 mg by mouth 2 (Two) Times a Day.    dabigatran etexilate (PRADAXA) 150 MG capsu 2/7/2019 Child Yes Yes    Take 150 mg by mouth 2 (Two) Times a Day.    ibuprofen (ADVIL,MOTRIN) 400 MG tablet 2/7/2019 Child Yes Yes    Take 400 mg by mouth Every 6 (Six) Hours As Needed for Mild Pain .    mirtazapine (REMERON SOL-TAB) 15 MG disintegrating tablet 2/7/2019 Child Yes Yes    Take 15 mg by mouth Every Night.    nicotine (NICODERM CQ) 14 MG/24HR patch 2/7/2019 Child Yes Yes    Place 1 patch on the skin as directed by provider Daily.    polyethyl glycol-propyl glycol (SYSTANE) 0.4-0.3 % solution ophthalmic solution 2/7/2019 Child Yes Yes    Administer 1 drop to both eyes 3 (Three) Times a Day.     QUEtiapine (SEROquel) 100 MG tablet 2/7/2019 Child Yes Yes    Take 150 mg by mouth Every Night.    QUEtiapine (SEROquel) 50 MG tablet 2/7/2019 Child Yes Yes    Take 50 mg by mouth Every Morning.    sertraline (ZOLOFT) 100 MG tablet 2/7/2019 Child Yes Yes    Take 100 mg by mouth Daily.        ---------------------------------------------------------------------------------------------------------------------    Objective     Objective     Hospital Scheduled Meds:    amLODIPine 5 mg Oral Daily   aspirin 81 mg Oral Daily   carBAMazepine 200 mg Oral BID   dabigatran etexilate 150 mg Oral BID   mirtazapine 15 mg Oral Nightly   nicotine 1 patch Transdermal Q24H   potassium chloride 40 mEq Oral Q4H   QUEtiapine 150 mg Oral Nightly   QUEtiapine 50 mg Oral Daily   sertraline 100 mg Oral Daily   sodium chloride 3 mL Intravenous Q12H        ---------------------------------------------------------------------------------------------------------------------   Vital Signs:  Temp:  [97.7 °F (36.5 °C)-98.7 °F (37.1 °C)] 98.7 °F (37.1 °C)  Heart Rate:  [59-66] 66  Resp:  [18-20] 18  BP: (112-160)/(68-74) 160/69  Mean Arterial Pressure (Non-Invasive) for the past 24 hrs (Last 3 readings):   Noninvasive MAP (mmHg)   02/09/19 0800 94   02/09/19 0405 85   02/09/19 0000 110     SpO2 Percentage    02/09/19 0253 02/09/19 0405 02/09/19 0800   SpO2: 95% 93% 95%     SpO2:  [93 %-97 %] 95 %  on   ;   Device (Oxygen Therapy): room air    Body mass index is 20.3 kg/m².  Wt Readings from Last 3 Encounters:   02/08/19 55.3 kg (122 lb)   12/21/18 44 kg (97 lb)   12/17/18 44 kg (97 lb)     ---------------------------------------------------------------------------------------------------------------------     Physical Exam:    Constitutional:  Well-developed and well-nourished.  No respiratory distress. Calm. Confused.      HENT:  Head: Normocephalic and atraumatic.  Mouth:  Moist mucous membranes.    Eyes:  Conjunctivae and EOM are normal.  No  scleral icterus.  Neck:  Neck supple.  No JVD present.    Cardiovascular:  Normal rate, regular rhythm and normal heart sounds with no murmur. No edema.  Pulmonary/Chest:  No respiratory distress, no wheezes, no crackles, with normal breath sounds and good air movement.  Abdominal:  Soft.  Bowel sounds are normal.  No distension and no tenderness.   Musculoskeletal:  No edema, no tenderness, and no deformity.  No swelling or redness of joints.  Neurological:  Disoriented.  No facial droop.  No slurred speech.   Skin:  Skin is warm and dry.  No rash noted.  No pallor.   Psychiatric:  Normal mood and affect.  Behavior is normal.    ---------------------------------------------------------------------------------------------------------------------  I have personally reviewed the EKG/Telemetry strip  ---------------------------------------------------------------------------------------------------------------------         Results from last 7 days   Lab Units 02/09/19  0038   CHOLESTEROL mg/dL 189   TRIGLYCERIDES mg/dL 159*   HDL CHOL mg/dL 26*   LDL CHOL mg/dL 131*       Results from last 7 days   Lab Units 02/09/19  0038 02/08/19  1640   WBC 10*3/mm3 8.38 9.81   HEMOGLOBIN g/dL 11.5* 10.8*   HEMATOCRIT % 35.5* 33.3*   MCV fL 89.4 89.0   MCHC g/dL 32.4* 32.4*   PLATELETS 10*3/mm3 284 251     Results from last 7 days   Lab Units 02/09/19  0038 02/08/19  1640   SODIUM mmol/L 140 137   POTASSIUM mmol/L 3.5 3.8   CHLORIDE mmol/L 101 105   CO2 mmol/L 28.2 23.9*   BUN mg/dL 19 22*   CREATININE mg/dL 0.65 0.66   EGFR IF NONAFRICN AM mL/min/1.73 87 86   CALCIUM mg/dL 8.6 8.6   GLUCOSE mg/dL 95 106   ALBUMIN g/dL 3.80 3.60   BILIRUBIN mg/dL 0.5 0.6   ALK PHOS U/L 100 96   AST (SGOT) U/L 50* 49*   ALT (SGPT) U/L 54* 56*   Estimated Creatinine Clearance: 48.1 mL/min (by C-G formula based on SCr of 0.65 mg/dL).  No results found for: AMMONIA    No results found for: HGBA1C, POCGLU  No results found for: HGBA1C  Lab Results    Component Value Date    TSH 4.532 02/09/2019                      Pain Management Panel     Pain Management Panel Latest Ref Rng & Units 2/8/2019 12/22/2018    AMPHETAMINES SCREEN, URINE Negative Negative Negative    BARBITURATES SCREEN Negative Negative Negative    BENZODIAZEPINE SCREEN, URINE Negative Negative Negative    BUPRENORPHINE Negative Negative Negative    COCAINE SCREEN, URINE Negative Negative Negative    METHADONE SCREEN, URINE Negative Negative Negative        I have personally reviewed the above laboratory results.   ---------------------------------------------------------------------------------------------------------------------  Imaging Results (last 7 days)     Procedure Component Value Units Date/Time    XR Chest 1 View [[id number]] Collected:  02/08/19 1616     Updated:  02/08/19 1620    Narrative:       XR CHEST 1 VW-     CLINICAL INDICATION: disoriented          COMPARISON: 12/22/2018      TECHNIQUE: Single frontal view of the chest.     FINDINGS:     Bibasilar effusions and minimal bibasilar airspace disease  The cardiac silhouette is normal. The pulmonary vasculature is  unremarkable.  There is no evidence of an acute osseous abnormality.   There are no suspicious-appearing parenchymal soft tissue nodules.            Impression:       Bibasilar effusions and minimal bibasilar airspace disease         This report was finalized on 2/8/2019 4:18 PM by Dr. Yanick Ford MD.           I have personally reviewed the above radiology results.   ---------------------------------------------------------------------------------------------------------------------      Assessment & Plan        Assessment/Plan       ASSESSMENT:    1. Failure to thrive  2. Social issues  3. CHF exacerbation    PLAN:    The patient is an 83 year old female who presented to the ED on 2/9/19 with worsening disorientation and decrease PO intake. The patient seems to be suffering from failure to thrive. The patients son  reported he is no longer able to take care of the patient due to her worsening condition.  were contacted and recommend admission to observation unit for further evaluation and possible nursing home placement. Workup in the ED was essentially negative other than worsening BNP at 815.0 and chest xray showing bibasilar effusions and minimal bibasilar airspace disease. Upon exam this morning she was fairly comfortable and pleasantly confused. Sitter at bedside. Denies any complaints but a very poor historian due to confusion.     Echo ordered. Lasix 20mg IV x once given.    Patient evaluated with her nurse Nisha.     Sitter at bedside.     SCDs for DVT prophylaxis.      working on possible nursing home placement. Will make inpatient as the patient is going to require a longer stay.     Patient's findings and recommendations were discussed with patient and nursing staff      Code Status:   Code Status and Medical Interventions:   Ordered at: 02/08/19 2058     Code Status:    CPR     Medical Interventions (Level of Support Prior to Arrest):    Full           Fernanda Al PA-C  02/09/19  10:32 AM      Electronically signed by Nicole Valdes MD at 2/11/2019  5:07 AM       Lines, Drains & Airways    Active LDAs     Name:   Placement date:   Placement time:   Site:   Days:    Midline Catheter - Single Lumen 02/11/19 Left Basilic   02/11/19    0936     1    Peripheral IV 02/09/19 0930 Anterior;Left;Upper Arm   02/09/19    0930    Arm   3                Hospital Medications (active)       Dose Frequency Start End    acetaminophen (TYLENOL) tablet 650 mg 650 mg Every 4 Hours PRN 2/8/2019     Sig - Route: Take 2 tablets by mouth Every 4 (Four) Hours As Needed for Mild Pain . - Oral    Cosign for Ordering: Accepted by Nicole Valdes MD on 2/11/2019  5:12 AM    amLODIPine (NORVASC) tablet 5 mg 5 mg Daily 2/9/2019     Sig - Route: Take 1 tablet by mouth Daily. - Oral    aspirin  "chewable tablet 81 mg 81 mg Daily 2/9/2019     Sig - Route: Chew 1 tablet Daily. - Oral    carBAMazepine (TEGretol) tablet 200 mg 200 mg 2 Times Daily 2/9/2019     Sig - Route: Take 1 tablet by mouth 2 (Two) Times a Day. - Oral    carboxymethylcellulose (REFRESH PLUS) 0.5 % ophthalmic solution 1 drop 1 drop 3 Times Daily PRN 2/8/2019     Sig - Route: Administer 1 drop to both eyes 3 (Three) Times a Day As Needed for Dry Eyes. - Both Eyes    dabigatran etexilate (PRADAXA) capsule 150 mg 150 mg 2 Times Daily 2/9/2019     Sig - Route: Take 1 capsule by mouth 2 (Two) Times a Day. - Oral    Magnesium Sulfate 2 gram / 50mL Infusion (GIVE X 3 BAGS TO EQUAL 6GM TOTAL DOSE) - Mg 1.1 - 1.5 mg/dl 2 g As Needed 2/9/2019     Sig - Route: Infuse 50 mL into a venous catheter As Needed (See Administration Instructions). - Intravenous    Cosign for Ordering: Accepted by Nicole Valdes MD on 2/11/2019  5:12 AM    Linked Group 1:  \"Or\" Linked Group Details        Magnesium Sulfate 2 gram Bolus, followed by 8 gram infusion (total Mg dose 10 grams)- Mg less than or equal to 1mg/dL 2 g As Needed 2/9/2019     Sig - Route: Infuse 50 mL into a venous catheter As Needed (See Administration Instructions). - Intravenous    Cosign for Ordering: Accepted by Nicole Valdes MD on 2/11/2019  5:12 AM    Linked Group 1:  \"Or\" Linked Group Details        Magnesium Sulfate 4 gram infusion- Mg 1.6-1.9 mg/dL 4 g As Needed 2/9/2019     Sig - Route: Infuse 100 mL into a venous catheter As Needed (See Administration Instructions). - Intravenous    Cosign for Ordering: Accepted by Nicole Valdes MD on 2/11/2019  5:12 AM    Linked Group 1:  \"Or\" Linked Group Details        mirtazapine (REMERON) tablet 15 mg 15 mg Nightly 2/9/2019     Sig - Route: Take 1 tablet by mouth Every Night. - Oral    nicotine (NICODERM CQ) 14 MG/24HR patch 1 patch 1 patch Every 24 Hours 2/9/2019     Sig - Route: Place 1 patch on the skin as directed by provider " "Daily. - Transdermal    nitroglycerin (NITROSTAT) SL tablet 0.4 mg 0.4 mg Every 5 Minutes PRN 2/8/2019     Sig - Route: Place 1 tablet under the tongue Every 5 (Five) Minutes As Needed for Chest Pain (Chest Pain With Systolic Blood Pressure Greater Than 100). - Sublingual    Cosign for Ordering: Accepted by Nicole Valdes MD on 2/11/2019  5:12 AM    ondansetron (ZOFRAN) injection 4 mg 4 mg Every 6 Hours PRN 2/8/2019     Sig - Route: Infuse 2 mL into a venous catheter Every 6 (Six) Hours As Needed for Nausea or Vomiting. - Intravenous    Cosign for Ordering: Accepted by Nicole Valdes MD on 2/11/2019  5:12 AM    Linked Group 2:  \"Or\" Linked Group Details        ondansetron (ZOFRAN) tablet 4 mg 4 mg Every 6 Hours PRN 2/8/2019     Sig - Route: Take 1 tablet by mouth Every 6 (Six) Hours As Needed for Nausea or Vomiting. - Oral    Cosign for Ordering: Accepted by Nicole Valdes MD on 2/11/2019  5:12 AM    Linked Group 2:  \"Or\" Linked Group Details        ondansetron ODT (ZOFRAN-ODT) disintegrating tablet 4 mg 4 mg Every 6 Hours PRN 2/8/2019     Sig - Route: Take 1 tablet by mouth Every 6 (Six) Hours As Needed for Nausea or Vomiting. - Oral    Cosign for Ordering: Accepted by Nicole Valdes MD on 2/11/2019  5:12 AM    Linked Group 2:  \"Or\" Linked Group Details        potassium chloride (KLOR-CON) packet 40 mEq 40 mEq As Needed 2/9/2019     Sig - Route: Take 40 mEq by mouth As Needed (potassium replacement, see admin instructions). - Oral    Cosign for Ordering: Accepted by Nicloe Valdes MD on 2/11/2019  5:12 AM    Linked Group 3:  \"Or\" Linked Group Details        potassium chloride (MICRO-K) CR capsule 40 mEq 40 mEq As Needed 2/9/2019     Sig - Route: Take 4 capsules by mouth As Needed (potassium replacement.  see admin instructions). - Oral    Cosign for Ordering: Accepted by Nicole Valdes MD on 2/11/2019  5:12 AM    Linked Group 3:  \"Or\" Linked Group Details        potassium " "chloride 10 mEq in 100 mL IVPB 10 mEq Every 1 Hour PRN 2/9/2019     Sig - Route: Infuse 100 mL into a venous catheter Every 1 (One) Hour As Needed (potassium protocol PERIPHERAL - see admin instructions). - Intravenous    Cosign for Ordering: Accepted by Nicole Valdes MD on 2/11/2019  5:12 AM    Linked Group 3:  \"Or\" Linked Group Details        QUEtiapine (SEROquel) tablet 150 mg 150 mg Nightly 2/9/2019     Sig - Route: Take 150 mg by mouth Every Night. - Oral    QUEtiapine (SEROquel) tablet 50 mg 50 mg Daily 2/9/2019     Sig - Route: Take 2 tablets by mouth Daily. - Oral    sertraline (ZOLOFT) tablet 100 mg 100 mg Daily 2/9/2019     Sig - Route: Take 2 tablets by mouth Daily. - Oral    sodium chloride 0.9 % flush 10 mL 10 mL As Needed 2/8/2019     Sig - Route: Infuse 10 mL into a venous catheter As Needed for Line Care. - Intravenous    Cosign for Ordering: Accepted by Ajith Bill MD on 2/8/2019  5:23 PM    Linked Group 4:  \"And\" Linked Group Details        sodium chloride 0.9 % flush 10 mL 10 mL Every 12 Hours Scheduled 2/11/2019     Sig - Route: Infuse 10 mL into a venous catheter Every 12 (Twelve) Hours. - Intravenous    sodium chloride 0.9 % flush 10 mL 10 mL As Needed 2/11/2019     Sig - Route: Infuse 10 mL into a venous catheter As Needed for Line Care (After Medication Administration). - Intravenous    sodium chloride 0.9 % flush 3 mL 3 mL Every 12 Hours Scheduled 2/8/2019     Sig - Route: Infuse 3 mL into a venous catheter Every 12 (Twelve) Hours. - Intravenous    Cosign for Ordering: Accepted by Nicole Valdes MD on 2/11/2019  5:12 AM    sodium chloride 0.9 % flush 3-10 mL 3-10 mL As Needed 2/8/2019     Sig - Route: Infuse 3-10 mL into a venous catheter As Needed for Line Care. - Intravenous    Cosign for Ordering: Accepted by Nicole Valdes MD on 2/11/2019  5:12 AM            Lab Results (last 24 hours)     Procedure Component Value Units Date/Time    Comprehensive Metabolic " Panel [[id number]]  (Abnormal) Collected:  02/12/19 0655    Specimen:  Blood Updated:  02/12/19 0744     Glucose 141 mg/dL      BUN 27 mg/dL      Creatinine 0.81 mg/dL      Sodium 136 mmol/L      Potassium 3.5 mmol/L      Chloride 101 mmol/L      CO2 28.4 mmol/L      Calcium 8.9 mg/dL      Total Protein 6.3 g/dL      Albumin 3.50 g/dL      ALT (SGPT) 37 U/L      AST (SGOT) 25 U/L      Alkaline Phosphatase 93 U/L      Comment: Note New Reference Ranges        Total Bilirubin 0.3 mg/dL      eGFR Non African Amer 68 mL/min/1.73      Globulin 2.8 gm/dL      A/G Ratio 1.3 g/dL      BUN/Creatinine Ratio 33.3     Anion Gap 6.6 mmol/L     Narrative:       The MDRD GFR formula is only valid for adults with stable renal function between ages 18 and 70.    Osmolality, Calculated [[id number]]  (Normal) Collected:  02/12/19 0655    Specimen:  Blood Updated:  02/12/19 0744     Osmolality Calc 279.4 mOsm/kg     C-reactive Protein [[id number]]  (Abnormal) Collected:  02/12/19 0332    Specimen:  Blood Updated:  02/12/19 0605     C-Reactive Protein 2.08 mg/dL      Comment: 3+ Hemolysis         CBC & Differential [[id number]] Collected:  02/12/19 0333    Specimen:  Blood Updated:  02/12/19 0538    Narrative:       The following orders were created for panel order CBC & Differential.  Procedure                               Abnormality         Status                     ---------                               -----------         ------                     CBC Auto Differential[[id number]]        Abnormal            Final result                 Please view results for these tests on the individual orders.    CBC Auto Differential [[id number]]  (Abnormal) Collected:  02/12/19 0333    Specimen:  Blood Updated:  02/12/19 0538     WBC 12.48 10*3/mm3      RBC 3.97 10*6/mm3      Hemoglobin 11.3 g/dL      Hematocrit 35.4 %      MCV 89.2 fL      MCH 28.5 pg      MCHC 31.9 g/dL      RDW 13.8 %      RDW-SD 44.2 fl      MPV 10.3 fL      Platelets 336  10*3/mm3      Neutrophil % 84.0 %      Lymphocyte % 6.4 %      Monocyte % 8.5 %      Eosinophil % 0.6 %      Basophil % 0.2 %      Immature Grans % 0.3 %      Neutrophils, Absolute 10.47 10*3/mm3      Lymphocytes, Absolute 0.80 10*3/mm3      Monocytes, Absolute 1.06 10*3/mm3      Eosinophils, Absolute 0.08 10*3/mm3      Basophils, Absolute 0.03 10*3/mm3      Immature Grans, Absolute 0.04 10*3/mm3         Orders (last 24 hrs)     Start     Ordered    02/13/19 0600  Basic Metabolic Panel  Morning Draw      02/12/19 1255    02/13/19 0600  CBC & Differential  Morning Draw      02/12/19 1257    02/13/19 0600  Comprehensive Metabolic Panel  Morning Draw      02/12/19 1257    02/13/19 0600  C-reactive Protein  Morning Draw      02/12/19 1257    02/13/19 0600  BNP  Morning Draw      02/12/19 1257    02/12/19 1256  Sodium, Urine, Random - Urine, Clean Catch  Once      02/12/19 1255    02/12/19 1256  Creatinine, Urine, Random - Urine, Clean Catch  Once      02/12/19 1255    02/12/19 0954  Inpatient Nephrology Consult  Once     Specialty:  Nephrology  Provider:  Gurwinder Nettles MD    02/12/19 0954    02/12/19 0745  Osmolality, Calculated  Once      02/12/19 0744    02/12/19 0604  Comprehensive Metabolic Panel  Morning Draw      02/11/19 0945    02/12/19 0600  CBC & Differential  Morning Draw      02/11/19 0945    02/12/19 0600  C-reactive Protein  Morning Draw      02/11/19 0945    02/12/19 0600  CBC Auto Differential  PROCEDURE ONCE      02/12/19 0002    02/11/19 1030  sodium chloride 0.9 % flush 10 mL  Every 12 Hours Scheduled      02/11/19 0936    02/11/19 0935  sodium chloride 0.9 % flush 10 mL  As Needed      02/11/19 0936    02/09/19 1200  Dietary Nutrition Supplements Boost Plus (Ensure Enlive, Ensure Plus)  Daily With Breakfast, Lunch & Dinner      02/09/19 1158    02/09/19 0907  Magnesium Sulfate 2 gram Bolus, followed by 8 gram infusion (total Mg dose 10 grams)- Mg less than or equal to 1mg/dL  As Needed      02/09/19  0907 02/09/19 0907  Magnesium Sulfate 2 gram / 50mL Infusion (GIVE X 3 BAGS TO EQUAL 6GM TOTAL DOSE) - Mg 1.1 - 1.5 mg/dl  As Needed      02/09/19 0907 02/09/19 0907  Magnesium Sulfate 4 gram infusion- Mg 1.6-1.9 mg/dL  As Needed      02/09/19 0907 02/09/19 0907  potassium chloride (MICRO-K) CR capsule 40 mEq  As Needed      02/09/19 0907 02/09/19 0907  potassium chloride (KLOR-CON) packet 40 mEq  As Needed      02/09/19 0907 02/09/19 0907  potassium chloride 10 mEq in 100 mL IVPB  Every 1 Hour PRN      02/09/19 0907 02/09/19 0900  amLODIPine (NORVASC) tablet 5 mg  Daily      02/08/19 2219 02/09/19 0900  aspirin chewable tablet 81 mg  Daily      02/08/19 2219 02/09/19 0900  QUEtiapine (SEROquel) tablet 50 mg  Daily      02/08/19 2219 02/09/19 0900  sertraline (ZOLOFT) tablet 100 mg  Daily      02/08/19 2219 02/09/19 0900  nicotine (NICODERM CQ) 14 MG/24HR patch 1 patch  Every 24 Hours      02/08/19 2219 02/09/19 0000  mirtazapine (REMERON) tablet 15 mg  Nightly      02/08/19 2219 02/09/19 0000  dabigatran etexilate (PRADAXA) capsule 150 mg  2 Times Daily      02/08/19 2219 02/09/19 0000  carBAMazepine (TEGretol) tablet 200 mg  2 Times Daily      02/08/19 2219 02/09/19 0000  QUEtiapine (SEROquel) tablet 150 mg  Nightly      02/08/19 2219 02/08/19 2230  sodium chloride 0.9 % flush 3 mL  Every 12 Hours Scheduled      02/08/19 2132 02/08/19 2216  carboxymethylcellulose (REFRESH PLUS) 0.5 % ophthalmic solution 1 drop  3 Times Daily PRN      02/08/19 2219 02/08/19 2132  acetaminophen (TYLENOL) tablet 650 mg  Every 4 Hours PRN      02/08/19 2132 02/08/19 2132  ondansetron (ZOFRAN) tablet 4 mg  Every 6 Hours PRN      02/08/19 2132 02/08/19 2132  ondansetron ODT (ZOFRAN-ODT) disintegrating tablet 4 mg  Every 6 Hours PRN      02/08/19 2132 02/08/19 2132  ondansetron (ZOFRAN) injection 4 mg  Every 6 Hours PRN      02/08/19 2132 02/08/19 2132  sodium chloride 0.9 %  flush 3-10 mL  As Needed      02/08/19 2132 02/08/19 2132  nitroglycerin (NITROSTAT) SL tablet 0.4 mg  Every 5 Minutes PRN      02/08/19 2132 02/08/19 1721  sodium chloride 0.9 % flush 10 mL  As Needed      02/08/19 1722    Unscheduled  ECG 12 Lead  As Needed     Comments:  Nurse to Release if Patient Expericences Acute Chest Pain or Dysrhythmias    02/08/19 2132    Unscheduled  Potassium  As Needed     Comments:  For Ventricular Arrhythmias      02/08/19 2132    Unscheduled  Magnesium  As Needed     Comments:  For Ventricular Arrhythmias      02/08/19 2132    Unscheduled  Troponin  As Needed     Comments:  For Chest Pain      02/08/19 2132    Unscheduled  Digoxin Level  As Needed     Comments:  For Atrial Arrhythmias      02/08/19 2132    Unscheduled  Blood Gas, Arterial  As Needed     Comments:  Per O2 PolicyNotify Physician      02/08/19 2132    Unscheduled  Up With Assistance  As Needed      02/08/19 2132    Unscheduled  Change Dressing to IV Site As Needed When Damp, Loose or Soiled  As Needed      02/09/19 0719    Unscheduled  Change Needleless Connectors  As Needed     Comments:  Change Needleless Connectors When:  - Removed For Any Reason  - Residual Blood or Debris Within Connector  - Prior to Drawing Blood Cultures  - Contamination of Connector  - After Administration of Blood or Blood Components    02/09/19 0719    Unscheduled  Magnesium  As Needed      02/09/19 0907    Unscheduled  Potassium  As Needed      02/09/19 0907    Unscheduled  Change Dressing to IV Site As Needed When Damp, Loose or Soiled  As Needed      02/11/19 0936    Unscheduled  Change Needleless Connectors  As Needed     Comments:  Change Needleless Connectors When:  - Removed For Any Reason  - Residual Blood or Debris Within Connector  - Prior to Drawing Blood Cultures  - Contamination of Connector  - After Administration of Blood or Blood Components    02/11/19 0936    --  amLODIPine (NORVASC) 5 MG tablet  Daily      02/08/19      --  aspirin 81 MG chewable tablet  Daily      19    --  mirtazapine (REMERON SOL-TAB) 15 MG disintegrating tablet  Nightly      19    --  QUEtiapine (SEROquel) 50 MG tablet  Every Morning      19    --  sertraline (ZOLOFT) 100 MG tablet  Daily      19    --  dabigatran etexilate (PRADAXA) 150 MG capsu  2 Times Daily      19    --  carBAMazepine (TEGretol) 200 MG tablet  2 Times Daily      19    --  ibuprofen (ADVIL,MOTRIN) 400 MG tablet  Every 6 Hours PRN      19    --  nicotine (NICODERM CQ) 14 MG/24HR patch  Every 24 Hours      19    --  QUEtiapine (SEROquel) 100 MG tablet  Nightly      19    --  polyethyl glycol-propyl glycol (SYSTANE) 0.4-0.3 % solution ophthalmic solution  3 Times Daily      19          Operative/Procedure Notes (last 24 hours) (Notes from 2019  2:10 PM through 2019  2:10 PM)     No notes of this type exist for this encounter.           Physician Progress Notes (last 24 hours) (Notes from 2019  2:11 PM through 2019  2:11 PM)      Fernanda Al PA-C at 2019 12:45 PM                     PROGRESS NOTE         Patient Identification:  Name:  Mary Lou Graves  Age:  83 y.o.  Sex:  female  :  1935  MRN:  [id number]  Visit Number:  [id number]  Primary Care Provider:  London Merritt MD      ----------------------------------------------------------------------------------------------------------------------  Subjective       Chief Complaints:    Dementia (PATIENT WAS SEEN BY FAMILY PHYSICIAN THIS AM AND WOULD NOT GET OUT OF THE CAR. THE PLAN WAS TO ECVALUATE HER FOR NURSING HOME PLACEMENT DUE TO WORSENING DEMENTIA.)         Interval History:      The patient appeared comfortable this morning with LEYLA Camp at bedside. The patient is reported to have an uneventful night but has had a significant decrease in urine output. Nursing staff reports  bladder scan performed and straight cathed with only 35cc in around 12 hours. Nephrology was consulted. The patient also continues to have poor oral intake. Unable to give fluids due to risk outweighing benefit in the setting of CHF.     Review of Systems:    Unable to obtain due to the patients confusion  ----------------------------------------------------------------------------------------------------------------------      Objective       Current Hospital Meds:    amLODIPine 5 mg Oral Daily   aspirin 81 mg Oral Daily   carBAMazepine 200 mg Oral BID   dabigatran etexilate 150 mg Oral BID   mirtazapine 15 mg Oral Nightly   nicotine 1 patch Transdermal Q24H   QUEtiapine 150 mg Oral Nightly   QUEtiapine 50 mg Oral Daily   sertraline 100 mg Oral Daily   sodium chloride 10 mL Intravenous Q12H   sodium chloride 3 mL Intravenous Q12H        ----------------------------------------------------------------------------------------------------------------------    Vital Signs:  Temp:  [96.6 °F (35.9 °C)-98 °F (36.7 °C)] 98 °F (36.7 °C)  Heart Rate:  [70] 70  Resp:  [18] 18  BP: (128-150)/(68-90) 128/69  Mean Arterial Pressure (Non-Invasive) for the past 24 hrs (Last 3 readings):   Noninvasive MAP (mmHg)   02/11/19 1448 112     SpO2 Percentage    02/11/19 1448 02/11/19 1815 02/12/19 0344   SpO2: 95% 91% 97%     SpO2:  [91 %-97 %] 97 %  on   ;   Device (Oxygen Therapy): room air    Body mass index is 13.56 kg/m².  Wt Readings from Last 3 Encounters:   02/12/19 37 kg (81 lb 8 oz)   12/21/18 44 kg (97 lb)   12/17/18 44 kg (97 lb)        Intake/Output Summary (Last 24 hours) at 2/12/2019 1240  Last data filed at 2/12/2019 0344  Gross per 24 hour   Intake 240 ml   Output 100 ml   Net 140 ml     Diet Regular  ----------------------------------------------------------------------------------------------------------------------    Physical Exam:     Constitutional:  Well-developed and well-nourished.  No respiratory distress. Calm.  Confused.      HENT:  Head: Normocephalic and atraumatic.  Mouth:  Moist mucous membranes.    Eyes:  Conjunctivae and EOM are normal.  No scleral icterus.  Neck:  Neck supple.  No JVD present.    Cardiovascular:  Normal rate, regular rhythm and normal heart sounds with no murmur. No edema.  Pulmonary/Chest:  No respiratory distress, no wheezes, no crackles, with normal breath sounds and good air movement.  Abdominal:  Soft.  Bowel sounds are normal.  No distension and no tenderness.   Musculoskeletal:  No edema, no tenderness, and no deformity.  No swelling or redness of joints.  Neurological:  Disoriented.  No facial droop.  No slurred speech.   Skin:  Skin is warm and dry.  No rash noted.  No pallor.   Psychiatric:  Normal mood and affect.  Behavior is normal.    ----------------------------------------------------------------------------------------------------------------------  I have personally reviewed the EKG/Telemetry strips   ----------------------------------------------------------------------------------------------------------------------        Results from last 7 days   Lab Units 02/09/19  0038   CHOLESTEROL mg/dL 189   TRIGLYCERIDES mg/dL 159*   HDL CHOL mg/dL 26*   LDL CHOL mg/dL 131*       Results from last 7 days   Lab Units 02/12/19  0333 02/12/19  0332 02/11/19  0437 02/09/19  0038   CRP mg/dL  --  2.08* 2.57*  --    WBC 10*3/mm3 12.48  --  10.38 8.38   HEMOGLOBIN g/dL 11.3*  --  12.0 11.5*   HEMATOCRIT % 35.4*  --  36.9* 35.5*   MCV fL 89.2  --  89.1 89.4   MCHC g/dL 31.9*  --  32.5* 32.4*   PLATELETS 10*3/mm3 336  --  329 284     Results from last 7 days   Lab Units 02/12/19  0655 02/11/19  0437 02/10/19  0400 02/09/19  0038   SODIUM mmol/L 136 141  --  140   POTASSIUM mmol/L 3.5 3.7 4.8 3.5   CHLORIDE mmol/L 101 101  --  101   CO2 mmol/L 28.4 30.7  --  28.2   BUN mg/dL 27* 25*  --  19   CREATININE mg/dL 0.81 0.67  --  0.65   EGFR IF NONAFRICN AM mL/min/1.73 68 84  --  87   CALCIUM mg/dL 8.9  9.0  --  8.6   GLUCOSE mg/dL 141* 112*  --  95   ALBUMIN g/dL 3.50 3.70  --  3.80   BILIRUBIN mg/dL 0.3 0.4  --  0.5   ALK PHOS U/L 93 96  --  100   AST (SGOT) U/L 25 40*  --  50*   ALT (SGPT) U/L 37* 54*  --  54*   Estimated Creatinine Clearance: 31.8 mL/min (by C-G formula based on SCr of 0.81 mg/dL).  No results found for: AMMONIA    No results found for: HGBA1C, POCGLU  No results found for: HGBA1C  Lab Results   Component Value Date    TSH 4.532 02/09/2019     Pain Management Panel     Pain Management Panel Latest Ref Rng & Units 2/8/2019 12/22/2018    AMPHETAMINES SCREEN, URINE Negative Negative Negative    BARBITURATES SCREEN Negative Negative Negative    BENZODIAZEPINE SCREEN, URINE Negative Negative Negative    BUPRENORPHINE Negative Negative Negative    COCAINE SCREEN, URINE Negative Negative Negative    METHADONE SCREEN, URINE Negative Negative Negative          I have personally reviewed the above laboratory results.   ----------------------------------------------------------------------------------------------------------------------  Imaging Results (last 24 hours)     ** No results found for the last 24 hours. **        I have personally reviewed the above radiology results.   ----------------------------------------------------------------------------------------------------------------------    Assessment/Plan       Assessment/Plan     ASSESSMENT:      1. Failure to thrive  2. Social issues  3. CHF exacerbation     PLAN:    The patient appeared comfortable this morning with LEYLA Camp at bedside. The patient is reported to have an uneventful night but has had a significant decrease in urine output. Nursing staff reports bladder scan performed and straight cathed with only 35cc in around 12 hours. Nephrology was consulted. The patient also continues to have poor oral intake. Unable to give fluids due to risk outweighing benefit in the setting of CHF.     BNP is elevated at 973 from 815. Does not  appear to be in respiratory distress. Repeat chest xray shows improvement of CHF/edema and bilateral airspace disease. Echo shows an EF of 61-65%. Normal white count with CRP slightly elevated at 2.57. Awaiting nursing home placement.       SCDs for DVT prophylaxis.      Discharge planning in place per case management awaiting nursing home placement.    CBC, CRP, CMP ordered for am.       Code Status:   Code Status and Medical Interventions:   Ordered at: 19     Code Status:    CPR     Medical Interventions (Level of Support Prior to Arrest):    Full       Fernanda Al PA-C  19  12:45 PM    Electronically signed by Fernanda Al PA-C at 2019 12:56 PM       Consult Notes (last 24 hours) (Notes from 2019  2:11 PM through 2019  2:11 PM)     No notes of this type exist for this encounter.        Nutrition Notes (last 24 hours) (Notes from 2019  2:11 PM through 2019  2:11 PM)     No notes of this type exist for this encounter.        Physical Therapy Notes (last 24 hours) (Notes from 2019  2:11 PM through 2019  2:11 PM)     No notes of this type exist for this encounter.           Occupational Therapy Notes (last 24 hours) (Notes from 2019  2:11 PM through 2019  2:11 PM)      Maria Del Rosario Aranda OT at 2019 12:14 PM          Acute Care - Occupational Therapy Initial Evaluation  [facility]     Patient Name: Mary Lou Graves  : 1935  MRN: [id number]  Today's Date: 2019  Onset of Illness/Injury or Date of Surgery: 19  Date of Referral to OT: 19  Referring Physician: Servando    Admit Date: 2019       ICD-10-CM ICD-9-CM   1. Failure to thrive in adult R62.7 783.7   2. Dehydration E86.0 276.51   3. Pleural effusion, bilateral J90 511.9   4. Dementia with behavioral disturbance, unspecified dementia type F03.91 294.21     Patient Active Problem List   Diagnosis   • Acute UTI   • Failure to thrive in adult     Past  Medical History:   Diagnosis Date   • Dementia    • Depression    • Hyperlipidemia    • Hypertension      Past Surgical History:   Procedure Laterality Date   • HYSTERECTOMY            OT ASSESSMENT FLOWSHEET (last 72 hours)      Occupational Therapy Evaluation     Row Name 02/12/19 1201                   OT Evaluation Time/Intention    Subjective Information  no complaints  -BF        Document Type  evaluation  -BF        Mode of Treatment  occupational therapy  -BF        Patient Effort  poor  -BF        Comment  Pt has dementia and has difficulty with following directions and functionally participating with OT. OT will follow pt for therapy pending on pt's ability to participate.  -BF           General Information    Patient Profile Reviewed?  yes  -BF        Onset of Illness/Injury or Date of Surgery  02/08/19  -BF        Referring Physician  Servando  -BF        Patient Observations  alert;cooperative;agree to therapy  -BF        Patient/Family Observations  Pt supine in bed, awake & alert this date  -BF        Prior Level of Function  -- Pt poor historian  -BF        Equipment Currently Used at Home  -- Pt poor historian, unable to provide  -BF        Pertinent History of Current Functional Problem  Pt has dementia  -BF        Existing Precautions/Restrictions  fall;other (see comments) cognition  -BF        Limitations/Impairments  safety/cognitive  -BF        Barriers to Rehab  cognitive status;previous functional deficit  -BF           Cognitive Assessment/Intervention- PT/OT    Orientation Status (Cognition)  unable/difficult to assess  -BF        Follows Commands (Cognition)  0-24% accuracy;verbal cues/prompting required;repetition of directions required;physical/tactile prompts required  -BF        Safety Deficit (Cognitive)  ability to follow commands;awareness of need for assistance;insight into deficits/self awareness;safety precautions awareness;safety precautions follow-through/compliance  -BF            ADL Assessment/Intervention    BADL Assessment/Intervention  bathing;upper body dressing;lower body dressing;grooming;feeding;toileting  -BF           Bathing Assessment/Intervention    Comment (Bathing)  Total A  -BF           Upper Body Dressing Assessment/Training    Comment (Upper Body Dressing)  Total A  -BF           Lower Body Dressing Assessment/Training    Comment (Lower Body Dressing)  Total A  -BF           Grooming Assessment/Training    Comment (Grooming)  Max A  -BF           Self-Feeding Assessment/Training    Comment (Feeding)  Min A  -BF           Toileting Assessment/Training    Comment (Toileting)  Total A  -BF           BADL Safety/Performance    Impairments, BADL Safety/Performance  cognition  -BF           General ROM    GENERAL ROM COMMENTS  Unable to follow directions for ROM; BUE observed to be WFL  -BF           MMT (Manual Muscle Testing)    General MMT Comments  Unable to follow directions  -BF           Motor Assessment/Interventions    Additional Documentation  Fine Motor Testing & Training (Group)  -BF           Fine Motor Testing & Training    Comment, Fine Motor Coordination  Impaired FMC  -BF           Positioning and Restraints    Post Treatment Position  bed  -BF        In Bed  supine;call light within reach;encouraged to call for assist;exit alarm on;with nsg w/ sitter  -BF           Plan of Care Review    Plan of Care Reviewed With  patient  -BF           Clinical Impression (OT)    Date of Referral to OT  02/11/19  -BF        OT Diagnosis  Debility  -BF        Patient/Family Goals Statement (OT Eval)  Unable to verbalize  -BF        Criteria for Skilled Therapeutic Interventions Met (OT Eval)  yes  -BF        Rehab Potential (OT Eval)  fair, will monitor progress closely  -BF        Predicted Duration of Therapy Intervention (Therapy Eval)  1 week  -BF        Anticipated Equipment Needs at Discharge (OT)  -- TBD  -BF        Anticipated Discharge Disposition (OT)  skilled  nursing facility;extended care facility  -BF           Planned OT Interventions    Planned Therapy Interventions (OT Eval)  activity tolerance training;BADL retraining;ROM/therapeutic exercise;strengthening exercise  -BF           OT Goals    Grooming Goal Selection (OT)  grooming, OT goal 1  -BF        Additional Documentation  Grooming Goal Selection (OT) (Row)  -BF           Grooming Goal 1 (OT)    Activity/Device (Grooming Goal 1, OT)  grooming skills, all  -BF        [place] (Grooming Goal 1, OT)  minimum assist (75% or more patient effort)  -BF        Time Frame (Grooming Goal 1, OT)  long term goal (LTG);by discharge  -BF           OT Cognitive Goals    Orientation Goal Selection (OT)  orientation, OT goal 1  -BF           Orientation Goal 1 (OT)    Activity (Orientation Goal 1, OT)  oriented to person  -BF        [place]/Cues/Accuracy (Orientation Goal 1, OT)  independently  -BF        Time Frame (Orientation Goal 1, OT)  long term goal (LTG);by discharge  -BF          User Key  (r) = Recorded By, (t) = Taken By, (c) = Cosigned By    Initials Name Effective Dates    BF Maria Del Rosario Aranda, OT 04/03/18 -                OT Recommendation and Plan  Outcome Summary/Treatment Plan (OT)  Anticipated Equipment Needs at Discharge (OT): (TBD)  Anticipated Discharge Disposition (OT): skilled nursing facility, extended care facility  Planned Therapy Interventions (OT Eval): activity tolerance training, BADL retraining, ROM/therapeutic exercise, strengthening exercise  Plan of Care Review  Plan of Care Reviewed With: patient  Plan of Care Reviewed With: patient    Outcome Measures     Row Name 02/12/19 1213 02/11/19 1100          How much help from another person do you currently need...    Turning from your back to your side while in flat bed without using bedrails?  --  2  -CT     Moving from lying on back to sitting on the side of a flat bed without bedrails?  --  2  -CT     Moving to and from a bed to a  chair (including a wheelchair)?  --  2  -CT     Standing up from a chair using your arms (e.g., wheelchair, bedside chair)?  --  2  -CT     Climbing 3-5 steps with a railing?  --  1  -CT     To walk in hospital room?  --  2  -CT     AM-PAC 6 Clicks Score  --  11  -CT        How much help from another is currently needed...    Putting on and taking off regular lower body clothing?  1  -BF  --     Bathing (including washing, rinsing, and drying)  1  -BF  --     Toileting (which includes using toilet bed pan or urinal)  1  -BF  --     Putting on and taking off regular upper body clothing  1  -BF  --     Taking care of personal grooming (such as brushing teeth)  2  -BF  --     Eating meals  3  -BF  --     Score  9  -BF  --        Functional Assessment    Outcome Measure Options  AM-PAC 6 Clicks Daily Activity (OT)  -BF  AM-PAC 6 Clicks Basic Mobility (PT)  -CT       User Key  (r) = Recorded By, (t) = Taken By, (c) = Cosigned By    Initials Name Provider Type     Maria Del Rosario Aranda OT Occupational Therapist    CT Anu Cabrera, RYAN Physical Therapist          Time Calculation:   Time Calculation- OT     Row Name 02/12/19 1213             Time Calculation- OT    Total Timed Code Minutes- OT  40 minute(s)  -BF      OT Non-Billable Time (min)  20 min  -BF        User Key  (r) = Recorded By, (t) = Taken By, (c) = Cosigned By    Initials Name Provider Type     Maria Del Rosario Aranda OT Occupational Therapist          Therapy Charges for Today     Code Description Service Date Service Provider Modifiers Qty    [id number]  OT EVAL HIGH COMPLEXITY 3 2/12/2019 Maria Del Rosario Aranda OT GO 1               Maria Del Rosario Aranda OT  2/12/2019    Electronically signed by Maria Del Rosario Aranda OT at 2/12/2019 12:14 PM     Maria Del Rosario Aranda OT at 2/11/2019  5:02 PM             02/11/19 1701   Rehab Time/Intention   Evaluation Not Performed other (see comments)  (Pt unable to functionally participate with OT on  this date, attempt in AM & PM. OT to attempt evaluation again trmw. )   Rehab Treatment   Discipline occupational therapist   Recommendation   OT - Next Appointment 02/12/19       Electronically signed by Maria Del Rosario Aranda OT at 2/11/2019  5:02 PM

## 2019-02-12 NOTE — THERAPY TREATMENT NOTE
Acute Care - Physical Therapy Treatment Note   [place]     Patient Name: Mary Lou Graves  : 1935  MRN: [id number]  Today's Date: 2019  Onset of Illness/Injury or Date of Surgery: 19  Date of Referral to PT: 19  Referring Physician: Servando    Admit Date: 2019    Visit Dx:    ICD-10-CM ICD-9-CM   1. Failure to thrive in adult R62.7 783.7   2. Dehydration E86.0 276.51   3. Pleural effusion, bilateral J90 511.9   4. Dementia with behavioral disturbance, unspecified dementia type F03.91 294.21     Patient Active Problem List   Diagnosis   • Acute UTI   • Failure to thrive in adult       Therapy Treatment    Rehabilitation Treatment Summary     Row Name 19 1517             Treatment Time/Intention    Discipline  physical therapist  -CT      Document Type  therapy note (daily note)  -CT      Mode of Treatment  individual therapy;physical therapy  -CT      Therapy Frequency (PT Clinical Impression)  3 times/wk 3-5 times/wk as available  -CT      Patient Effort  poor  -CT      Existing Precautions/Restrictions  fall  -CT      Patient Response to Treatment  Pt tolerated treatment session fair with rest breaks provided as needed. Pt ambulated with therapy with heavy verbal cueing and multiple attempts to initiate.   -CT      Recorded by [CT] Anu Cabrera, PT 19 1524      Row Name 19 1517             Cognitive Assessment/Intervention- PT/OT    Orientation Status (Cognition)  person;verbal cues/prompts needed for orientation first name only with heavy verbal cueing  -CT      Follows Commands (Cognition)  does not follow one step commands  -CT      Recorded by [CT] Anu Cabrera, PT 19 1524      Row Name 19 1517             Safety Issues, Functional Mobility    Impairments Affecting Function (Mobility)  cognition;balance;strength  -CT      Recorded by [CT] Anu Cabrera, PT 19 1524      Row Name 19 1517             Bed Mobility Assessment/Treatment     Bed Mobility Assessment/Treatment  bed mobility (all) activities  -CT      [place] Level (Bed Mobility)  moderate assist (50% patient effort);2 person assist;verbal cues;nonverbal cues (demo/gesture)  -CT      Bed Mobility, Safety Issues  cognitive deficits limit understanding  -CT      Recorded by [CT] Aun Cabrera, PT 02/12/19 1524      Row Name 02/12/19 1517             Transfer Assessment/Treatment    Transfer Assessment/Treatment  sit-stand transfer;stand-sit transfer  -CT      Recorded by [CT] Anu Cabrera, PT 02/12/19 1524      Row Name 02/12/19 1517             Sit-Stand Transfer    Sit-Stand [place] (Transfers)  moderate assist (50% patient effort);maximum assist (25% patient effort);2 person assist;verbal cues;nonverbal cues (demo/gesture)  -CT      Recorded by [CT] Anu Cabrera, PT 02/12/19 1524      Row Name 02/12/19 1517             Stand-Sit Transfer    Stand-Sit [place] (Transfers)  moderate assist (50% patient effort);maximum assist (25% patient effort);2 person assist;verbal cues;nonverbal cues (demo/gesture)  -CT      Recorded by [CT] Anu Cabrera, PT 02/12/19 1524      Row Name 02/12/19 1517             Gait/Stairs Assessment/Training    [place] Level (Gait)  maximum assist (25% patient effort);2 person assist;verbal cues;nonverbal cues (demo/gesture)  -CT      Assistive Device (Gait)  -- handheld assist  -CT      Distance in Feet (Gait)  20  -CT      Pattern (Gait)  step-to  -CT      Deviations/Abnormal Patterns (Gait)  base of support, narrow;peter decreased;festinating/shuffling;gait speed decreased;scissoring  -CT      Bilateral Gait Deviations  forward flexed posture;knee buckling, bilateral;weight shift ability decreased  -CT      Recorded by [CT] Anu Cabrera, PT 02/12/19 1524      Row Name 02/12/19 1517             Positioning and Restraints    Pre-Treatment Position  in bed  -CT      Post Treatment Position  bed  -CT      In Bed  supine;call light  within reach;encouraged to call for assist;exit alarm on;with family/caregiver;with nsg;side rails up x3;notified nsg  -CT      Recorded by [CT] RickAnu, PT 02/12/19 1524      Row Name 02/12/19 1517             Plan of Care Review    Plan of Care Reviewed With  patient  -CT      Recorded by [CT] RickAnu, PT 02/12/19 1524      Row Name 02/12/19 1517             Outcome Summary/Treatment Plan (PT)    Daily Summary of Progress (PT)  progress towards functional goals is fair  -CT      Anticipated Equipment Needs at Discharge (PT)  -- tbd  -CT      Anticipated Discharge Disposition (PT)  skilled nursing facility  -CT      Recorded by [CT] Anu Cabrera, PT 02/12/19 1524        User Key  (r) = Recorded By, (t) = Taken By, (c) = Cosigned By    Initials Name Effective Dates Discipline    CT Anu Cabrera, PT 04/03/18 -  PT               Rehab Goal Summary     Row Name 02/12/19 1201             Occupational Therapy Goals    Grooming Goal Selection (OT)  grooming, OT goal 1  -BF         Grooming Goal 1 (OT)    Activity/Device (Grooming Goal 1, OT)  grooming skills, all  -BF      [place] (Grooming Goal 1, OT)  minimum assist (75% or more patient effort)  -BF      Time Frame (Grooming Goal 1, OT)  long term goal (LTG);by discharge  -BF         Cognitive Goals (OT)    Orientation Goal Selection (OT)  orientation, OT goal 1  -BF         Orientation Goal 1 (OT)    Activity (Orientation Goal 1, OT)  oriented to person  -BF      [place]/Cues/Accuracy (Orientation Goal 1, OT)  independently  -BF      Time Frame (Orientation Goal 1, OT)  long term goal (LTG);by discharge  -BF        User Key  (r) = Recorded By, (t) = Taken By, (c) = Cosigned By    Initials Name Provider Type Discipline    BF Maria Del Rosario Aranda OT Occupational Therapist OT          Physical Therapy Education     Title: PT OT SLP Therapies (In Progress)     Topic: Physical Therapy (In Progress)     Point: Mobility training (In  Progress)     Learning Progress Summary           Patient Acceptance, E,TB, NL by CT at 2/12/2019  3:24 PM    Acceptance, E, VU by BB at 2/12/2019  9:46 AM    Acceptance, E, NL by  at 2/12/2019  1:13 AM    Acceptance, E,TB, NL by CT at 2/11/2019 11:03 AM                   Point: Home exercise program (In Progress)     Learning Progress Summary           Patient Acceptance, E,TB, NL by CT at 2/12/2019  3:24 PM    Acceptance, E, VU by BB at 2/12/2019  9:46 AM    Acceptance, E, NL by SM at 2/12/2019  1:13 AM    Acceptance, E,TB, NL by CT at 2/11/2019 11:03 AM                   Point: Body mechanics (In Progress)     Learning Progress Summary           Patient Acceptance, E,TB, NL by CT at 2/12/2019  3:24 PM    Acceptance, E, VU by BB at 2/12/2019  9:46 AM    Acceptance, E, NL by  at 2/12/2019  1:13 AM    Acceptance, E,TB, NL by CT at 2/11/2019 11:03 AM                   Point: Precautions (In Progress)     Learning Progress Summary           Patient Acceptance, E,TB, NL by CT at 2/12/2019  3:24 PM    Acceptance, E, VU by BB at 2/12/2019  9:46 AM    Acceptance, E, NL by  at 2/12/2019  1:13 AM    Acceptance, E,TB, NL by CT at 2/11/2019 11:03 AM                               User Key     Initials Effective Dates Name Provider Type Discipline    CT 04/03/18 -  Anu Cabrera, PT Physical Therapist PT     01/21/17 -  Conrado Schwartz, RN Registered Nurse Nurse     09/12/18 -  Hay Foster, LEYLA Registered Nurse Nurse                PT Recommendation and Plan  Anticipated Discharge Disposition (PT): skilled nursing facility  Planned Therapy Interventions (PT Eval): balance training, bed mobility training, gait training, manual therapy techniques, patient/family education, postural re-education, strengthening, transfer training  Therapy Frequency (PT Clinical Impression): 3 times/wk(3-5 times/wk as available)  Outcome Summary/Treatment Plan (PT)  Daily Summary of Progress (PT): progress towards functional goals is  fair  Anticipated Equipment Needs at Discharge (PT): (tbd)  Anticipated Discharge Disposition (PT): skilled nursing facility  Plan of Care Reviewed With: patient  Progress: no change  Outcome Measures     Row Name 02/12/19 1500 02/12/19 1213 02/11/19 1100       How much help from another person do you currently need...    Turning from your back to your side while in flat bed without using bedrails?  2  -CT  --  2  -CT    Moving from lying on back to sitting on the side of a flat bed without bedrails?  2  -CT  --  2  -CT    Moving to and from a bed to a chair (including a wheelchair)?  2  -CT  --  2  -CT    Standing up from a chair using your arms (e.g., wheelchair, bedside chair)?  2  -CT  --  2  -CT    Climbing 3-5 steps with a railing?  1  -CT  --  1  -CT    To walk in hospital room?  2  -CT  --  2  -CT    AM-PAC 6 Clicks Score  11  -CT  --  11  -CT       How much help from another is currently needed...    Putting on and taking off regular lower body clothing?  --  1  -BF  --    Bathing (including washing, rinsing, and drying)  --  1  -BF  --    Toileting (which includes using toilet bed pan or urinal)  --  1  -BF  --    Putting on and taking off regular upper body clothing  --  1  -BF  --    Taking care of personal grooming (such as brushing teeth)  --  2  -BF  --    Eating meals  --  3  -BF  --    Score  --  9  -BF  --       Functional Assessment    Outcome Measure Options  AM-PAC 6 Clicks Basic Mobility (PT)  -CT  AM-PAC 6 Clicks Daily Activity (OT)  -BF  AM-PAC 6 Clicks Basic Mobility (PT)  -CT      User Key  (r) = Recorded By, (t) = Taken By, (c) = Cosigned By    Initials Name Provider Type    Maria Del Rosario Funk OT Occupational Therapist    Anu Vences PT Physical Therapist         Time Calculation:   PT Charges     Row Name 02/12/19 1525             Time Calculation    PT Received On  02/12/19  -CT      PT - Next Appointment  02/13/19  -CT      PT Goal Re-Cert Due Date  02/25/19  -CT          Time Calculation- PT    Total Timed Code Minutes- PT  24 minute(s)  -CT         Timed Charges    92289 - Gait Training Minutes   10  -CT      39739 - PT Therapeutic Activity Minutes  14  -CT        User Key  (r) = Recorded By, (t) = Taken By, (c) = Cosigned By    Initials Name Provider Type    CT Anu Cabrera, PT Physical Therapist          Therapy Charges for Today     Code Description Service Date Service Provider Modifiers Qty    43921498841 HC PT EVAL HIGH COMPLEXITY 2 2/11/2019 Anu Cabrera, PT GP 1    91225025665 HC PT THERAPEUTIC ACT EA 15 MIN 2/11/2019 Anu Cabrera, PT GP 2    08341546867 HC PT THER SUPP EA 15 MIN 2/11/2019 Anu Cabrera, PT GP 4    93541980568 HC GAIT TRAINING EA 15 MIN 2/12/2019 Anu Cabrera, PT GP 1    39961075534 HC PT THERAPEUTIC ACT EA 15 MIN 2/12/2019 Anu Cabrera, PT GP 1    47842027044 HC PT THER SUPP EA 15 MIN 2/12/2019 Anu Cabrera, PT GP 2          PT G-Codes  Outcome Measure Options: AM-PAC 6 Clicks Basic Mobility (PT)  AM-PAC 6 Clicks Score: 11  Score: 9    Anu Cabrera PT  2/12/2019

## 2019-02-12 NOTE — PLAN OF CARE
Problem: Fall Risk (Adult)  Goal: Identify Related Risk Factors and Signs and Symptoms  Outcome: Ongoing (interventions implemented as appropriate)    Goal: Absence of Fall  Outcome: Ongoing (interventions implemented as appropriate)      Problem: Skin Injury Risk (Adult)  Goal: Identify Related Risk Factors and Signs and Symptoms  Outcome: Ongoing (interventions implemented as appropriate)    Goal: Skin Health and Integrity  Outcome: Ongoing (interventions implemented as appropriate)      Problem: Cognitive Impairment (IRF) (Adult)  Goal: Optimal/Safe Level of Will Related to Cognitive Impairment  Outcome: Ongoing (interventions implemented as appropriate)    Goal: Optimal Quality of Life in Relation to Cognitive Impairments  Outcome: Ongoing (interventions implemented as appropriate)      Problem: Cardiac Output Decreased (Adult)  Goal: Identify Related Risk Factors and Signs and Symptoms  Outcome: Ongoing (interventions implemented as appropriate)    Goal: Effective Tissue Perfusion  Outcome: Ongoing (interventions implemented as appropriate)      Problem: Urinary Tract Infection (Adult)  Goal: Signs and Symptoms of Listed Potential Problems Will be Absent, Minimized or Managed (Urinary Tract Infection)  Outcome: Ongoing (interventions implemented as appropriate)      Problem: Patient Care Overview  Goal: Plan of Care Review  Outcome: Ongoing (interventions implemented as appropriate)    Goal: Individualization and Mutuality  Outcome: Ongoing (interventions implemented as appropriate)    Goal: Discharge Needs Assessment  Outcome: Ongoing (interventions implemented as appropriate)    Goal: Interprofessional Rounds/Family Conf  Outcome: Ongoing (interventions implemented as appropriate)      Problem: Mobility, Physical Impaired (Adult)  Goal: Identify Related Risk Factors and Signs and Symptoms  Outcome: Ongoing (interventions implemented as appropriate)    Goal: Enhanced Mobility Skills  Outcome: Ongoing  (interventions implemented as appropriate)    Goal: Enhanced Functional Ability  Outcome: Ongoing (interventions implemented as appropriate)

## 2019-02-12 NOTE — PLAN OF CARE
Problem: Patient Care Overview  Goal: Plan of Care Review  Outcome: Ongoing (interventions implemented as appropriate)   02/12/19 2980   Coping/Psychosocial   Plan of Care Reviewed With patient   Plan of Care Review   Progress no change

## 2019-02-12 NOTE — DISCHARGE PLACEMENT REQUEST
"Mary Lou Graves (83 y.o. Female)     Date of Birth Social Security Number Address Home Phone MRN    1935  [address]  [place] [id number] [phone] [id number]    Latter day Marital Status          None        Admission Date Admission Type Admitting Provider Attending Provider Department, Room/Bed    2/8/19 Emergency Nicole Valdes MD Kfoury, Wajdi Samir, MD [address], 3306/2S    Discharge Date Discharge Disposition Discharge Destination                       Attending Provider:  Nicole Valdes MD    Allergies:  No Known Allergies    Isolation:  None   Infection:  None   Code Status:  CPR    Ht:  165.1 cm (65\")   Wt:  37 kg (81 lb 8 oz)    Admission Cmt:  None   Principal Problem:  None                Active Insurance as of 2/8/2019     Primary Coverage     Payor Plan Insurance Group Employer/Plan Group    MEDICARE MEDICARE A & B      Payor Plan Address Payor Plan Phone Number Payor Plan Fax Number Effective Dates    [address] [phone]  1/1/1989 - None Entered    [place] [address]       Subscriber Name Subscriber Birth Date Member ID       MARY LOU GRAVES 1935 [id number]                 Emergency Contacts      (Rel.) Home Phone Work Phone Mobile Phone    LEONIDES GRAVES (Son) [phone] -- [phone]    RACHAEL GRAVES (Spouse) [phone] -- [phone]            Emergency Contact Information     Name Relation Home Work Mobile    LEONIDES GRAVES Son [phone]    RACHAEL GRAVES Spouse [phone]          Insurance Information                MEDICARE/MEDICARE A & B Phone: [phone]    Subscriber: Mary Lou Graves Subscriber#: [id number]    Group#:  Precert#:           Treatment Team     Provider Relationship Specialty Contact    Nicole Valdes MD Attending, Consulting Physician Infectious Diseases  [phone]    Conrado Schwartz, RN Registered Nurse --      Gurwinder Nettles MD Consulting " Physician Nephrology  [phone]    Po Verma, OLGA LIDIA Respiratory Therapist --      Anu Cabrera PT Physical Therapist Physical Therapy            Problem List           Codes Noted - Resolved       Hospital    Failure to thrive in adult ICD-10-CM: R62.7  ICD-9-CM: 783.7 2019 - Present       Non-Hospital    Acute UTI ICD-10-CM: N39.0  ICD-9-CM: 599.0 2018 - Present             History & Physical      Fernanda Al PA-C at 2019 10:30 AM                   HISTORY AND PHYSICAL        Patient Identification:  Name:  Mary Lou Graves  Age:  83 y.o.  Sex:  female  :  1935  MRN:  [id number]   Visit Number:  [id number]  Primary Care Physician:  Provider, No Known       Subjective     Subjective     Chief complaint:     Chief Complaint   Patient presents with   • Dementia     PATIENT WAS SEEN BY FAMILY PHYSICIAN THIS AM AND WOULD NOT GET OUT OF THE CAR. THE PLAN WAS TO ECVALUATE HER FOR NURSING HOME PLACEMENT DUE TO WORSENING DEMENTIA.       History of presenting illness:     The patient is an 83 year old female who presented to the ED on 19 with worsening disorientation and decrease PO intake. The patient seems to be suffering from failure to thrive. The patients son reported he is no longer able to take care of the patient due to her worsening condition.  were contacted and recommend admission to observation unit for further evaluation and possible nursing home placement. Workup in the ED was essentially negative other than worsening BNP at 815.0 and chest xray showing bibasilar effusions and minimal bibasilar airspace disease. Upon exam this morning she was fairly comfortable and pleasantly confused. Sitter at bedside. Denies any complaints but a very poor historian due to confusion.       ---------------------------------------------------------------------------------------------------------------------     Review Of Systems:    Unable to obtain due to the patients  decreased mental status.   ---------------------------------------------------------------------------------------------------------------------     Past Medical History    Past Medical History:   Diagnosis Date   • Dementia    • Depression    • Hyperlipidemia    • Hypertension        Past Surgical History    Past Surgical History:   Procedure Laterality Date   • HYSTERECTOMY         Family History    History reviewed. No pertinent family history.    Social History    Social History     Tobacco Use   • Smoking status: Current Every Day Smoker     Packs/day: 0.50   • Smokeless tobacco: Never Used   Substance Use Topics   • Alcohol use: No     Frequency: Never     Comment: denies   • Drug use: No     Comment: denies       Allergies    Patient has no known allergies.  ---------------------------------------------------------------------------------------------------------------------     Home Medications:    Prior to Admission Medications     Prescriptions Last Dose Informant Patient Reported? Taking?    amLODIPine (NORVASC) 5 MG tablet 2/7/2019 Child Yes Yes    Take 5 mg by mouth Daily.    aspirin 81 MG chewable tablet 2/7/2019 Child Yes Yes    Chew 81 mg Daily.    carBAMazepine (TEGretol) 200 MG tablet 2/7/2019 Child Yes Yes    Take 200 mg by mouth 2 (Two) Times a Day.    dabigatran etexilate (PRADAXA) 150 MG capsu 2/7/2019 Child Yes Yes    Take 150 mg by mouth 2 (Two) Times a Day.    ibuprofen (ADVIL,MOTRIN) 400 MG tablet 2/7/2019 Child Yes Yes    Take 400 mg by mouth Every 6 (Six) Hours As Needed for Mild Pain .    mirtazapine (REMERON SOL-TAB) 15 MG disintegrating tablet 2/7/2019 Child Yes Yes    Take 15 mg by mouth Every Night.    nicotine (NICODERM CQ) 14 MG/24HR patch 2/7/2019 Child Yes Yes    Place 1 patch on the skin as directed by provider Daily.    polyethyl glycol-propyl glycol (SYSTANE) 0.4-0.3 % solution ophthalmic solution 2/7/2019 Child Yes Yes    Administer 1 drop to both eyes 3 (Three) Times a Day.     QUEtiapine (SEROquel) 100 MG tablet 2/7/2019 Child Yes Yes    Take 150 mg by mouth Every Night.    QUEtiapine (SEROquel) 50 MG tablet 2/7/2019 Child Yes Yes    Take 50 mg by mouth Every Morning.    sertraline (ZOLOFT) 100 MG tablet 2/7/2019 Child Yes Yes    Take 100 mg by mouth Daily.        ---------------------------------------------------------------------------------------------------------------------    Objective     Objective     Hospital Scheduled Meds:    amLODIPine 5 mg Oral Daily   aspirin 81 mg Oral Daily   carBAMazepine 200 mg Oral BID   dabigatran etexilate 150 mg Oral BID   mirtazapine 15 mg Oral Nightly   nicotine 1 patch Transdermal Q24H   potassium chloride 40 mEq Oral Q4H   QUEtiapine 150 mg Oral Nightly   QUEtiapine 50 mg Oral Daily   sertraline 100 mg Oral Daily   sodium chloride 3 mL Intravenous Q12H        ---------------------------------------------------------------------------------------------------------------------   Vital Signs:  Temp:  [97.7 °F (36.5 °C)-98.7 °F (37.1 °C)] 98.7 °F (37.1 °C)  Heart Rate:  [59-66] 66  Resp:  [18-20] 18  BP: (112-160)/(68-74) 160/69  Mean Arterial Pressure (Non-Invasive) for the past 24 hrs (Last 3 readings):   Noninvasive MAP (mmHg)   02/09/19 0800 94   02/09/19 0405 85   02/09/19 0000 110     SpO2 Percentage    02/09/19 0253 02/09/19 0405 02/09/19 0800   SpO2: 95% 93% 95%     SpO2:  [93 %-97 %] 95 %  on   ;   Device (Oxygen Therapy): room air    Body mass index is 20.3 kg/m².  Wt Readings from Last 3 Encounters:   02/08/19 55.3 kg (122 lb)   12/21/18 44 kg (97 lb)   12/17/18 44 kg (97 lb)     ---------------------------------------------------------------------------------------------------------------------     Physical Exam:    Constitutional:  Well-developed and well-nourished.  No respiratory distress. Calm. Confused.      HENT:  Head: Normocephalic and atraumatic.  Mouth:  Moist mucous membranes.    Eyes:  Conjunctivae and EOM are normal.  No  scleral icterus.  Neck:  Neck supple.  No JVD present.    Cardiovascular:  Normal rate, regular rhythm and normal heart sounds with no murmur. No edema.  Pulmonary/Chest:  No respiratory distress, no wheezes, no crackles, with normal breath sounds and good air movement.  Abdominal:  Soft.  Bowel sounds are normal.  No distension and no tenderness.   Musculoskeletal:  No edema, no tenderness, and no deformity.  No swelling or redness of joints.  Neurological:  Disoriented.  No facial droop.  No slurred speech.   Skin:  Skin is warm and dry.  No rash noted.  No pallor.   Psychiatric:  Normal mood and affect.  Behavior is normal.    ---------------------------------------------------------------------------------------------------------------------  I have personally reviewed the EKG/Telemetry strip  ---------------------------------------------------------------------------------------------------------------------         Results from last 7 days   Lab Units 02/09/19  0038   CHOLESTEROL mg/dL 189   TRIGLYCERIDES mg/dL 159*   HDL CHOL mg/dL 26*   LDL CHOL mg/dL 131*       Results from last 7 days   Lab Units 02/09/19  0038 02/08/19  1640   WBC 10*3/mm3 8.38 9.81   HEMOGLOBIN g/dL 11.5* 10.8*   HEMATOCRIT % 35.5* 33.3*   MCV fL 89.4 89.0   MCHC g/dL 32.4* 32.4*   PLATELETS 10*3/mm3 284 251     Results from last 7 days   Lab Units 02/09/19  0038 02/08/19  1640   SODIUM mmol/L 140 137   POTASSIUM mmol/L 3.5 3.8   CHLORIDE mmol/L 101 105   CO2 mmol/L 28.2 23.9*   BUN mg/dL 19 22*   CREATININE mg/dL 0.65 0.66   EGFR IF NONAFRICN AM mL/min/1.73 87 86   CALCIUM mg/dL 8.6 8.6   GLUCOSE mg/dL 95 106   ALBUMIN g/dL 3.80 3.60   BILIRUBIN mg/dL 0.5 0.6   ALK PHOS U/L 100 96   AST (SGOT) U/L 50* 49*   ALT (SGPT) U/L 54* 56*   Estimated Creatinine Clearance: 48.1 mL/min (by C-G formula based on SCr of 0.65 mg/dL).  No results found for: AMMONIA    No results found for: HGBA1C, POCGLU  No results found for: HGBA1C  Lab Results    Component Value Date    TSH 4.532 02/09/2019                      Pain Management Panel     Pain Management Panel Latest Ref Rng & Units 2/8/2019 12/22/2018    AMPHETAMINES SCREEN, URINE Negative Negative Negative    BARBITURATES SCREEN Negative Negative Negative    BENZODIAZEPINE SCREEN, URINE Negative Negative Negative    BUPRENORPHINE Negative Negative Negative    COCAINE SCREEN, URINE Negative Negative Negative    METHADONE SCREEN, URINE Negative Negative Negative        I have personally reviewed the above laboratory results.   ---------------------------------------------------------------------------------------------------------------------  Imaging Results (last 7 days)     Procedure Component Value Units Date/Time    XR Chest 1 View [[id number]] Collected:  02/08/19 1616     Updated:  02/08/19 1620    Narrative:       XR CHEST 1 VW-     CLINICAL INDICATION: disoriented          COMPARISON: 12/22/2018      TECHNIQUE: Single frontal view of the chest.     FINDINGS:     Bibasilar effusions and minimal bibasilar airspace disease  The cardiac silhouette is normal. The pulmonary vasculature is  unremarkable.  There is no evidence of an acute osseous abnormality.   There are no suspicious-appearing parenchymal soft tissue nodules.            Impression:       Bibasilar effusions and minimal bibasilar airspace disease         This report was finalized on 2/8/2019 4:18 PM by Dr. Yanick Ford MD.           I have personally reviewed the above radiology results.   ---------------------------------------------------------------------------------------------------------------------      Assessment & Plan        Assessment/Plan       ASSESSMENT:    1. Failure to thrive  2. Social issues  3. CHF exacerbation    PLAN:    The patient is an 83 year old female who presented to the ED on 2/9/19 with worsening disorientation and decrease PO intake. The patient seems to be suffering from failure to thrive. The patients son  reported he is no longer able to take care of the patient due to her worsening condition.  were contacted and recommend admission to observation unit for further evaluation and possible nursing home placement. Workup in the ED was essentially negative other than worsening BNP at 815.0 and chest xray showing bibasilar effusions and minimal bibasilar airspace disease. Upon exam this morning she was fairly comfortable and pleasantly confused. Sitter at bedside. Denies any complaints but a very poor historian due to confusion.     Echo ordered. Lasix 20mg IV x once given.    Patient evaluated with her nurse Nisha.     Sitter at bedside.     SCDs for DVT prophylaxis.      working on possible nursing home placement. Will make inpatient as the patient is going to require a longer stay.     Patient's findings and recommendations were discussed with patient and nursing staff      Code Status:   Code Status and Medical Interventions:   Ordered at: 02/08/19 2058     Code Status:    CPR     Medical Interventions (Level of Support Prior to Arrest):    Full           Fernanda Al PA-C  02/09/19  10:32 AM      Electronically signed by Nicole Valdes MD at 2/11/2019  5:07 AM       ICU Vital Signs     Row Name 02/12/19 0715 02/12/19 0344 02/11/19 2017 02/11/19 1815 02/11/19 1448       Height and Weight    Weight  --  37 kg (81 lb 8 oz)  --  --  --    Weight Method  --  Bed scale  --  --  --       Vitals    Temp  --  98 °F (36.7 °C)  --  96.6 °F (35.9 °C)  97.4 °F (36.3 °C)    Temp src  --  Oral  --  Axillary  Axillary    Pulse  --  70  --  70  --    Heart Rate Source  --  Monitor  --  Monitor  Monitor    Resp  --  18  --  18  18    Resp Rate Source  --  Visual  --  --  Visual    BP  --  128/69  --  131/68  150/90    Noninvasive MAP (mmHg)  --  --  --  --  112    BP Location  --  Left arm  --  Left arm  Right arm    BP Method  --  Automatic  --  Automatic  Automatic    Patient Position  --   Lying  --  Lying  Lying       Oxygen Therapy    SpO2  --  97 %  --  91 %  95 %    Pulse Oximetry Type  --  Continuous  --  Continuous  --    Device (Oxygen Therapy)  room air  room air  room air  room air  --        Intake & Output (last day)       02/11 0701 - 02/12 0700 02/12 0701 - 02/13 0700    P.O. 240     Total Intake(mL/kg) 240 (6.5)     Urine (mL/kg/hr) 100 (0.1)     Total Output 100     Net +140               Lines, Drains & Airways    Active LDAs     Name:   Placement date:   Placement time:   Site:   Days:    Midline Catheter - Single Lumen 02/11/19 Left Basilic   02/11/19 0936     1    Peripheral IV 02/09/19 0930 Anterior;Left;Upper Arm   02/09/19 0930    Arm   3                Hospital Medications (active)       Dose Frequency Start End    acetaminophen (TYLENOL) tablet 650 mg 650 mg Every 4 Hours PRN 2/8/2019     Sig - Route: Take 2 tablets by mouth Every 4 (Four) Hours As Needed for Mild Pain . - Oral    Cosign for Ordering: Accepted by Nicole Valdes MD on 2/11/2019  5:12 AM    amLODIPine (NORVASC) tablet 5 mg 5 mg Daily 2/9/2019     Sig - Route: Take 1 tablet by mouth Daily. - Oral    aspirin chewable tablet 81 mg 81 mg Daily 2/9/2019     Sig - Route: Chew 1 tablet Daily. - Oral    carBAMazepine (TEGretol) tablet 200 mg 200 mg 2 Times Daily 2/9/2019     Sig - Route: Take 1 tablet by mouth 2 (Two) Times a Day. - Oral    carboxymethylcellulose (REFRESH PLUS) 0.5 % ophthalmic solution 1 drop 1 drop 3 Times Daily PRN 2/8/2019     Sig - Route: Administer 1 drop to both eyes 3 (Three) Times a Day As Needed for Dry Eyes. - Both Eyes    dabigatran etexilate (PRADAXA) capsule 150 mg 150 mg 2 Times Daily 2/9/2019     Sig - Route: Take 1 capsule by mouth 2 (Two) Times a Day. - Oral    Magnesium Sulfate 2 gram / 50mL Infusion (GIVE X 3 BAGS TO EQUAL 6GM TOTAL DOSE) - Mg 1.1 - 1.5 mg/dl 2 g As Needed 2/9/2019     Sig - Route: Infuse 50 mL into a venous catheter As Needed (See Administration  "Instructions). - Intravenous    Cosign for Ordering: Accepted by Nicole Valdes MD on 2/11/2019  5:12 AM    Linked Group 1:  \"Or\" Linked Group Details        Magnesium Sulfate 2 gram Bolus, followed by 8 gram infusion (total Mg dose 10 grams)- Mg less than or equal to 1mg/dL 2 g As Needed 2/9/2019     Sig - Route: Infuse 50 mL into a venous catheter As Needed (See Administration Instructions). - Intravenous    Cosign for Ordering: Accepted by Nicole Valdes MD on 2/11/2019  5:12 AM    Linked Group 1:  \"Or\" Linked Group Details        Magnesium Sulfate 4 gram infusion- Mg 1.6-1.9 mg/dL 4 g As Needed 2/9/2019     Sig - Route: Infuse 100 mL into a venous catheter As Needed (See Administration Instructions). - Intravenous    Cosign for Ordering: Accepted by Nicole Valdes MD on 2/11/2019  5:12 AM    Linked Group 1:  \"Or\" Linked Group Details        mirtazapine (REMERON) tablet 15 mg 15 mg Nightly 2/9/2019     Sig - Route: Take 1 tablet by mouth Every Night. - Oral    nicotine (NICODERM CQ) 14 MG/24HR patch 1 patch 1 patch Every 24 Hours 2/9/2019     Sig - Route: Place 1 patch on the skin as directed by provider Daily. - Transdermal    nitroglycerin (NITROSTAT) SL tablet 0.4 mg 0.4 mg Every 5 Minutes PRN 2/8/2019     Sig - Route: Place 1 tablet under the tongue Every 5 (Five) Minutes As Needed for Chest Pain (Chest Pain With Systolic Blood Pressure Greater Than 100). - Sublingual    Cosign for Ordering: Accepted by Nicole Valdes MD on 2/11/2019  5:12 AM    ondansetron (ZOFRAN) injection 4 mg 4 mg Every 6 Hours PRN 2/8/2019     Sig - Route: Infuse 2 mL into a venous catheter Every 6 (Six) Hours As Needed for Nausea or Vomiting. - Intravenous    Cosign for Ordering: Accepted by Nicole Valdes MD on 2/11/2019  5:12 AM    Linked Group 2:  \"Or\" Linked Group Details        ondansetron (ZOFRAN) tablet 4 mg 4 mg Every 6 Hours PRN 2/8/2019     Sig - Route: Take 1 tablet by mouth Every 6 (Six) Hours " "As Needed for Nausea or Vomiting. - Oral    Cosign for Ordering: Accepted by Nicole Valdes MD on 2/11/2019  5:12 AM    Linked Group 2:  \"Or\" Linked Group Details        ondansetron ODT (ZOFRAN-ODT) disintegrating tablet 4 mg 4 mg Every 6 Hours PRN 2/8/2019     Sig - Route: Take 1 tablet by mouth Every 6 (Six) Hours As Needed for Nausea or Vomiting. - Oral    Cosign for Ordering: Accepted by Nicole Valdes MD on 2/11/2019  5:12 AM    Linked Group 2:  \"Or\" Linked Group Details        potassium chloride (KLOR-CON) packet 40 mEq 40 mEq As Needed 2/9/2019     Sig - Route: Take 40 mEq by mouth As Needed (potassium replacement, see admin instructions). - Oral    Cosign for Ordering: Accepted by Nicole Valdes MD on 2/11/2019  5:12 AM    Linked Group 3:  \"Or\" Linked Group Details        potassium chloride (MICRO-K) CR capsule 40 mEq 40 mEq As Needed 2/9/2019     Sig - Route: Take 4 capsules by mouth As Needed (potassium replacement.  see admin instructions). - Oral    Cosign for Ordering: Accepted by Nicole Valdes MD on 2/11/2019  5:12 AM    Linked Group 3:  \"Or\" Linked Group Details        potassium chloride 10 mEq in 100 mL IVPB 10 mEq Every 1 Hour PRN 2/9/2019     Sig - Route: Infuse 100 mL into a venous catheter Every 1 (One) Hour As Needed (potassium protocol PERIPHERAL - see admin instructions). - Intravenous    Cosign for Ordering: Accepted by Nicole Valdes MD on 2/11/2019  5:12 AM    Linked Group 3:  \"Or\" Linked Group Details        QUEtiapine (SEROquel) tablet 150 mg 150 mg Nightly 2/9/2019     Sig - Route: Take 150 mg by mouth Every Night. - Oral    QUEtiapine (SEROquel) tablet 50 mg 50 mg Daily 2/9/2019     Sig - Route: Take 2 tablets by mouth Daily. - Oral    sertraline (ZOLOFT) tablet 100 mg 100 mg Daily 2/9/2019     Sig - Route: Take 2 tablets by mouth Daily. - Oral    sodium chloride 0.9 % flush 10 mL 10 mL As Needed 2/8/2019     Sig - Route: Infuse 10 mL into a venous " "catheter As Needed for Line Care. - Intravenous    Cosign for Ordering: Accepted by Ajith Bill MD on 2/8/2019  5:23 PM    Linked Group 4:  \"And\" Linked Group Details        sodium chloride 0.9 % flush 10 mL 10 mL Every 12 Hours Scheduled 2/11/2019     Sig - Route: Infuse 10 mL into a venous catheter Every 12 (Twelve) Hours. - Intravenous    sodium chloride 0.9 % flush 10 mL 10 mL As Needed 2/11/2019     Sig - Route: Infuse 10 mL into a venous catheter As Needed for Line Care (After Medication Administration). - Intravenous    sodium chloride 0.9 % flush 3 mL 3 mL Every 12 Hours Scheduled 2/8/2019     Sig - Route: Infuse 3 mL into a venous catheter Every 12 (Twelve) Hours. - Intravenous    Cosign for Ordering: Accepted by Nicole Valdes MD on 2/11/2019  5:12 AM    sodium chloride 0.9 % flush 3-10 mL 3-10 mL As Needed 2/8/2019     Sig - Route: Infuse 3-10 mL into a venous catheter As Needed for Line Care. - Intravenous    Cosign for Ordering: Accepted by Nicole Valdes MD on 2/11/2019  5:12 AM            Lab Results (last 24 hours)     Procedure Component Value Units Date/Time    Comprehensive Metabolic Panel [[id number]]  (Abnormal) Collected:  02/12/19 0655    Specimen:  Blood Updated:  02/12/19 0744     Glucose 141 mg/dL      BUN 27 mg/dL      Creatinine 0.81 mg/dL      Sodium 136 mmol/L      Potassium 3.5 mmol/L      Chloride 101 mmol/L      CO2 28.4 mmol/L      Calcium 8.9 mg/dL      Total Protein 6.3 g/dL      Albumin 3.50 g/dL      ALT (SGPT) 37 U/L      AST (SGOT) 25 U/L      Alkaline Phosphatase 93 U/L      Comment: Note New Reference Ranges        Total Bilirubin 0.3 mg/dL      eGFR Non African Amer 68 mL/min/1.73      Globulin 2.8 gm/dL      A/G Ratio 1.3 g/dL      BUN/Creatinine Ratio 33.3     Anion Gap 6.6 mmol/L     Narrative:       The MDRD GFR formula is only valid for adults with stable renal function between ages 18 and 70.    Osmolality, Calculated [[id number]]  (Normal) Collected: "  02/12/19 0655    Specimen:  Blood Updated:  02/12/19 0744     Osmolality Calc 279.4 mOsm/kg     C-reactive Protein [[id number]]  (Abnormal) Collected:  02/12/19 0332    Specimen:  Blood Updated:  02/12/19 0605     C-Reactive Protein 2.08 mg/dL      Comment: 3+ Hemolysis         CBC & Differential [[id number]] Collected:  02/12/19 0333    Specimen:  Blood Updated:  02/12/19 0538    Narrative:       The following orders were created for panel order CBC & Differential.  Procedure                               Abnormality         Status                     ---------                               -----------         ------                     CBC Auto Differential[[id number]]        Abnormal            Final result                 Please view results for these tests on the individual orders.    CBC Auto Differential [[id number]]  (Abnormal) Collected:  02/12/19 0333    Specimen:  Blood Updated:  02/12/19 0538     WBC 12.48 10*3/mm3      RBC 3.97 10*6/mm3      Hemoglobin 11.3 g/dL      Hematocrit 35.4 %      MCV 89.2 fL      MCH 28.5 pg      MCHC 31.9 g/dL      RDW 13.8 %      RDW-SD 44.2 fl      MPV 10.3 fL      Platelets 336 10*3/mm3      Neutrophil % 84.0 %      Lymphocyte % 6.4 %      Monocyte % 8.5 %      Eosinophil % 0.6 %      Basophil % 0.2 %      Immature Grans % 0.3 %      Neutrophils, Absolute 10.47 10*3/mm3      Lymphocytes, Absolute 0.80 10*3/mm3      Monocytes, Absolute 1.06 10*3/mm3      Eosinophils, Absolute 0.08 10*3/mm3      Basophils, Absolute 0.03 10*3/mm3      Immature Grans, Absolute 0.04 10*3/mm3         Orders (last 24 hrs)     Start     Ordered    02/13/19 0600  Basic Metabolic Panel  Morning Draw      02/12/19 1255    02/13/19 0600  CBC & Differential  Morning Draw      02/12/19 1257    02/13/19 0600  Comprehensive Metabolic Panel  Morning Draw      02/12/19 1257    02/13/19 0600  C-reactive Protein  Morning Draw      02/12/19 1257    02/13/19 0600  BNP  Morning Draw      02/12/19 1257    02/12/19  1256  Sodium, Urine, Random - Urine, Clean Catch  Once      02/12/19 1255    02/12/19 1256  Creatinine, Urine, Random - Urine, Clean Catch  Once      02/12/19 1255    02/12/19 0954  Inpatient Nephrology Consult  Once     Specialty:  Nephrology  Provider:  Gurwinder Nettles MD    02/12/19 0954    02/12/19 0745  Osmolality, Calculated  Once      02/12/19 0744    02/12/19 0604  Comprehensive Metabolic Panel  Morning Draw      02/11/19 0945    02/12/19 0600  CBC & Differential  Morning Draw      02/11/19 0945    02/12/19 0600  C-reactive Protein  Morning Draw      02/11/19 0945    02/12/19 0600  CBC Auto Differential  PROCEDURE ONCE      02/12/19 0002    02/11/19 1030  sodium chloride 0.9 % flush 10 mL  Every 12 Hours Scheduled      02/11/19 0936    02/11/19 0935  sodium chloride 0.9 % flush 10 mL  As Needed      02/11/19 0936    02/09/19 1200  Dietary Nutrition Supplements Boost Plus (Ensure Enlive, Ensure Plus)  Daily With Breakfast, Lunch & Dinner      02/09/19 1158    02/09/19 0907  Magnesium Sulfate 2 gram Bolus, followed by 8 gram infusion (total Mg dose 10 grams)- Mg less than or equal to 1mg/dL  As Needed      02/09/19 0907    02/09/19 0907  Magnesium Sulfate 2 gram / 50mL Infusion (GIVE X 3 BAGS TO EQUAL 6GM TOTAL DOSE) - Mg 1.1 - 1.5 mg/dl  As Needed      02/09/19 0907    02/09/19 0907  Magnesium Sulfate 4 gram infusion- Mg 1.6-1.9 mg/dL  As Needed      02/09/19 0907    02/09/19 0907  potassium chloride (MICRO-K) CR capsule 40 mEq  As Needed      02/09/19 0907    02/09/19 0907  potassium chloride (KLOR-CON) packet 40 mEq  As Needed      02/09/19 0907    02/09/19 0907  potassium chloride 10 mEq in 100 mL IVPB  Every 1 Hour PRN      02/09/19 0907    02/09/19 0900  amLODIPine (NORVASC) tablet 5 mg  Daily      02/08/19 2219    02/09/19 0900  aspirin chewable tablet 81 mg  Daily      02/08/19 2219 02/09/19 0900  QUEtiapine (SEROquel) tablet 50 mg  Daily      02/08/19 2219 02/09/19 0900  sertraline (ZOLOFT) tablet  100 mg  Daily      02/08/19 2219 02/09/19 0900  nicotine (NICODERM CQ) 14 MG/24HR patch 1 patch  Every 24 Hours      02/08/19 2219 02/09/19 0000  mirtazapine (REMERON) tablet 15 mg  Nightly      02/08/19 2219 02/09/19 0000  dabigatran etexilate (PRADAXA) capsule 150 mg  2 Times Daily      02/08/19 2219 02/09/19 0000  carBAMazepine (TEGretol) tablet 200 mg  2 Times Daily      02/08/19 2219 02/09/19 0000  QUEtiapine (SEROquel) tablet 150 mg  Nightly      02/08/19 2219 02/08/19 2230  sodium chloride 0.9 % flush 3 mL  Every 12 Hours Scheduled      02/08/19 2132 02/08/19 2216  carboxymethylcellulose (REFRESH PLUS) 0.5 % ophthalmic solution 1 drop  3 Times Daily PRN      02/08/19 2219 02/08/19 2132  acetaminophen (TYLENOL) tablet 650 mg  Every 4 Hours PRN      02/08/19 2132 02/08/19 2132  ondansetron (ZOFRAN) tablet 4 mg  Every 6 Hours PRN      02/08/19 2132    02/08/19 2132  ondansetron ODT (ZOFRAN-ODT) disintegrating tablet 4 mg  Every 6 Hours PRN      02/08/19 2132 02/08/19 2132  ondansetron (ZOFRAN) injection 4 mg  Every 6 Hours PRN      02/08/19 2132    02/08/19 2132  sodium chloride 0.9 % flush 3-10 mL  As Needed      02/08/19 2132 02/08/19 2132  nitroglycerin (NITROSTAT) SL tablet 0.4 mg  Every 5 Minutes PRN      02/08/19 2132    02/08/19 1721  sodium chloride 0.9 % flush 10 mL  As Needed      02/08/19 1722    Unscheduled  ECG 12 Lead  As Needed     Comments:  Nurse to Release if Patient Expericences Acute Chest Pain or Dysrhythmias    02/08/19 2132    Unscheduled  Potassium  As Needed     Comments:  For Ventricular Arrhythmias      02/08/19 2132    Unscheduled  Magnesium  As Needed     Comments:  For Ventricular Arrhythmias      02/08/19 2132    Unscheduled  Troponin  As Needed     Comments:  For Chest Pain      02/08/19 2132    Unscheduled  Digoxin Level  As Needed     Comments:  For Atrial Arrhythmias      02/08/19 2132    Unscheduled  Blood Gas, Arterial  As Needed     Comments:   Per O2 PolicyNotify Physician      02/08/19 2132    Unscheduled  Up With Assistance  As Needed      02/08/19 2132    Unscheduled  Change Dressing to IV Site As Needed When Damp, Loose or Soiled  As Needed      02/09/19 0719    Unscheduled  Change Needleless Connectors  As Needed     Comments:  Change Needleless Connectors When:  - Removed For Any Reason  - Residual Blood or Debris Within Connector  - Prior to Drawing Blood Cultures  - Contamination of Connector  - After Administration of Blood or Blood Components    02/09/19 0719    Unscheduled  Magnesium  As Needed      02/09/19 0907    Unscheduled  Potassium  As Needed      02/09/19 0907    Unscheduled  Change Dressing to IV Site As Needed When Damp, Loose or Soiled  As Needed      02/11/19 0936    Unscheduled  Change Needleless Connectors  As Needed     Comments:  Change Needleless Connectors When:  - Removed For Any Reason  - Residual Blood or Debris Within Connector  - Prior to Drawing Blood Cultures  - Contamination of Connector  - After Administration of Blood or Blood Components    02/11/19 0936    --  amLODIPine (NORVASC) 5 MG tablet  Daily      02/08/19 1959    --  aspirin 81 MG chewable tablet  Daily      02/08/19 1959    --  mirtazapine (REMERON SOL-TAB) 15 MG disintegrating tablet  Nightly      02/08/19 1959    --  QUEtiapine (SEROquel) 50 MG tablet  Every Morning      02/08/19 1959    --  sertraline (ZOLOFT) 100 MG tablet  Daily      02/08/19 1959    --  dabigatran etexilate (PRADAXA) 150 MG capsu  2 Times Daily      02/08/19 2155    --  carBAMazepine (TEGretol) 200 MG tablet  2 Times Daily      02/08/19 2200    --  ibuprofen (ADVIL,MOTRIN) 400 MG tablet  Every 6 Hours PRN      02/08/19 2200    --  nicotine (NICODERM CQ) 14 MG/24HR patch  Every 24 Hours      02/08/19 2200    --  QUEtiapine (SEROquel) 100 MG tablet  Nightly      02/08/19 2200    --  polyethyl glycol-propyl glycol (SYSTANE) 0.4-0.3 % solution ophthalmic solution  3 Times Daily       19 2200          Operative/Procedure Notes (last 24 hours) (Notes from 2019  2:08 PM through 2019  2:08 PM)     No notes of this type exist for this encounter.           Physician Progress Notes (last 24 hours) (Notes from 2019  2:08 PM through 2019  2:08 PM)      Fernanda Al PA-C at 2019 12:45 PM                     PROGRESS NOTE         Patient Identification:  Name:  Mary Lou Graves  Age:  83 y.o.  Sex:  female  :  1935  MRN:  [id number]  Visit Number:  [id number]  Primary Care Provider:  London Merritt MD      ----------------------------------------------------------------------------------------------------------------------  Subjective       Chief Complaints:    Dementia (PATIENT WAS SEEN BY FAMILY PHYSICIAN THIS AM AND WOULD NOT GET OUT OF THE CAR. THE PLAN WAS TO ECVALUATE HER FOR NURSING HOME PLACEMENT DUE TO WORSENING DEMENTIA.)         Interval History:      The patient appeared comfortable this morning with LEYLA Camp at bedside. The patient is reported to have an uneventful night but has had a significant decrease in urine output. Nursing staff reports bladder scan performed and straight cathed with only 35cc in around 12 hours. Nephrology was consulted. The patient also continues to have poor oral intake. Unable to give fluids due to risk outweighing benefit in the setting of CHF.     Review of Systems:    Unable to obtain due to the patients confusion  ----------------------------------------------------------------------------------------------------------------------      Objective       Current [facility] Meds:    amLODIPine 5 mg Oral Daily   aspirin 81 mg Oral Daily   carBAMazepine 200 mg Oral BID   dabigatran etexilate 150 mg Oral BID   mirtazapine 15 mg Oral Nightly   nicotine 1 patch Transdermal Q24H   QUEtiapine 150 mg Oral Nightly   QUEtiapine 50 mg Oral Daily   sertraline 100 mg Oral Daily   sodium chloride 10 mL Intravenous Q12H   sodium  chloride 3 mL Intravenous Q12H        ----------------------------------------------------------------------------------------------------------------------    Vital Signs:  Temp:  [96.6 °F (35.9 °C)-98 °F (36.7 °C)] 98 °F (36.7 °C)  Heart Rate:  [70] 70  Resp:  [18] 18  BP: (128-150)/(68-90) 128/69  Mean Arterial Pressure (Non-Invasive) for the past 24 hrs (Last 3 readings):   Noninvasive MAP (mmHg)   02/11/19 1448 112     SpO2 Percentage    02/11/19 1448 02/11/19 1815 02/12/19 0344   SpO2: 95% 91% 97%     SpO2:  [91 %-97 %] 97 %  on   ;   Device (Oxygen Therapy): room air    Body mass index is 13.56 kg/m².  Wt Readings from Last 3 Encounters:   02/12/19 37 kg (81 lb 8 oz)   12/21/18 44 kg (97 lb)   12/17/18 44 kg (97 lb)        Intake/Output Summary (Last 24 hours) at 2/12/2019 1242  Last data filed at 2/12/2019 0343  Gross per 24 hour   Intake 240 ml   Output 100 ml   Net 140 ml     Diet Regular  ----------------------------------------------------------------------------------------------------------------------    Physical Exam:     Constitutional:  Well-developed and well-nourished.  No respiratory distress. Calm. Confused.      HENT:  Head: Normocephalic and atraumatic.  Mouth:  Moist mucous membranes.    Eyes:  Conjunctivae and EOM are normal.  No scleral icterus.  Neck:  Neck supple.  No JVD present.    Cardiovascular:  Normal rate, regular rhythm and normal heart sounds with no murmur. No edema.  Pulmonary/Chest:  No respiratory distress, no wheezes, no crackles, with normal breath sounds and good air movement.  Abdominal:  Soft.  Bowel sounds are normal.  No distension and no tenderness.   Musculoskeletal:  No edema, no tenderness, and no deformity.  No swelling or redness of joints.  Neurological:  Disoriented.  No facial droop.  No slurred speech.   Skin:  Skin is warm and dry.  No rash noted.  No pallor.   Psychiatric:  Normal mood and affect.  Behavior is  normal.    ----------------------------------------------------------------------------------------------------------------------  I have personally reviewed the EKG/Telemetry strips   ----------------------------------------------------------------------------------------------------------------------        Results from last 7 days   Lab Units 02/09/19 0038   CHOLESTEROL mg/dL 189   TRIGLYCERIDES mg/dL 159*   HDL CHOL mg/dL 26*   LDL CHOL mg/dL 131*       Results from last 7 days   Lab Units 02/12/19 0333 02/12/19 0332 02/11/19 0437 02/09/19 0038   CRP mg/dL  --  2.08* 2.57*  --    WBC 10*3/mm3 12.48  --  10.38 8.38   HEMOGLOBIN g/dL 11.3*  --  12.0 11.5*   HEMATOCRIT % 35.4*  --  36.9* 35.5*   MCV fL 89.2  --  89.1 89.4   MCHC g/dL 31.9*  --  32.5* 32.4*   PLATELETS 10*3/mm3 336  --  329 284     Results from last 7 days   Lab Units 02/12/19  0655 02/11/19 0437 02/10/19  0400 02/09/19  0038   SODIUM mmol/L 136 141  --  140   POTASSIUM mmol/L 3.5 3.7 4.8 3.5   CHLORIDE mmol/L 101 101  --  101   CO2 mmol/L 28.4 30.7  --  28.2   BUN mg/dL 27* 25*  --  19   CREATININE mg/dL 0.81 0.67  --  0.65   EGFR IF NONAFRICN AM mL/min/1.73 68 84  --  87   CALCIUM mg/dL 8.9 9.0  --  8.6   GLUCOSE mg/dL 141* 112*  --  95   ALBUMIN g/dL 3.50 3.70  --  3.80   BILIRUBIN mg/dL 0.3 0.4  --  0.5   ALK PHOS U/L 93 96  --  100   AST (SGOT) U/L 25 40*  --  50*   ALT (SGPT) U/L 37* 54*  --  54*   Estimated Creatinine Clearance: 31.8 mL/min (by C-G formula based on SCr of 0.81 mg/dL).  No results found for: AMMONIA    No results found for: HGBA1C, POCGLU  No results found for: HGBA1C  Lab Results   Component Value Date    TSH 4.532 02/09/2019     Pain Management Panel     Pain Management Panel Latest Ref Rng & Units 2/8/2019 12/22/2018    AMPHETAMINES SCREEN, URINE Negative Negative Negative    BARBITURATES SCREEN Negative Negative Negative    BENZODIAZEPINE SCREEN, URINE Negative Negative Negative    BUPRENORPHINE Negative Negative  Negative    COCAINE SCREEN, URINE Negative Negative Negative    METHADONE SCREEN, URINE Negative Negative Negative          I have personally reviewed the above laboratory results.   ----------------------------------------------------------------------------------------------------------------------  Imaging Results (last 24 hours)     ** No results found for the last 24 hours. **        I have personally reviewed the above radiology results.   ----------------------------------------------------------------------------------------------------------------------    Assessment/Plan       Assessment/Plan     ASSESSMENT:      1. Failure to thrive  2. Social issues  3. CHF exacerbation     PLAN:    The patient appeared comfortable this morning with LEYLA Camp at bedside. The patient is reported to have an uneventful night but has had a significant decrease in urine output. Nursing staff reports bladder scan performed and straight cathed with only 35cc in around 12 hours. Nephrology was consulted. The patient also continues to have poor oral intake. Unable to give fluids due to risk outweighing benefit in the setting of CHF.     BNP is elevated at 973 from 815. Does not appear to be in respiratory distress. Repeat chest xray shows improvement of CHF/edema and bilateral airspace disease. Echo shows an EF of 61-65%. Normal white count with CRP slightly elevated at 2.57. Awaiting nursing home placement.       SCDs for DVT prophylaxis.      Discharge planning in place per case management awaiting nursing home placement.    CBC, CRP, CMP ordered for am.       Code Status:   Code Status and Medical Interventions:   Ordered at: 02/08/19 2058     Code Status:    CPR     Medical Interventions (Level of Support Prior to Arrest):    Full       Fernanda Al PA-C  02/12/19  12:45 PM    Electronically signed by Fernanda Al PA-C at 2/12/2019 12:56 PM       Consult Notes (last 24 hours) (Notes from 2/11/2019  2:09 PM  through 2019  2:09 PM)     No notes of this type exist for this encounter.        Nutrition Notes (last 24 hours) (Notes from 2019  2:09 PM through 2019  2:09 PM)     No notes of this type exist for this encounter.        Physical Therapy Notes (last 24 hours) (Notes from 2019  2:09 PM through 2019  2:09 PM)     No notes of this type exist for this encounter.           Occupational Therapy Notes (last 24 hours) (Notes from 2019  2:09 PM through 2019  2:09 PM)      Maria Del Rosario Aranda, OT at 2019 12:14 PM          Acute Care - Occupational Therapy Initial Evaluation   [facility]     Patient Name: Mary Lou Graves  : 1935  MRN: [id number]  Today's Date: 2019  Onset of Illness/Injury or Date of Surgery: 19  Date of Referral to OT: 19  Referring Physician: Servando    Admit Date: 2019       ICD-10-CM ICD-9-CM   1. Failure to thrive in adult R62.7 783.7   2. Dehydration E86.0 276.51   3. Pleural effusion, bilateral J90 511.9   4. Dementia with behavioral disturbance, unspecified dementia type F03.91 294.21     Patient Active Problem List   Diagnosis   • Acute UTI   • Failure to thrive in adult     Past Medical History:   Diagnosis Date   • Dementia    • Depression    • Hyperlipidemia    • Hypertension      Past Surgical History:   Procedure Laterality Date   • HYSTERECTOMY            OT ASSESSMENT FLOWSHEET (last 72 hours)      Occupational Therapy Evaluation     Row Name 19 1201                   OT Evaluation Time/Intention    Subjective Information  no complaints  -BF        Document Type  evaluation  -BF        Mode of Treatment  occupational therapy  -BF        Patient Effort  poor  -BF        Comment  Pt has dementia and has difficulty with following directions and functionally participating with OT. OT will follow pt for therapy pending on pt's ability to participate.  -BF           General Information    Patient Profile Reviewed?  yes   -BF        Onset of Illness/Injury or Date of Surgery  02/08/19  -BF        Referring Physician  Servando  -BF        Patient Observations  alert;cooperative;agree to therapy  -BF        Patient/Family Observations  Pt supine in bed, awake & alert this date  -BF        Prior Level of Function  -- Pt poor historian  -BF        Equipment Currently Used at Home  -- Pt poor historian, unable to provide  -BF        Pertinent History of Current Functional Problem  Pt has dementia  -BF        Existing Precautions/Restrictions  fall;other (see comments) cognition  -BF        Limitations/Impairments  safety/cognitive  -BF        Barriers to Rehab  cognitive status;previous functional deficit  -BF           Cognitive Assessment/Intervention- PT/OT    Orientation Status (Cognition)  unable/difficult to assess  -BF        Follows Commands (Cognition)  0-24% accuracy;verbal cues/prompting required;repetition of directions required;physical/tactile prompts required  -BF        Safety Deficit (Cognitive)  ability to follow commands;awareness of need for assistance;insight into deficits/self awareness;safety precautions awareness;safety precautions follow-through/compliance  -BF           ADL Assessment/Intervention    BADL Assessment/Intervention  bathing;upper body dressing;lower body dressing;grooming;feeding;toileting  -BF           Bathing Assessment/Intervention    Comment (Bathing)  Total A  -BF           Upper Body Dressing Assessment/Training    Comment (Upper Body Dressing)  Total A  -BF           Lower Body Dressing Assessment/Training    Comment (Lower Body Dressing)  Total A  -BF           Grooming Assessment/Training    Comment (Grooming)  Max A  -BF           Self-Feeding Assessment/Training    Comment (Feeding)  Min A  -BF           Toileting Assessment/Training    Comment (Toileting)  Total A  -BF           BADL Safety/Performance    Impairments, BADL Safety/Performance  cognition  -BF           General ROM     GENERAL ROM COMMENTS  Unable to follow directions for ROM; BUE observed to be WFL  -BF           MMT (Manual Muscle Testing)    General MMT Comments  Unable to follow directions  -BF           Motor Assessment/Interventions    Additional Documentation  Fine Motor Testing & Training (Group)  -BF           Fine Motor Testing & Training    Comment, Fine Motor Coordination  Impaired FMC  -BF           Positioning and Restraints    Post Treatment Position  bed  -BF        In Bed  supine;call light within reach;encouraged to call for assist;exit alarm on;with nsg w/ sitter  -BF           Plan of Care Review    Plan of Care Reviewed With  patient  -BF           Clinical Impression (OT)    Date of Referral to OT  02/11/19  -BF        OT Diagnosis  Debility  -BF        Patient/Family Goals Statement (OT Eval)  Unable to verbalize  -BF        Criteria for Skilled Therapeutic Interventions Met (OT Eval)  yes  -BF        Rehab Potential (OT Eval)  fair, will monitor progress closely  -BF        Predicted Duration of Therapy Intervention (Therapy Eval)  1 week  -BF        Anticipated Equipment Needs at Discharge (OT)  -- TBD  -BF        Anticipated Discharge Disposition (OT)  skilled nursing facility;extended care facility  -BF           Planned OT Interventions    Planned Therapy Interventions (OT Eval)  activity tolerance training;BADL retraining;ROM/therapeutic exercise;strengthening exercise  -BF           OT Goals    Grooming Goal Selection (OT)  grooming, OT goal 1  -BF        Additional Documentation  Grooming Goal Selection (OT) (Row)  -BF           Grooming Goal 1 (OT)    Activity/Device (Grooming Goal 1, OT)  grooming skills, all  -BF        [place] (Grooming Goal 1, OT)  minimum assist (75% or more patient effort)  -BF        Time Frame (Grooming Goal 1, OT)  long term goal (LTG);by discharge  -BF           OT Cognitive Goals    Orientation Goal Selection (OT)  orientation, OT goal 1  -BF           Orientation  Goal 1 (OT)    Activity (Orientation Goal 1, OT)  oriented to person  -BF        [place]/Cues/Accuracy (Orientation Goal 1, OT)  independently  -BF        Time Frame (Orientation Goal 1, OT)  long term goal (LTG);by discharge  -BF          User Key  (r) = Recorded By, (t) = Taken By, (c) = Cosigned By    Initials Name Effective Dates    BF Maria Del Rosario Aranda, OT 04/03/18 -                OT Recommendation and Plan  Outcome Summary/Treatment Plan (OT)  Anticipated Equipment Needs at Discharge (OT): (TBD)  Anticipated Discharge Disposition (OT): skilled nursing facility, extended care facility  Planned Therapy Interventions (OT Eval): activity tolerance training, BADL retraining, ROM/therapeutic exercise, strengthening exercise  Plan of Care Review  Plan of Care Reviewed With: patient  Plan of Care Reviewed With: patient    Outcome Measures     Row Name 02/12/19 1213 02/11/19 1100          How much help from another person do you currently need...    Turning from your back to your side while in flat bed without using bedrails?  --  2  -CT     Moving from lying on back to sitting on the side of a flat bed without bedrails?  --  2  -CT     Moving to and from a bed to a chair (including a wheelchair)?  --  2  -CT     Standing up from a chair using your arms (e.g., wheelchair, bedside chair)?  --  2  -CT     Climbing 3-5 steps with a railing?  --  1  -CT     To walk in hospital room?  --  2  -CT     AM-PAC 6 Clicks Score  --  11  -CT        How much help from another is currently needed...    Putting on and taking off regular lower body clothing?  1  -BF  --     Bathing (including washing, rinsing, and drying)  1  -BF  --     Toileting (which includes using toilet bed pan or urinal)  1  -BF  --     Putting on and taking off regular upper body clothing  1  -BF  --     Taking care of personal grooming (such as brushing teeth)  2  -BF  --     Eating meals  3  -BF  --     Score  9  -BF  --        Functional  Assessment    Outcome Measure Options  AM-PAC 6 Clicks Daily Activity (OT)  -BF  AM-PAC 6 Clicks Basic Mobility (PT)  -CT       User Key  (r) = Recorded By, (t) = Taken By, (c) = Cosigned By    Initials Name Provider Type    Maria Del Rosario Funk OT Occupational Therapist    CT Anu Cabrera PT Physical Therapist          Time Calculation:   Time Calculation- OT     Row Name 02/12/19 1213             Time Calculation- OT    Total Timed Code Minutes- OT  40 minute(s)  -BF      OT Non-Billable Time (min)  20 min  -BF        User Key  (r) = Recorded By, (t) = Taken By, (c) = Cosigned By    Initials Name Provider Type    Maria Del Rosario Funk OT Occupational Therapist          Therapy Charges for Today     Code Description Service Date Service Provider Modifiers Qty    [id number] HC OT EVAL HIGH COMPLEXITY 3 2/12/2019 Maria Del Rosario Aranda OT GO 1               Maria Del Rosario Aranda OT  2/12/2019    Electronically signed by Maria Del Rosario Aranda OT at 2/12/2019 12:14 PM     Maria Del Rosario Aranda OT at 2/11/2019  5:02 PM             02/11/19 1701   Rehab Time/Intention   Evaluation Not Performed other (see comments)  (Pt unable to functionally participate with OT on this date, attempt in AM & PM. OT to attempt evaluation again trmw. )   Rehab Treatment   Discipline occupational therapist   Recommendation   OT - Next Appointment 02/12/19       Electronically signed by Maria Del Rosario Aranda OT at 2/11/2019  5:02 PM       Speech Language Pathology Notes (last 24 hours) (Notes from 2/11/2019  2:09 PM through 2/12/2019  2:09 PM)     No notes of this type exist for this encounter.

## 2019-02-12 NOTE — PLAN OF CARE
Problem: Fall Risk (Adult)  Goal: Identify Related Risk Factors and Signs and Symptoms  Outcome: Ongoing (interventions implemented as appropriate)    Goal: Absence of Fall  Outcome: Ongoing (interventions implemented as appropriate)      Problem: Skin Injury Risk (Adult)  Goal: Skin Health and Integrity  Outcome: Ongoing (interventions implemented as appropriate)      Problem: Cognitive Impairment (IRF) (Adult)  Goal: Optimal Quality of Life in Relation to Cognitive Impairments  Outcome: Ongoing (interventions implemented as appropriate)      Problem: Cardiac Output Decreased (Adult)  Goal: Identify Related Risk Factors and Signs and Symptoms  Outcome: Ongoing (interventions implemented as appropriate)    Goal: Effective Tissue Perfusion  Outcome: Ongoing (interventions implemented as appropriate)      Problem: Urinary Tract Infection (Adult)  Goal: Signs and Symptoms of Listed Potential Problems Will be Absent, Minimized or Managed (Urinary Tract Infection)  Outcome: Ongoing (interventions implemented as appropriate)      Problem: Patient Care Overview  Goal: Individualization and Mutuality  Outcome: Ongoing (interventions implemented as appropriate)    Goal: Discharge Needs Assessment  Outcome: Ongoing (interventions implemented as appropriate)    Goal: Interprofessional Rounds/Family Conf  Outcome: Ongoing (interventions implemented as appropriate)      Problem: Mobility, Physical Impaired (Adult)  Goal: Identify Related Risk Factors and Signs and Symptoms  Outcome: Ongoing (interventions implemented as appropriate)    Goal: Enhanced Mobility Skills  Outcome: Ongoing (interventions implemented as appropriate)    Goal: Enhanced Functional Ability  Outcome: Ongoing (interventions implemented as appropriate)

## 2019-02-12 NOTE — CONSULTS
Nephrology Consult Note    Referring Provider: Fernanda Al  Reason for Consultation: oliguria    Subjective       History of present illness:  Mary Lou Graves is a 83 y.o. female who presented to [facility] emergency department with chief complaint of AMS. She is pleasantly demented. She is not able to provide much history. She is sitting comfortably. She is not drinking or eating much. She had about 100 cc on bladder scan this morning and did not void last night. She had lightly saturated brief today. She is incontenent. No nausea, vomiting or diarrhea reported. Her creatinine is 0.8  Patient denies hematuria, dysuria, difficulty passing urine.     History  Past Medical History:   Diagnosis Date   • Dementia    • Depression    • Hyperlipidemia    • Hypertension    , Past Surgical History:   Procedure Laterality Date   • HYSTERECTOMY     , History reviewed. No pertinent family history., Social History     Tobacco Use   • Smoking status: Current Every Day Smoker     Packs/day: 0.50   • Smokeless tobacco: Never Used   Substance Use Topics   • Alcohol use: No     Frequency: Never     Comment: denies   • Drug use: No     Comment: denies   , Medications Prior to Admission   Medication Sig Dispense Refill Last Dose   • amLODIPine (NORVASC) 5 MG tablet Take 5 mg by mouth Daily.   2/7/2019 at Unknown time   • aspirin 81 MG chewable tablet Chew 81 mg Daily.   2/7/2019 at Unknown time   • carBAMazepine (TEGretol) 200 MG tablet Take 200 mg by mouth 2 (Two) Times a Day.   2/7/2019 at Unknown time   • dabigatran etexilate (PRADAXA) 150 MG capsu Take 150 mg by mouth 2 (Two) Times a Day.   2/7/2019 at Unknown time   • ibuprofen (ADVIL,MOTRIN) 400 MG tablet Take 400 mg by mouth Every 6 (Six) Hours As Needed for Mild Pain .   2/7/2019 at Unknown time   • mirtazapine (REMERON SOL-TAB) 15 MG disintegrating tablet Take 15 mg by mouth Every Night.   2/7/2019 at Unknown time   • nicotine (NICODERM CQ) 14 MG/24HR patch  Place 1 patch on the skin as directed by provider Daily.   2/7/2019 at Unknown time   • polyethyl glycol-propyl glycol (SYSTANE) 0.4-0.3 % solution ophthalmic solution Administer 1 drop to both eyes 3 (Three) Times a Day.   2/7/2019 at Unknown time   • QUEtiapine (SEROquel) 100 MG tablet Take 150 mg by mouth Every Night.   2/7/2019 at Unknown time   • QUEtiapine (SEROquel) 50 MG tablet Take 50 mg by mouth Every Morning.   2/7/2019 at Unknown time   • sertraline (ZOLOFT) 100 MG tablet Take 100 mg by mouth Daily.   2/7/2019 at Unknown time   , Scheduled Meds:    amLODIPine 5 mg Oral Daily   aspirin 81 mg Oral Daily   carBAMazepine 200 mg Oral BID   dabigatran etexilate 150 mg Oral BID   mirtazapine 15 mg Oral Nightly   nicotine 1 patch Transdermal Q24H   QUEtiapine 150 mg Oral Nightly   QUEtiapine 50 mg Oral Daily   sertraline 100 mg Oral Daily   sodium chloride 10 mL Intravenous Q12H   sodium chloride 3 mL Intravenous Q12H   , Continuous Infusions:    sodium chloride 50 mL/hr   , PRN Meds:  •  acetaminophen  •  carboxymethylcellulose  •  magnesium sulfate **OR** magnesium sulfate **OR** magnesium sulfate  •  nitroglycerin  •  ondansetron **OR** ondansetron ODT **OR** ondansetron  •  potassium chloride **OR** potassium chloride **OR** potassium chloride  •  [COMPLETED] Insert peripheral IV **AND** sodium chloride  •  sodium chloride  •  sodium chloride and Allergies:  Patient has no known allergies.    Review of Systems  Not able to provide    Objective     Vital Signs  Temp:  [96.6 °F (35.9 °C)-98 °F (36.7 °C)] 98 °F (36.7 °C)  Heart Rate:  [70] 70  Resp:  [18] 18  BP: (128-150)/(68-90) 128/69    No intake/output data recorded.  I/O last 3 completed shifts:  In: 240 [P.O.:240]  Out: 100 [Urine:100]    Physical Examination:    General Appearance : alert, appears stated age, cooperative and no distress  Head : normocephalic, without obvious abnormality and atraumatic  Eyes : conjunctivae and sclerae normal, no icterus,  no pallor and PERRLA  Throat : oral mucosa dry  Neck: no adenopathy, suppple, no carotid bruit and no JVD  Lungs : clear to auscultation, respirations regular and unlabored  Heart : regular rhythm & normal rate, normal S1, S2, no murmur, no koffi, no rub   Abdomen :  normal bowel sounds, no masses and soft non-tender  Rectal : Deferred  Extremities : moves extremities well, no redness and no edema  Pulses :  palpable and equal bilaterally  Skin : no bleeding, bruising or rash  Neurologic : confused, grossly no focal deficitis    Laboratory Data :      WBC WBC   Date Value Ref Range Status   02/12/2019 12.48 4.50 - 12.50 10*3/mm3 Final   02/11/2019 10.38 4.50 - 12.50 10*3/mm3 Final      HGB Hemoglobin   Date Value Ref Range Status   02/12/2019 11.3 (L) 12.0 - 16.0 g/dL Final   02/11/2019 12.0 12.0 - 16.0 g/dL Final      HCT Hematocrit   Date Value Ref Range Status   02/12/2019 35.4 (L) 37.0 - 47.0 % Final   02/11/2019 36.9 (L) 37.0 - 47.0 % Final      Platlets No results found for: LABPLAT   MCV MCV   Date Value Ref Range Status   02/12/2019 89.2 80.0 - 94.0 fL Final   02/11/2019 89.1 80.0 - 94.0 fL Final          Sodium Sodium   Date Value Ref Range Status   02/12/2019 136 135 - 153 mmol/L Final   02/11/2019 141 135 - 153 mmol/L Final      Potassium Potassium   Date Value Ref Range Status   02/12/2019 3.5 3.5 - 5.3 mmol/L Final   02/11/2019 3.7 3.5 - 5.3 mmol/L Final   02/10/2019 4.8 3.5 - 5.3 mmol/L Final     Comment:     2+ Hemolysis       Chloride Chloride   Date Value Ref Range Status   02/12/2019 101 99 - 112 mmol/L Final   02/11/2019 101 99 - 112 mmol/L Final      CO2 CO2   Date Value Ref Range Status   02/12/2019 28.4 24.3 - 31.9 mmol/L Final   02/11/2019 30.7 24.3 - 31.9 mmol/L Final      BUN BUN   Date Value Ref Range Status   02/12/2019 27 (H) 7 - 21 mg/dL Final   02/11/2019 25 (H) 7 - 21 mg/dL Final      Creatinine Creatinine   Date Value Ref Range Status   02/12/2019 0.81 0.43 - 1.29 mg/dL Final    02/11/2019 0.67 0.43 - 1.29 mg/dL Final      Calcium Calcium   Date Value Ref Range Status   02/12/2019 8.9 7.7 - 10.0 mg/dL Final   02/11/2019 9.0 7.7 - 10.0 mg/dL Final      PO4 No results found for: CAPO4   Albumin Albumin   Date Value Ref Range Status   02/12/2019 3.50 3.40 - 4.80 g/dL Final   02/11/2019 3.70 3.40 - 4.80 g/dL Final      Magnesium No results found for: MG   Uric Acid No results found for: URICACID     Radiology results :     Imaging Results (last 72 hours)     Procedure Component Value Units Date/Time    XR Chest 1 View [[id number]] Collected:  02/11/19 0823     Updated:  02/11/19 0825    Narrative:       EXAMINATION: XR CHEST 1 VW-      CLINICAL INDICATION:     shortness of breath; R62.7-Adult failure to  thrive; E86.0-Dehydration; N20-Vzxppya effusion, not elsewhere  classified; F03.91-Unspecified dementia with behavioral disturbance     TECHNIQUE:  XR CHEST 1 VW-      COMPARISON: 2/8/2019      FINDINGS:   Prior CABG. Left pacer device noted. Interstitial edema has improved.  Vascular congestion improved. Bilateral opacities improved with improved  aeration noted. No effusion or pneumothorax. Chest otherwise stable.       Impression:       Improvement in CHF/edema and bilateral airspace disease.     This report was finalized on 2/11/2019 8:23 AM by Dr. Derik Arzola MD.               Medications:        amLODIPine 5 mg Oral Daily   aspirin 81 mg Oral Daily   carBAMazepine 200 mg Oral BID   dabigatran etexilate 150 mg Oral BID   mirtazapine 15 mg Oral Nightly   nicotine 1 patch Transdermal Q24H   QUEtiapine 150 mg Oral Nightly   QUEtiapine 50 mg Oral Daily   sertraline 100 mg Oral Daily   sodium chloride 10 mL Intravenous Q12H   sodium chloride 3 mL Intravenous Q12H       sodium chloride 50 mL/hr       Assessment/Plan       Failure to thrive in adult      1. Oliguria : creatinine is stable. She is likely volume depleted due to poor po intake. Will start NS at 50 cc/hr. Will check urine  Na    2. Diastolic CHF : compensated    3. dementia    4. HTN stable    Thanks  for the consult. We will follow with you.  I discussed the patient's findings and my recommendations with nursing staff    Gurwinder Nettles MD  02/12/19  2:25 PM

## 2019-02-12 NOTE — PROGRESS NOTES
PROGRESS NOTE         Patient Identification:  Name:  Mary Lou Graves  Age:  83 y.o.  Sex:  female  :  1935  MRN:  [id number]  Visit Number:  [id number]  Primary Care Provider:  London Merritt MD      ----------------------------------------------------------------------------------------------------------------------  Subjective       Chief Complaints:    Dementia (PATIENT WAS SEEN BY FAMILY PHYSICIAN THIS AM AND WOULD NOT GET OUT OF THE CAR. THE PLAN WAS TO ECVALUATE HER FOR NURSING HOME PLACEMENT DUE TO WORSENING DEMENTIA.)         Interval History:      The patient appeared comfortable this morning with LEYLA Camp at bedside. The patient is reported to have an uneventful night but has had a significant decrease in urine output. Nursing staff reports bladder scan performed and straight cathed with only 35cc in around 12 hours. Nephrology was consulted. The patient also continues to have poor oral intake. Unable to give fluids due to risk outweighing benefit in the setting of CHF.     Review of Systems:    Unable to obtain due to the patients confusion  ----------------------------------------------------------------------------------------------------------------------      Objective       Current [facility] Meds:    amLODIPine 5 mg Oral Daily   aspirin 81 mg Oral Daily   carBAMazepine 200 mg Oral BID   dabigatran etexilate 150 mg Oral BID   mirtazapine 15 mg Oral Nightly   nicotine 1 patch Transdermal Q24H   QUEtiapine 150 mg Oral Nightly   QUEtiapine 50 mg Oral Daily   sertraline 100 mg Oral Daily   sodium chloride 10 mL Intravenous Q12H   sodium chloride 3 mL Intravenous Q12H        ----------------------------------------------------------------------------------------------------------------------    Vital Signs:  Temp:  [96.6 °F (35.9 °C)-98 °F (36.7 °C)] 98 °F (36.7 °C)  Heart Rate:  [70] 70  Resp:  [18] 18  BP: (128-150)/(68-90) 128/69  Mean Arterial Pressure (Non-Invasive) for the past  24 hrs (Last 3 readings):   Noninvasive MAP (mmHg)   02/11/19 1448 112     SpO2 Percentage    02/11/19 1448 02/11/19 1815 02/12/19 0344   SpO2: 95% 91% 97%     SpO2:  [91 %-97 %] 97 %  on   ;   Device (Oxygen Therapy): room air    Body mass index is 13.56 kg/m².  Wt Readings from Last 3 Encounters:   02/12/19 37 kg (81 lb 8 oz)   12/21/18 44 kg (97 lb)   12/17/18 44 kg (97 lb)        Intake/Output Summary (Last 24 hours) at 2/12/2019 1245  Last data filed at 2/12/2019 0344  Gross per 24 hour   Intake 240 ml   Output 100 ml   Net 140 ml     Diet Regular  ----------------------------------------------------------------------------------------------------------------------    Physical Exam:     Constitutional:  Well-developed and well-nourished.  No respiratory distress. Calm. Confused.      HENT:  Head: Normocephalic and atraumatic.  Mouth:  Moist mucous membranes.    Eyes:  Conjunctivae and EOM are normal.  No scleral icterus.  Neck:  Neck supple.  No JVD present.    Cardiovascular:  Normal rate, regular rhythm and normal heart sounds with no murmur. No edema.  Pulmonary/Chest:  No respiratory distress, no wheezes, no crackles, with normal breath sounds and good air movement.  Abdominal:  Soft.  Bowel sounds are normal.  No distension and no tenderness.   Musculoskeletal:  No edema, no tenderness, and no deformity.  No swelling or redness of joints.  Neurological:  Disoriented.  No facial droop.  No slurred speech.   Skin:  Skin is warm and dry.  No rash noted.  No pallor.   Psychiatric:  Normal mood and affect.  Behavior is normal.    ----------------------------------------------------------------------------------------------------------------------  I have personally reviewed the EKG/Telemetry strips   ----------------------------------------------------------------------------------------------------------------------        Results from last 7 days   Lab Units 02/09/19  0038   CHOLESTEROL mg/dL 189    TRIGLYCERIDES mg/dL 159*   HDL CHOL mg/dL 26*   LDL CHOL mg/dL 131*       Results from last 7 days   Lab Units 02/12/19  0333 02/12/19  0332 02/11/19 0437 02/09/19 0038   CRP mg/dL  --  2.08* 2.57*  --    WBC 10*3/mm3 12.48  --  10.38 8.38   HEMOGLOBIN g/dL 11.3*  --  12.0 11.5*   HEMATOCRIT % 35.4*  --  36.9* 35.5*   MCV fL 89.2  --  89.1 89.4   MCHC g/dL 31.9*  --  32.5* 32.4*   PLATELETS 10*3/mm3 336  --  329 284     Results from last 7 days   Lab Units 02/12/19  0655 02/11/19  0437 02/10/19  0400 02/09/19  0038   SODIUM mmol/L 136 141  --  140   POTASSIUM mmol/L 3.5 3.7 4.8 3.5   CHLORIDE mmol/L 101 101  --  101   CO2 mmol/L 28.4 30.7  --  28.2   BUN mg/dL 27* 25*  --  19   CREATININE mg/dL 0.81 0.67  --  0.65   EGFR IF NONAFRICN AM mL/min/1.73 68 84  --  87   CALCIUM mg/dL 8.9 9.0  --  8.6   GLUCOSE mg/dL 141* 112*  --  95   ALBUMIN g/dL 3.50 3.70  --  3.80   BILIRUBIN mg/dL 0.3 0.4  --  0.5   ALK PHOS U/L 93 96  --  100   AST (SGOT) U/L 25 40*  --  50*   ALT (SGPT) U/L 37* 54*  --  54*   Estimated Creatinine Clearance: 31.8 mL/min (by C-G formula based on SCr of 0.81 mg/dL).  No results found for: AMMONIA    No results found for: HGBA1C, POCGLU  No results found for: HGBA1C  Lab Results   Component Value Date    TSH 4.532 02/09/2019     Pain Management Panel     Pain Management Panel Latest Ref Rng & Units 2/8/2019 12/22/2018    AMPHETAMINES SCREEN, URINE Negative Negative Negative    BARBITURATES SCREEN Negative Negative Negative    BENZODIAZEPINE SCREEN, URINE Negative Negative Negative    BUPRENORPHINE Negative Negative Negative    COCAINE SCREEN, URINE Negative Negative Negative    METHADONE SCREEN, URINE Negative Negative Negative          I have personally reviewed the above laboratory results.   ----------------------------------------------------------------------------------------------------------------------  Imaging Results (last 24 hours)     ** No results found for the last 24 hours. **         I have personally reviewed the above radiology results.   ----------------------------------------------------------------------------------------------------------------------    Assessment/Plan       Assessment/Plan     ASSESSMENT:      1. Failure to thrive  2. Social issues  3. CHF exacerbation     PLAN:    The patient appeared comfortable this morning with Conrado RN at bedside. The patient is reported to have an uneventful night but has had a significant decrease in urine output. Nursing staff reports bladder scan performed and straight cathed with only 35cc in around 12 hours. Nephrology was consulted. The patient also continues to have poor oral intake. Unable to give fluids due to risk outweighing benefit in the setting of CHF.     BNP is elevated at 973 from 815. Does not appear to be in respiratory distress. Repeat chest xray shows improvement of CHF/edema and bilateral airspace disease. Echo shows an EF of 61-65%. Normal white count with CRP slightly elevated at 2.57. Awaiting nursing home placement.       SCDs for DVT prophylaxis.      Discharge planning in place per case management awaiting nursing home placement.    CBC, CRP, CMP ordered for am.       Code Status:   Code Status and Medical Interventions:   Ordered at: 02/08/19 2058     Code Status:    CPR     Medical Interventions (Level of Support Prior to Arrest):    Full       Fernanda Al PA-C  02/12/19  12:45 PM

## 2019-02-12 NOTE — THERAPY EVALUATION
Acute Care - Occupational Therapy Initial Evaluation   [facility]     Patient Name: Mary Lou Graves  : 1935  MRN: [id number]  Today's Date: 2019  Onset of Illness/Injury or Date of Surgery: 19  Date of Referral to OT: 19  Referring Physician: Servando    Admit Date: 2019       ICD-10-CM ICD-9-CM   1. Failure to thrive in adult R62.7 783.7   2. Dehydration E86.0 276.51   3. Pleural effusion, bilateral J90 511.9   4. Dementia with behavioral disturbance, unspecified dementia type F03.91 294.21     Patient Active Problem List   Diagnosis   • Acute UTI   • Failure to thrive in adult     Past Medical History:   Diagnosis Date   • Dementia    • Depression    • Hyperlipidemia    • Hypertension      Past Surgical History:   Procedure Laterality Date   • HYSTERECTOMY            OT ASSESSMENT FLOWSHEET (last 72 hours)      Occupational Therapy Evaluation     Row Name 19 1201                   OT Evaluation Time/Intention    Subjective Information  no complaints  -BF        Document Type  evaluation  -BF        Mode of Treatment  occupational therapy  -BF        Patient Effort  poor  -BF        Comment  Pt has dementia and has difficulty with following directions and functionally participating with OT. OT will follow pt for therapy pending on pt's ability to participate.  -BF           General Information    Patient Profile Reviewed?  yes  -BF        Onset of Illness/Injury or Date of Surgery  19  -BF        Referring Physician  Servando  -BF        Patient Observations  alert;cooperative;agree to therapy  -BF        Patient/Family Observations  Pt supine in bed, awake & alert this date  -BF        Prior Level of Function  -- Pt poor historian  -BF        Equipment Currently Used at Home  -- Pt poor historian, unable to provide  -BF        Pertinent History of Current Functional Problem  Pt has dementia  -BF        Existing Precautions/Restrictions  fall;other (see comments) cognition   -BF        Limitations/Impairments  safety/cognitive  -BF        Barriers to Rehab  cognitive status;previous functional deficit  -BF           Cognitive Assessment/Intervention- PT/OT    Orientation Status (Cognition)  unable/difficult to assess  -BF        Follows Commands (Cognition)  0-24% accuracy;verbal cues/prompting required;repetition of directions required;physical/tactile prompts required  -BF        Safety Deficit (Cognitive)  ability to follow commands;awareness of need for assistance;insight into deficits/self awareness;safety precautions awareness;safety precautions follow-through/compliance  -BF           ADL Assessment/Intervention    BADL Assessment/Intervention  bathing;upper body dressing;lower body dressing;grooming;feeding;toileting  -BF           Bathing Assessment/Intervention    Comment (Bathing)  Total A  -BF           Upper Body Dressing Assessment/Training    Comment (Upper Body Dressing)  Total A  -BF           Lower Body Dressing Assessment/Training    Comment (Lower Body Dressing)  Total A  -BF           Grooming Assessment/Training    Comment (Grooming)  Max A  -BF           Self-Feeding Assessment/Training    Comment (Feeding)  Min A  -BF           Toileting Assessment/Training    Comment (Toileting)  Total A  -BF           BADL Safety/Performance    Impairments, BADL Safety/Performance  cognition  -BF           General ROM    GENERAL ROM COMMENTS  Unable to follow directions for ROM; BUE observed to be WFL  -BF           MMT (Manual Muscle Testing)    General MMT Comments  Unable to follow directions  -BF           Motor Assessment/Interventions    Additional Documentation  Fine Motor Testing & Training (Group)  -BF           Fine Motor Testing & Training    Comment, Fine Motor Coordination  Impaired FMC  -BF           Positioning and Restraints    Post Treatment Position  bed  -BF        In Bed  supine;call light within reach;encouraged to call for assist;exit alarm on;with nsg w/  sitter  -BF           Plan of Care Review    Plan of Care Reviewed With  patient  -BF           Clinical Impression (OT)    Date of Referral to OT  02/11/19  -BF        OT Diagnosis  Debility  -BF        Patient/Family Goals Statement (OT Eval)  Unable to verbalize  -BF        Criteria for Skilled Therapeutic Interventions Met (OT Eval)  yes  -BF        Rehab Potential (OT Eval)  fair, will monitor progress closely  -BF        Predicted Duration of Therapy Intervention (Therapy Eval)  1 week  -BF        Anticipated Equipment Needs at Discharge (OT)  -- TBD  -BF        Anticipated Discharge Disposition (OT)  skilled nursing facility;extended care facility  -BF           Planned OT Interventions    Planned Therapy Interventions (OT Eval)  activity tolerance training;BADL retraining;ROM/therapeutic exercise;strengthening exercise  -BF           OT Goals    Grooming Goal Selection (OT)  grooming, OT goal 1  -BF        Additional Documentation  Grooming Goal Selection (OT) (Row)  -BF           Grooming Goal 1 (OT)    Activity/Device (Grooming Goal 1, OT)  grooming skills, all  -BF        [place] (Grooming Goal 1, OT)  minimum assist (75% or more patient effort)  -BF        Time Frame (Grooming Goal 1, OT)  long term goal (LTG);by discharge  -BF           OT Cognitive Goals    Orientation Goal Selection (OT)  orientation, OT goal 1  -BF           Orientation Goal 1 (OT)    Activity (Orientation Goal 1, OT)  oriented to person  -BF        [place]/Cues/Accuracy (Orientation Goal 1, OT)  independently  -BF        Time Frame (Orientation Goal 1, OT)  long term goal (LTG);by discharge  -BF          User Key  (r) = Recorded By, (t) = Taken By, (c) = Cosigned By    Initials Name Effective Dates    BF Maria Del Rosario Aranda, OT 04/03/18 -                OT Recommendation and Plan  Outcome Summary/Treatment Plan (OT)  Anticipated Equipment Needs at Discharge (OT): (TBD)  Anticipated Discharge Disposition (OT): skilled  nursing facility, extended care facility  Planned Therapy Interventions (OT Eval): activity tolerance training, BADL retraining, ROM/therapeutic exercise, strengthening exercise  Plan of Care Review  Plan of Care Reviewed With: patient  Plan of Care Reviewed With: patient    Outcome Measures     Row Name 02/12/19 1213 02/11/19 1100          How much help from another person do you currently need...    Turning from your back to your side while in flat bed without using bedrails?  --  2  -CT     Moving from lying on back to sitting on the side of a flat bed without bedrails?  --  2  -CT     Moving to and from a bed to a chair (including a wheelchair)?  --  2  -CT     Standing up from a chair using your arms (e.g., wheelchair, bedside chair)?  --  2  -CT     Climbing 3-5 steps with a railing?  --  1  -CT     To walk in hospital room?  --  2  -CT     AM-PAC 6 Clicks Score  --  11  -CT        How much help from another is currently needed...    Putting on and taking off regular lower body clothing?  1  -BF  --     Bathing (including washing, rinsing, and drying)  1  -BF  --     Toileting (which includes using toilet bed pan or urinal)  1  -BF  --     Putting on and taking off regular upper body clothing  1  -BF  --     Taking care of personal grooming (such as brushing teeth)  2  -BF  --     Eating meals  3  -BF  --     Score  9  -BF  --        Functional Assessment    Outcome Measure Options  AM-PAC 6 Clicks Daily Activity (OT)  -BF  AM-PAC 6 Clicks Basic Mobility (PT)  -CT       User Key  (r) = Recorded By, (t) = Taken By, (c) = Cosigned By    Initials Name Provider Type    BF Maria Del Rosario Aranda, OT Occupational Therapist    CT Anu Cabrera, PT Physical Therapist          Time Calculation:   Time Calculation- OT     Row Name 02/12/19 1213             Time Calculation- OT    Total Timed Code Minutes- OT  40 minute(s)  -BF      OT Non-Billable Time (min)  20 min  -BF        User Key  (r) = Recorded By, (t) = Taken  By, (c) = Cosigned By    Initials Name Provider Type     Maria Del Rosario Aranda OT Occupational Therapist          Therapy Charges for Today     Code Description Service Date Service Provider Modifiers Qty    [id number]  OT EVAL HIGH COMPLEXITY 3 2/12/2019 Maria Del Rosario Aranda OT GO 1               Maria Del Rosario Aranda OT  2/12/2019

## 2019-02-13 ENCOUNTER — APPOINTMENT (OUTPATIENT)
Dept: GENERAL RADIOLOGY | Facility: HOSPITAL | Age: 84
End: 2019-02-13

## 2019-02-13 LAB
ALBUMIN SERPL-MCNC: 3.3 G/DL (ref 3.4–4.8)
ALBUMIN SERPL-MCNC: 3.6 G/DL (ref 3.4–4.8)
ALBUMIN/GLOB SERPL: 1.2 G/DL (ref 1.5–2.5)
ALBUMIN/GLOB SERPL: 1.3 G/DL (ref 1.5–2.5)
ALP SERPL-CCNC: 83 U/L (ref 35–104)
ALP SERPL-CCNC: 85 U/L (ref 35–104)
ALT SERPL W P-5'-P-CCNC: 28 U/L (ref 10–36)
ALT SERPL W P-5'-P-CCNC: 38 U/L (ref 10–36)
ANION GAP SERPL CALCULATED.3IONS-SCNC: 8 MMOL/L (ref 3.6–11.2)
ANION GAP SERPL CALCULATED.3IONS-SCNC: 9.4 MMOL/L (ref 3.6–11.2)
AST SERPL-CCNC: 23 U/L (ref 10–30)
AST SERPL-CCNC: 44 U/L (ref 10–30)
BASOPHILS # BLD AUTO: 0.02 10*3/MM3 (ref 0–0.3)
BASOPHILS # BLD AUTO: 0.02 10*3/MM3 (ref 0–0.3)
BASOPHILS NFR BLD AUTO: 0.2 % (ref 0–2)
BASOPHILS NFR BLD AUTO: 0.2 % (ref 0–2)
BILIRUB SERPL-MCNC: 0.4 MG/DL (ref 0.2–1.8)
BILIRUB SERPL-MCNC: 0.4 MG/DL (ref 0.2–1.8)
BNP SERPL-MCNC: 952 PG/ML (ref 0–100)
BUN BLD-MCNC: 18 MG/DL (ref 7–21)
BUN BLD-MCNC: 22 MG/DL (ref 7–21)
BUN/CREAT SERPL: 28.1 (ref 7–25)
BUN/CREAT SERPL: 31.4 (ref 7–25)
CALCIUM SPEC-SCNC: 8.4 MG/DL (ref 7.7–10)
CALCIUM SPEC-SCNC: 8.5 MG/DL (ref 7.7–10)
CHLORIDE SERPL-SCNC: 103 MMOL/L (ref 99–112)
CHLORIDE SERPL-SCNC: 103 MMOL/L (ref 99–112)
CO2 SERPL-SCNC: 24 MMOL/L (ref 24.3–31.9)
CO2 SERPL-SCNC: 24.6 MMOL/L (ref 24.3–31.9)
CREAT BLD-MCNC: 0.64 MG/DL (ref 0.43–1.29)
CREAT BLD-MCNC: 0.7 MG/DL (ref 0.43–1.29)
CREAT UR-MCNC: 156.7 MG/DL
CRP SERPL-MCNC: 1.62 MG/DL (ref 0–0.99)
CRP SERPL-MCNC: 1.65 MG/DL (ref 0–0.99)
DEPRECATED RDW RBC AUTO: 43.7 FL (ref 37–54)
DEPRECATED RDW RBC AUTO: 44.9 FL (ref 37–54)
EOSINOPHIL # BLD AUTO: 0.04 10*3/MM3 (ref 0–0.7)
EOSINOPHIL # BLD AUTO: 0.05 10*3/MM3 (ref 0–0.7)
EOSINOPHIL NFR BLD AUTO: 0.3 % (ref 0–7)
EOSINOPHIL NFR BLD AUTO: 0.5 % (ref 0–7)
ERYTHROCYTE [DISTWIDTH] IN BLOOD BY AUTOMATED COUNT: 13.7 % (ref 11.5–14.5)
ERYTHROCYTE [DISTWIDTH] IN BLOOD BY AUTOMATED COUNT: 14.1 % (ref 11.5–14.5)
GFR SERPL CREATININE-BSD FRML MDRD: 80 ML/MIN/1.73
GFR SERPL CREATININE-BSD FRML MDRD: 89 ML/MIN/1.73
GLOBULIN UR ELPH-MCNC: 2.8 GM/DL
GLOBULIN UR ELPH-MCNC: 2.8 GM/DL
GLUCOSE BLD-MCNC: 120 MG/DL (ref 70–110)
GLUCOSE BLD-MCNC: 134 MG/DL (ref 70–110)
HCT VFR BLD AUTO: 34.8 % (ref 37–47)
HCT VFR BLD AUTO: 35.2 % (ref 37–47)
HGB BLD-MCNC: 11.3 G/DL (ref 12–16)
HGB BLD-MCNC: 11.8 G/DL (ref 12–16)
IMM GRANULOCYTES # BLD AUTO: 0.05 10*3/MM3 (ref 0–0.03)
IMM GRANULOCYTES # BLD AUTO: 0.06 10*3/MM3 (ref 0–0.03)
IMM GRANULOCYTES NFR BLD AUTO: 0.5 % (ref 0–0.5)
IMM GRANULOCYTES NFR BLD AUTO: 0.5 % (ref 0–0.5)
LYMPHOCYTES # BLD AUTO: 0.71 10*3/MM3 (ref 1–3)
LYMPHOCYTES # BLD AUTO: 0.75 10*3/MM3 (ref 1–3)
LYMPHOCYTES NFR BLD AUTO: 6 % (ref 16–46)
LYMPHOCYTES NFR BLD AUTO: 7.7 % (ref 16–46)
MCH RBC QN AUTO: 28.9 PG (ref 27–33)
MCH RBC QN AUTO: 29.4 PG (ref 27–33)
MCHC RBC AUTO-ENTMCNC: 32.5 G/DL (ref 33–37)
MCHC RBC AUTO-ENTMCNC: 33.5 G/DL (ref 33–37)
MCV RBC AUTO: 87.6 FL (ref 80–94)
MCV RBC AUTO: 89 FL (ref 80–94)
MONOCYTES # BLD AUTO: 0.78 10*3/MM3 (ref 0.1–0.9)
MONOCYTES # BLD AUTO: 0.93 10*3/MM3 (ref 0.1–0.9)
MONOCYTES NFR BLD AUTO: 7.8 % (ref 0–12)
MONOCYTES NFR BLD AUTO: 8 % (ref 0–12)
NEUTROPHILS # BLD AUTO: 10.14 10*3/MM3 (ref 1.4–6.5)
NEUTROPHILS # BLD AUTO: 8.07 10*3/MM3 (ref 1.4–6.5)
NEUTROPHILS NFR BLD AUTO: 83.1 % (ref 40–75)
NEUTROPHILS NFR BLD AUTO: 85.2 % (ref 40–75)
OSMOLALITY SERPL CALC.SUM OF ELEC: 274 MOSM/KG (ref 273–305)
OSMOLALITY SERPL CALC.SUM OF ELEC: 278.3 MOSM/KG (ref 273–305)
PLATELET # BLD AUTO: 260 10*3/MM3 (ref 130–400)
PLATELET # BLD AUTO: 350 10*3/MM3 (ref 130–400)
PMV BLD AUTO: 9.8 FL (ref 6–10)
PMV BLD AUTO: 9.9 FL (ref 6–10)
POTASSIUM BLD-SCNC: 3.7 MMOL/L (ref 3.5–5.3)
POTASSIUM BLD-SCNC: 5.3 MMOL/L (ref 3.5–5.3)
PROT SERPL-MCNC: 6.1 G/DL (ref 6–8)
PROT SERPL-MCNC: 6.4 G/DL (ref 6–8)
RBC # BLD AUTO: 3.91 10*6/MM3 (ref 4.2–5.4)
RBC # BLD AUTO: 4.02 10*6/MM3 (ref 4.2–5.4)
SODIUM BLD-SCNC: 135 MMOL/L (ref 135–153)
SODIUM BLD-SCNC: 137 MMOL/L (ref 135–153)
SODIUM UR-SCNC: 34 MMOL/L
WBC NRBC COR # BLD: 11.9 10*3/MM3 (ref 4.5–12.5)
WBC NRBC COR # BLD: 9.72 10*3/MM3 (ref 4.5–12.5)

## 2019-02-13 PROCEDURE — 92610 EVALUATE SWALLOWING FUNCTION: CPT

## 2019-02-13 PROCEDURE — 82570 ASSAY OF URINE CREATININE: CPT | Performed by: INTERNAL MEDICINE

## 2019-02-13 PROCEDURE — 74018 RADEX ABDOMEN 1 VIEW: CPT

## 2019-02-13 PROCEDURE — 74018 RADEX ABDOMEN 1 VIEW: CPT | Performed by: RADIOLOGY

## 2019-02-13 PROCEDURE — 84300 ASSAY OF URINE SODIUM: CPT | Performed by: INTERNAL MEDICINE

## 2019-02-13 PROCEDURE — 85025 COMPLETE CBC W/AUTO DIFF WBC: CPT | Performed by: PHYSICIAN ASSISTANT

## 2019-02-13 PROCEDURE — 94799 UNLISTED PULMONARY SVC/PX: CPT

## 2019-02-13 PROCEDURE — 83880 ASSAY OF NATRIURETIC PEPTIDE: CPT | Performed by: PHYSICIAN ASSISTANT

## 2019-02-13 PROCEDURE — 86140 C-REACTIVE PROTEIN: CPT | Performed by: PHYSICIAN ASSISTANT

## 2019-02-13 PROCEDURE — 80053 COMPREHEN METABOLIC PANEL: CPT | Performed by: PHYSICIAN ASSISTANT

## 2019-02-13 RX ORDER — DEXTROSE AND SODIUM CHLORIDE 5; .9 G/100ML; G/100ML
30 INJECTION, SOLUTION INTRAVENOUS CONTINUOUS
Status: DISCONTINUED | OUTPATIENT
Start: 2019-02-13 | End: 2019-02-14 | Stop reason: HOSPADM

## 2019-02-13 RX ADMIN — CARBAMAZEPINE 200 MG: 200 TABLET ORAL at 21:13

## 2019-02-13 RX ADMIN — ASPIRIN 81 MG: 81 TABLET, CHEWABLE ORAL at 08:19

## 2019-02-13 RX ADMIN — SERTRALINE 100 MG: 50 TABLET, FILM COATED ORAL at 08:19

## 2019-02-13 RX ADMIN — DABIGATRAN ETEXILATE MESYLATE 150 MG: 150 CAPSULE ORAL at 08:19

## 2019-02-13 RX ADMIN — CARBAMAZEPINE 200 MG: 200 TABLET ORAL at 08:19

## 2019-02-13 RX ADMIN — MIRTAZAPINE 15 MG: 15 TABLET, FILM COATED ORAL at 21:13

## 2019-02-13 RX ADMIN — AMLODIPINE BESYLATE 5 MG: 5 TABLET ORAL at 08:24

## 2019-02-13 RX ADMIN — QUETIAPINE FUMARATE 150 MG: 25 TABLET, FILM COATED ORAL at 21:13

## 2019-02-13 RX ADMIN — QUETIAPINE FUMARATE 50 MG: 25 TABLET, FILM COATED ORAL at 08:19

## 2019-02-13 RX ADMIN — DABIGATRAN ETEXILATE MESYLATE 150 MG: 150 CAPSULE ORAL at 21:14

## 2019-02-13 RX ADMIN — DEXTROSE AND SODIUM CHLORIDE 30 ML/HR: 5; 900 INJECTION, SOLUTION INTRAVENOUS at 11:54

## 2019-02-13 RX ADMIN — NICOTINE 1 PATCH: 14 PATCH, EXTENDED RELEASE TRANSDERMAL at 08:34

## 2019-02-13 NOTE — PLAN OF CARE
Problem: Patient Care Overview  Goal: Plan of Care Review  Outcome: Outcome(s) achieved Date Met: 02/13/19 02/13/19 6534   Coping/Psychosocial   Plan of Care Reviewed With patient   Plan of Care Review   Progress no change    Pt participated in clinical dysphagia assessment w/ no evidence of aspiration. Least restrictive po diet felt to be puree consistency, thin liquids.

## 2019-02-13 NOTE — PLAN OF CARE
Problem: Fall Risk (Adult)  Goal: Identify Related Risk Factors and Signs and Symptoms  Outcome: Ongoing (interventions implemented as appropriate)    Goal: Absence of Fall  Outcome: Ongoing (interventions implemented as appropriate)      Problem: Skin Injury Risk (Adult)  Goal: Identify Related Risk Factors and Signs and Symptoms  Outcome: Ongoing (interventions implemented as appropriate)    Goal: Skin Health and Integrity  Outcome: Ongoing (interventions implemented as appropriate)      Problem: Cognitive Impairment (IRF) (Adult)  Goal: Optimal/Safe Level of [place] Related to Cognitive Impairment  Outcome: Ongoing (interventions implemented as appropriate)    Goal: Optimal Quality of Life in Relation to Cognitive Impairments  Outcome: Ongoing (interventions implemented as appropriate)      Problem: Cardiac Output Decreased (Adult)  Goal: Identify Related Risk Factors and Signs and Symptoms  Outcome: Ongoing (interventions implemented as appropriate)    Goal: Effective Tissue Perfusion  Outcome: Ongoing (interventions implemented as appropriate)      Problem: Urinary Tract Infection (Adult)  Goal: Signs and Symptoms of Listed Potential Problems Will be Absent, Minimized or Managed (Urinary Tract Infection)  Outcome: Ongoing (interventions implemented as appropriate)      Problem: Patient Care Overview  Goal: Plan of Care Review  Outcome: Ongoing (interventions implemented as appropriate)      Problem: Mobility, Physical Impaired (Adult)  Goal: Identify Related Risk Factors and Signs and Symptoms  Outcome: Ongoing (interventions implemented as appropriate)    Goal: Enhanced Mobility Skills  Outcome: Ongoing (interventions implemented as appropriate)    Goal: Enhanced Functional Ability  Outcome: Ongoing (interventions implemented as appropriate)      Problem: Self-Care Deficit (Adult,Obstetrics,Pediatric)  Goal: Improved Ability to Perform BADL and IADL  Outcome: Ongoing (interventions implemented as  appropriate)

## 2019-02-13 NOTE — PROGRESS NOTES
Discharge Planning Assessment  [facility]     Patient Name: Mary Lou Graves  MRN: [id number]  Today's Date: 2/13/2019    Admit Date: 2/8/2019        Discharge Plan     Row Name 02/13/19 1428       Plan    Plan  Pt has [facility].  Pt son aware and agreeable.  SS will follow up in am.          Destination      Service Provider Request Status Selected Services Address Phone Number Fax Number    [facility] Pending - Request Sent N/A [address] [facility] [address] [phone]    [facility] [place] - Request Sent N/A [address] [facility] [address] [phone]    [facility] Pending - Request Sent N/A [address] [phone] [phone]    [facility] - Request Sent N/A [address] [facility] [address] [phone]    [facility] - Request Sent N/A [address] [facility] [address] [phone]    [facility] Pending - Request Sent N/A [address] [facility] [address] [phone]          Holly Goyal

## 2019-02-13 NOTE — PROGRESS NOTES
Nephrology  Note      Subjective       Urine out put improved. She is confused. Not eating or drinking. Sitter at bedside    Objective     Vital Signs  Temp:  [97.5 °F (36.4 °C)-97.8 °F (36.6 °C)] 97.5 °F (36.4 °C)  Heart Rate:  [67-70] 69  Resp:  [16-20] 16  BP: (126-164)/(66-81) 126/66    No intake/output data recorded.  I/O last 3 completed shifts:  In: 600 [P.O.:600]  Out: 425 [Urine:425]    Physical Examination:    General Appearance : awake, no distress  Head : normocephalic  Eyes :  no pallor  Throat : oral mucosa moist  Neck:  no JVD  Lungs : clear to auscultation  Heart : regular rhythm & normal rate, normal S1, S2, no murmur  Abdomen : soft non-tender  Extremities : no edema  Neurologic : confused, grossly no focal deficitis    Laboratory Data :      WBC WBC   Date Value Ref Range Status   02/13/2019 11.90 4.50 - 12.50 10*3/mm3 Final   02/13/2019 9.72 4.50 - 12.50 10*3/mm3 Final   02/12/2019 12.48 4.50 - 12.50 10*3/mm3 Final   02/11/2019 10.38 4.50 - 12.50 10*3/mm3 Final      HGB Hemoglobin   Date Value Ref Range Status   02/13/2019 11.8 (L) 12.0 - 16.0 g/dL Final   02/13/2019 11.3 (L) 12.0 - 16.0 g/dL Final   02/12/2019 11.3 (L) 12.0 - 16.0 g/dL Final   02/11/2019 12.0 12.0 - 16.0 g/dL Final      HCT Hematocrit   Date Value Ref Range Status   02/13/2019 35.2 (L) 37.0 - 47.0 % Final   02/13/2019 34.8 (L) 37.0 - 47.0 % Final   02/12/2019 35.4 (L) 37.0 - 47.0 % Final   02/11/2019 36.9 (L) 37.0 - 47.0 % Final      Platlets No results found for: LABPLAT   MCV MCV   Date Value Ref Range Status   02/13/2019 87.6 80.0 - 94.0 fL Final   02/13/2019 89.0 80.0 - 94.0 fL Final   02/12/2019 89.2 80.0 - 94.0 fL Final   02/11/2019 89.1 80.0 - 94.0 fL Final          Sodium Sodium   Date Value Ref Range Status   02/13/2019 137 135 - 153 mmol/L Final   02/12/2019 136 135 - 153 mmol/L Final   02/11/2019 141 135 - 153 mmol/L Final      Potassium Potassium   Date Value Ref Range Status   02/13/2019 3.7 3.5 - 5.3 mmol/L Final    02/12/2019 3.5 3.5 - 5.3 mmol/L Final   02/11/2019 3.7 3.5 - 5.3 mmol/L Final      Chloride Chloride   Date Value Ref Range Status   02/13/2019 103 99 - 112 mmol/L Final   02/12/2019 101 99 - 112 mmol/L Final   02/11/2019 101 99 - 112 mmol/L Final      CO2 CO2   Date Value Ref Range Status   02/13/2019 24.6 24.3 - 31.9 mmol/L Final   02/12/2019 28.4 24.3 - 31.9 mmol/L Final   02/11/2019 30.7 24.3 - 31.9 mmol/L Final      BUN BUN   Date Value Ref Range Status   02/13/2019 22 (H) 7 - 21 mg/dL Final   02/12/2019 27 (H) 7 - 21 mg/dL Final   02/11/2019 25 (H) 7 - 21 mg/dL Final      Creatinine Creatinine   Date Value Ref Range Status   02/13/2019 0.70 0.43 - 1.29 mg/dL Final   02/12/2019 0.81 0.43 - 1.29 mg/dL Final   02/11/2019 0.67 0.43 - 1.29 mg/dL Final      Calcium Calcium   Date Value Ref Range Status   02/13/2019 8.5 7.7 - 10.0 mg/dL Final   02/12/2019 8.9 7.7 - 10.0 mg/dL Final   02/11/2019 9.0 7.7 - 10.0 mg/dL Final      PO4 No results found for: CAPO4   Albumin Albumin   Date Value Ref Range Status   02/13/2019 3.30 (L) 3.40 - 4.80 g/dL Final   02/12/2019 3.50 3.40 - 4.80 g/dL Final   02/11/2019 3.70 3.40 - 4.80 g/dL Final      Magnesium No results found for: MG   Uric Acid No results found for: URICACID     Radiology results :     Imaging Results (last 24 hours)     ** No results found for the last 24 hours. **              Medications:        amLODIPine 5 mg Oral Daily   aspirin 81 mg Oral Daily   carBAMazepine 200 mg Oral BID   dabigatran etexilate 150 mg Oral BID   mirtazapine 15 mg Oral Nightly   nicotine 1 patch Transdermal Q24H   QUEtiapine 150 mg Oral Nightly   QUEtiapine 50 mg Oral Daily   sertraline 100 mg Oral Daily   sodium chloride 10 mL Intravenous Q12H   sodium chloride 3 mL Intravenous Q12H       sodium chloride 50 mL/hr Last Rate: 50 mL/hr (02/12/19 8832)       Assessment/Plan       Failure to thrive in adult      1. Oliguria : resolved.  creatinine is 0.7 and BUN improving.   Will change  fluids to d5wNS at 30 cc/hr and continue until tube feeds started   urine Na 34 with NS suggesting mild volume depletion    2. Diastolic CHF : compensated    3. Advanced dementia    4. HTN stable    5. Adult failure to thrive : agree with tube feed assessment    Will sign iff  I discussed the patient's findings and my recommendations with nursing staff    Gurwinder Nettles MD  02/13/19  11:39 AM

## 2019-02-13 NOTE — PROGRESS NOTES
Nutrition Services    Patient Name:  Mary Lou Graves  YOB: 1935  MRN: [id number]  Admit Date:  2/8/2019    MD consult for tube feeding assessment. Note will order tube feeding, Nutren 1.5 start @ 15 ml/hr and advance as tolerated to goal rate of 35 ml/hr.  Flush with 25 ml/hr h2o, when not receiving IVF's.     Thank you    Electronically signed by:  Beata Flores RD  02/13/19 4:28 PM

## 2019-02-13 NOTE — THERAPY EVALUATION
Acute Care - Speech Language Pathology   Swallow Initial Evaluation  [facility]   CLINICAL DYSPHAGIA ASSESSMENT     Patient Name: Mary Lou Graves  : 1935  MRN: [id number]  Today's Date: 2019  Onset of Illness/Injury or Date of Surgery: 19     Referring Physician: Servando      Admit Date: 2019    Visit Dx:     ICD-10-CM ICD-9-CM   1. Failure to thrive in adult R62.7 783.7   2. Dehydration E86.0 276.51   3. Pleural effusion, bilateral J90 511.9   4. Dementia with behavioral disturbance, unspecified dementia type F03.91 294.21     Patient Active Problem List   Diagnosis   • Acute UTI   • Failure to thrive in adult     Past Medical History:   Diagnosis Date   • Dementia    • Depression    • Hyperlipidemia    • Hypertension      Past Surgical History:   Procedure Laterality Date   • HYSTERECTOMY       Mary Lou Graves  is seen at bedside this pm on 3S to assess safety/efficacy of swallowing fnx, determine safest/least restrictive diet tolerance. Sitter is present during this assessment. Ms. Graves is referred today 2/ poor po intake.     Social History     Socioeconomic History   • Marital status:      Spouse name: Not on file   • Number of children: Not on file   • Years of education: Not on file   • Highest education level: Not on file   Social Needs   • Financial resource strain: Not on file   • Food insecurity - worry: Not on file   • Food insecurity - inability: Not on file   • Transportation needs - medical: Not on file   • Transportation needs - non-medical: Not on file   Occupational History   • Not on file   Tobacco Use   • Smoking status: Current Every Day Smoker     Packs/day: 0.50   • Smokeless tobacco: Never Used   Substance and Sexual Activity   • Alcohol use: No     Frequency: Never     Comment: denies   • Drug use: No     Comment: denies   • Sexual activity: No   Other Topics Concern   • Not on file   Social History Narrative   • Not on file      Chest xray 19 reveals  improvement in CHF/edema and bilateral airspace disease per radiologist.     Diet Orders (active) (From admission, onward)    Start     Ordered    02/09/19 1200  Dietary Nutrition Supplements Boost Plus (Ensure Enlive, Ensure Plus)  Daily With Breakfast, Lunch & Dinner      02/09/19 1158    02/09/19 1141  Diet Regular  Diet Effective Now      02/09/19 1156        Pt is observed on ra w/o complications.     Pt is repositioned upright and centered in bed to accept multiple po presentations of ice chips, solid cracker, puree and thin liquids via spoon, cup and straw. She does not self feed during this evaluation.      Facial/oral structures are symmetrical upon observation. Lingual protrusion reveals no deviation. Oral mucosa are mildly xerostomic, pink, and clean. Secretions are clear, thin, and well controlled. OROM/ASHISH is generally weak but adequate to imitate oral postures. Gag is not assessed. Volitional cough CNA as pt is unable to imitate 2/2 ams. No spontaneous cough is observed. Voice is adequate in intensity, clear in quality w/ intelligible speech. She is oriented to name only. She intermittently follows simple commands. She is pleasantly confused w/ sporadic verbalizations across this assessment. Noted premorbid baseline ams/dementia.     Upon po presentations, adequate bolus anticipation and acceptance w/ good labial seal for bolus clearance via spoon bowl, cup rim stability and suction via straw. Bolus formation, manipulation and control are generally weak w/ significantly increased oral prep time w/ solids. Mixed phasic/rotary mastication pattern and piecemeal deglutition w/ solids. A-p transit is delayed w/ moderate oral holding of all consistencies. She requires intermittent verbal cues for a-p transit. No significant oral residue. No overt s/s aspiration before the swallow.     Pharyngeal swallow is timely w/ adequate hyolaryngeal elevation per palpation. No overt s/s aspiration evidenced across this  evaluation. No silent aspiration suspected as pt is w/o changes in vocal quality, respirations or secretions post po presentations.     Impression: Per this evaluation, Ms. Graves presents w/ a moderately severe oral dysphagia w/ oral holding of all consistencies, wfl pharyngeal swallow w/o s/s aspiration. Observed oral dysphagia felt to be most likely 2/2 baseline ams/advanced dementia. No s/s indicative of silent aspiration. Least restrictive po diet felt to be puree consistency, thin liquids. She will require 1:1 feeding assistance and encouragement.      EDUCATION  The patient has been educated in the following areas:   Dysphagia (Swallowing Impairment) Modified Diet Instruction.    SLP Recommendation and Plan  1. Puree consistency, thin liquids.   2. Meds whole in puree. Crush PRN.   3. 1:1 feeding assistance and encouragement.    4. Upright and centered for all po intake.  5. NA precautions.  6. Oral care protocol.  No further formal SLP f/u warranted at this time.    D/w RNLamar, results and recommendations w/ verbal agreement.    Thank you for allowing me to participate in the care of your patient-  Meena Modi M.A., CCC-SLP    Plan of Care Reviewed With: patient  Plan of Care Review  Plan of Care Reviewed With: patient  Progress: no change    Time Calculation:     Therapy Charges for Today     Code Description Service Date Service Provider Modifiers Qty    55628321629  ST EVAL ORAL PHARYNG SWALLOW 4 2/13/2019 Meena Dawson MA,CCC-SLP GN 1          Meena Dawson MA,CCC-SLP  2/13/2019

## 2019-02-13 NOTE — PLAN OF CARE
Problem: Fall Risk (Adult)  Goal: Identify Related Risk Factors and Signs and Symptoms  Outcome: Ongoing (interventions implemented as appropriate)    Goal: Absence of Fall  Outcome: Ongoing (interventions implemented as appropriate)      Problem: Skin Injury Risk (Adult)  Goal: Identify Related Risk Factors and Signs and Symptoms  Outcome: Ongoing (interventions implemented as appropriate)    Goal: Skin Health and Integrity  Outcome: Ongoing (interventions implemented as appropriate)      Problem: Cognitive Impairment (IRF) (Adult)  Goal: Optimal/Safe Level of [place] Related to Cognitive Impairment  Outcome: Ongoing (interventions implemented as appropriate)    Goal: Optimal Quality of Life in Relation to Cognitive Impairments  Outcome: Ongoing (interventions implemented as appropriate)      Problem: Cardiac Output Decreased (Adult)  Goal: Identify Related Risk Factors and Signs and Symptoms  Outcome: Ongoing (interventions implemented as appropriate)    Goal: Effective Tissue Perfusion  Outcome: Ongoing (interventions implemented as appropriate)      Problem: Urinary Tract Infection (Adult)  Goal: Signs and Symptoms of Listed Potential Problems Will be Absent, Minimized or Managed (Urinary Tract Infection)  Outcome: Ongoing (interventions implemented as appropriate)      Problem: Patient Care Overview  Goal: Plan of Care Review  Outcome: Ongoing (interventions implemented as appropriate)    Goal: Individualization and Mutuality  Outcome: Ongoing (interventions implemented as appropriate)    Goal: Discharge Needs Assessment  Outcome: Ongoing (interventions implemented as appropriate)    Goal: Interprofessional Rounds/Family Conf  Outcome: Ongoing (interventions implemented as appropriate)      Problem: Mobility, Physical Impaired (Adult)  Goal: Identify Related Risk Factors and Signs and Symptoms  Outcome: Ongoing (interventions implemented as appropriate)    Goal: Enhanced Mobility Skills  Outcome: Ongoing  (interventions implemented as appropriate)    Goal: Enhanced Functional Ability  Outcome: Ongoing (interventions implemented as appropriate)      Problem: Self-Care Deficit (Adult,Obstetrics,Pediatric)  Goal: Improved Ability to Perform BADL and IADL  Outcome: Ongoing (interventions implemented as appropriate)

## 2019-02-13 NOTE — SIGNIFICANT NOTE
02/13/19 1121   Rehab Treatment   Discipline physical therapist   Reason Treatment Not Performed patient/family declined treatment  (Pt declined ambulation with therapy today. Will continue to follow. )

## 2019-02-13 NOTE — PROGRESS NOTES
PROGRESS NOTE         Patient Identification:  Name:  Mary Lou Graves  Age:  83 y.o.  Sex:  female  :  1935  MRN:  [id number]  Visit Number:  [id number]  Primary Care Provider:  London Merritt MD      ----------------------------------------------------------------------------------------------------------------------  Subjective       Chief Complaints:    Dementia (PATIENT WAS SEEN BY FAMILY PHYSICIAN THIS AM AND WOULD NOT GET OUT OF THE CAR. THE PLAN WAS TO ECVALUATE HER FOR NURSING HOME PLACEMENT DUE TO WORSENING DEMENTIA.)         Interval History:      The patient was comfortable and continues to be pleasantly confused. LEYLA Newton, at bedside and reports no issues overnight. Dr. Nettles evaluated the patient and believes decreased urine output is related to poor po intake. He initiated the patient on NS at 50 cc/hr and is checking urine Na. We appreciate Dr. Nettles's assistance in the care of Ms. Graves. The patient may need tube feeding if continues to have poor PO intake. Awaiting nursing home placement.     Review of Systems:    Unable to obtain due to the patients confusion  ----------------------------------------------------------------------------------------------------------------------      Objective       Current Hospital Meds:    amLODIPine 5 mg Oral Daily   aspirin 81 mg Oral Daily   carBAMazepine 200 mg Oral BID   dabigatran etexilate 150 mg Oral BID   mirtazapine 15 mg Oral Nightly   nicotine 1 patch Transdermal Q24H   QUEtiapine 150 mg Oral Nightly   QUEtiapine 50 mg Oral Daily   sertraline 100 mg Oral Daily   sodium chloride 10 mL Intravenous Q12H   sodium chloride 3 mL Intravenous Q12H       sodium chloride 50 mL/hr Last Rate: 50 mL/hr (19 1517)     ----------------------------------------------------------------------------------------------------------------------    Vital Signs:  Temp:  [97.5 °F (36.4 °C)-97.8 °F (36.6 °C)] 97.5 °F (36.4 °C)  Heart Rate:  [67-70]  70  Resp:  [18-20] 20  BP: (129-164)/(75-81) 164/81  Mean Arterial Pressure (Non-Invasive) for the past 24 hrs (Last 3 readings):   Noninvasive MAP (mmHg)   02/13/19 0256 105   02/12/19 1800 116     SpO2 Percentage    02/12/19 1800 02/13/19 0256 02/13/19 0822   SpO2: 96% 97% 98%     SpO2:  [96 %-98 %] 98 %  on   ;   Device (Oxygen Therapy): room air    Body mass index is 13.7 kg/m².  Wt Readings from Last 3 Encounters:   02/13/19 37.3 kg (82 lb 4.8 oz)   12/21/18 44 kg (97 lb)   12/17/18 44 kg (97 lb)        Intake/Output Summary (Last 24 hours) at 2/13/2019 0949  Last data filed at 2/13/2019 0256  Gross per 24 hour   Intake 360 ml   Output 425 ml   Net -65 ml     Diet Regular  ----------------------------------------------------------------------------------------------------------------------    Physical Exam:     Constitutional:  Well-developed and well-nourished.  No respiratory distress. Calm. Confused.      HENT:  Head: Normocephalic and atraumatic.  Mouth:  Moist mucous membranes.    Eyes:  Conjunctivae and EOM are normal.  No scleral icterus.  Neck:  Neck supple.  No JVD present.    Cardiovascular:  Normal rate, regular rhythm and normal heart sounds with no murmur. No edema.  Pulmonary/Chest:  No respiratory distress, no wheezes, no crackles, with normal breath sounds and good air movement.  Abdominal:  Soft.  Bowel sounds are normal.  No distension and no tenderness.   Musculoskeletal:  No edema, no tenderness, and no deformity.  No swelling or redness of joints.  Neurological:  Disoriented.  No facial droop.  No slurred speech.   Skin:  Skin is warm and dry.  No rash noted.  No pallor.   Psychiatric:  Normal mood and affect.  Behavior is normal.    ----------------------------------------------------------------------------------------------------------------------  I have personally reviewed the EKG/Telemetry strips    ----------------------------------------------------------------------------------------------------------------------        Results from last 7 days   Lab Units 02/09/19  0038   CHOLESTEROL mg/dL 189   TRIGLYCERIDES mg/dL 159*   HDL CHOL mg/dL 26*   LDL CHOL mg/dL 131*       Results from last 7 days   Lab Units 02/13/19 0332 02/12/19 0333 02/12/19 0332 02/11/19  0437   CRP mg/dL 1.62*  --  2.08* 2.57*   WBC 10*3/mm3 9.72 12.48  --  10.38   HEMOGLOBIN g/dL 11.3* 11.3*  --  12.0   HEMATOCRIT % 34.8* 35.4*  --  36.9*   MCV fL 89.0 89.2  --  89.1   MCHC g/dL 32.5* 31.9*  --  32.5*   PLATELETS 10*3/mm3 350 336  --  329     Results from last 7 days   Lab Units 02/13/19 0332 02/12/19  0655 02/11/19  0437   SODIUM mmol/L 137 136 141   POTASSIUM mmol/L 3.7 3.5 3.7   CHLORIDE mmol/L 103 101 101   CO2 mmol/L 24.6 28.4 30.7   BUN mg/dL 22* 27* 25*   CREATININE mg/dL 0.70 0.81 0.67   EGFR IF NONAFRICN AM mL/min/1.73 80 68 84   CALCIUM mg/dL 8.5 8.9 9.0   GLUCOSE mg/dL 120* 141* 112*   ALBUMIN g/dL 3.30* 3.50 3.70   BILIRUBIN mg/dL 0.4 0.3 0.4   ALK PHOS U/L 85 93 96   AST (SGOT) U/L 23 25 40*   ALT (SGPT) U/L 28 37* 54*   Estimated Creatinine Clearance: 32.5 mL/min (by C-G formula based on SCr of 0.7 mg/dL).  No results found for: AMMONIA    No results found for: HGBA1C, POCGLU  No results found for: HGBA1C  Lab Results   Component Value Date    TSH 4.532 02/09/2019     Pain Management Panel     Pain Management Panel Latest Ref Rng & Units 2/13/2019 2/8/2019    CREATININE UR mg/dL 156.7 -    AMPHETAMINES SCREEN, URINE Negative - Negative    BARBITURATES SCREEN Negative - Negative    BENZODIAZEPINE SCREEN, URINE Negative - Negative    BUPRENORPHINE Negative - Negative    COCAINE SCREEN, URINE Negative - Negative    METHADONE SCREEN, URINE Negative - Negative          I have personally reviewed the above laboratory results.    ----------------------------------------------------------------------------------------------------------------------  Imaging Results (last 24 hours)     ** No results found for the last 24 hours. **        I have personally reviewed the above radiology results.   ----------------------------------------------------------------------------------------------------------------------    Assessment/Plan       Assessment/Plan     ASSESSMENT:      1. Failure to thrive  2. Social issues  3. CHF exacerbation     PLAN:    The patient was comfortable and continues to be pleasantly confused. Lamar RN, at bedside and reports no issues overnight. Urine output has increased. Dr. Nettles evaluated the patient and believes decreased urine output is related to poor po intake. He initiated the patient on NS at 50 cc/hr and is checking urine Na. We appreciate Dr. Nettles's assistance in the care of Ms. Graves. The patient may need tube feeding if continues to have poor PO intake. Nutrition reconsulted for tube feeding assessment. Speech evaluation ordered.     CRP improved down to 1.62 with a normal white count. Stable BNP.     Repeat chest xray shows improvement of CHF/edema and bilateral airspace disease. Echo shows an EF of 61-65%.      Patient on Pradaxa and SCDs.      Discharge planning in place per case management awaiting nursing home placement.    CBC, CRP, CMP ordered daily.       Code Status:   Code Status and Medical Interventions:   Ordered at: 02/08/19 2058     Code Status:    CPR     Medical Interventions (Level of Support Prior to Arrest):    Full       Fernanda lA PA-C  02/13/19  9:49 AM

## 2019-02-14 VITALS
BODY MASS INDEX: 14.16 KG/M2 | RESPIRATION RATE: 18 BRPM | HEART RATE: 68 BPM | SYSTOLIC BLOOD PRESSURE: 144 MMHG | DIASTOLIC BLOOD PRESSURE: 86 MMHG | TEMPERATURE: 97.6 F | WEIGHT: 85 LBS | OXYGEN SATURATION: 94 % | HEIGHT: 65 IN

## 2019-02-14 LAB
ALBUMIN SERPL-MCNC: 3.3 G/DL (ref 3.4–4.8)
ALBUMIN/GLOB SERPL: 1.2 G/DL (ref 1.5–2.5)
ALP SERPL-CCNC: 83 U/L (ref 35–104)
ALT SERPL W P-5'-P-CCNC: 31 U/L (ref 10–36)
ANION GAP SERPL CALCULATED.3IONS-SCNC: 5 MMOL/L (ref 3.6–11.2)
AST SERPL-CCNC: 31 U/L (ref 10–30)
BASOPHILS # BLD AUTO: 0.02 10*3/MM3 (ref 0–0.3)
BASOPHILS NFR BLD AUTO: 0.2 % (ref 0–2)
BILIRUB SERPL-MCNC: 0.4 MG/DL (ref 0.2–1.8)
BUN BLD-MCNC: 16 MG/DL (ref 7–21)
BUN/CREAT SERPL: 24.2 (ref 7–25)
CALCIUM SPEC-SCNC: 8.5 MG/DL (ref 7.7–10)
CHLORIDE SERPL-SCNC: 104 MMOL/L (ref 99–112)
CO2 SERPL-SCNC: 27 MMOL/L (ref 24.3–31.9)
CREAT BLD-MCNC: 0.66 MG/DL (ref 0.43–1.29)
CRP SERPL-MCNC: 0.9 MG/DL (ref 0–0.99)
DEPRECATED RDW RBC AUTO: 44.5 FL (ref 37–54)
EOSINOPHIL # BLD AUTO: 0.11 10*3/MM3 (ref 0–0.7)
EOSINOPHIL NFR BLD AUTO: 1.1 % (ref 0–7)
ERYTHROCYTE [DISTWIDTH] IN BLOOD BY AUTOMATED COUNT: 13.9 % (ref 11.5–14.5)
GFR SERPL CREATININE-BSD FRML MDRD: 86 ML/MIN/1.73
GLOBULIN UR ELPH-MCNC: 2.7 GM/DL
GLUCOSE BLD-MCNC: 126 MG/DL (ref 70–110)
HCT VFR BLD AUTO: 35.8 % (ref 37–47)
HGB BLD-MCNC: 11.3 G/DL (ref 12–16)
IMM GRANULOCYTES # BLD AUTO: 0.03 10*3/MM3 (ref 0–0.03)
IMM GRANULOCYTES NFR BLD AUTO: 0.3 % (ref 0–0.5)
LYMPHOCYTES # BLD AUTO: 0.99 10*3/MM3 (ref 1–3)
LYMPHOCYTES NFR BLD AUTO: 10 % (ref 16–46)
MCH RBC QN AUTO: 28.1 PG (ref 27–33)
MCHC RBC AUTO-ENTMCNC: 31.6 G/DL (ref 33–37)
MCV RBC AUTO: 89.1 FL (ref 80–94)
MONOCYTES # BLD AUTO: 0.84 10*3/MM3 (ref 0.1–0.9)
MONOCYTES NFR BLD AUTO: 8.5 % (ref 0–12)
NEUTROPHILS # BLD AUTO: 7.92 10*3/MM3 (ref 1.4–6.5)
NEUTROPHILS NFR BLD AUTO: 79.9 % (ref 40–75)
OSMOLALITY SERPL CALC.SUM OF ELEC: 274.7 MOSM/KG (ref 273–305)
PLATELET # BLD AUTO: 370 10*3/MM3 (ref 130–400)
PMV BLD AUTO: 9.4 FL (ref 6–10)
POTASSIUM BLD-SCNC: 3.6 MMOL/L (ref 3.5–5.3)
PROT SERPL-MCNC: 6 G/DL (ref 6–8)
RBC # BLD AUTO: 4.02 10*6/MM3 (ref 4.2–5.4)
SODIUM BLD-SCNC: 136 MMOL/L (ref 135–153)
WBC NRBC COR # BLD: 9.91 10*3/MM3 (ref 4.5–12.5)

## 2019-02-14 PROCEDURE — 86140 C-REACTIVE PROTEIN: CPT | Performed by: PHYSICIAN ASSISTANT

## 2019-02-14 PROCEDURE — 80053 COMPREHEN METABOLIC PANEL: CPT | Performed by: PHYSICIAN ASSISTANT

## 2019-02-14 PROCEDURE — 85025 COMPLETE CBC W/AUTO DIFF WBC: CPT | Performed by: PHYSICIAN ASSISTANT

## 2019-02-14 RX ADMIN — DABIGATRAN ETEXILATE MESYLATE 150 MG: 150 CAPSULE ORAL at 08:50

## 2019-02-14 RX ADMIN — SERTRALINE 100 MG: 50 TABLET, FILM COATED ORAL at 08:50

## 2019-02-14 RX ADMIN — CARBAMAZEPINE 200 MG: 200 TABLET ORAL at 08:50

## 2019-02-14 RX ADMIN — QUETIAPINE FUMARATE 50 MG: 25 TABLET, FILM COATED ORAL at 08:50

## 2019-02-14 RX ADMIN — NICOTINE 1 PATCH: 14 PATCH, EXTENDED RELEASE TRANSDERMAL at 08:53

## 2019-02-14 RX ADMIN — ASPIRIN 81 MG: 81 TABLET, CHEWABLE ORAL at 08:50

## 2019-02-14 RX ADMIN — AMLODIPINE BESYLATE 5 MG: 5 TABLET ORAL at 11:26

## 2019-02-14 NOTE — PROGRESS NOTES
Discharge Planning Assessment  [facility]     Patient Name: Mary Lou Graves  MRN: [id number]  Today's Date: 2/14/2019    Admit Date: 2/8/2019        Discharge Plan     Row Name 02/14/19 1347       Plan    Final Discharge Disposition Code  03 - skilled nursing facility (SNF)    Final Note  Pt to be discharged to [facility] on this date.  Faciity aware and agreeable per Sophia.  Pt's son aware and agreeable.  Pt to be transported via EMS.         Destination      Service Provider Request Status Selected Services Address Phone Number Fax Number    [facility] Accepted N/A [address] [address] [phone]    [facility] Pending - Request Sent N/A [address] [facility] [address] [phone]    [facility] Pending - Request Sent N/A [address] [place] [id number] [phone]    [facility] Pending - Request Sent N/A [address] [place] [phone]    [facility] Pending - Request Sent N/A [address] [facility] [address] [phone]    [facility] Pending - Request Sent N/A [address] [facility] [address] [phone]              Holly Goyal

## 2019-02-14 NOTE — DISCHARGE SUMMARY
DISCHARGE SUMMARY        Patient Identification:  Name:  Mary Lou Graves  Age:  83 y.o.  Sex:  female  :  1935  MRN:  [id number]  Visit Number:  [id number]    Date of Admission: 2019  Date of Discharge:  2019    PCP: London Merritt MD    Discharging Provider: NIMA Gallardo      Discharge Diagnoses     Failure to thrive in an adult      Consults/Procedures     Consults:   Consults     Date and Time Order Name Status Description    2019 0954 Inpatient Nephrology Consult Completed     2019 2018 IP General Consult (Use specialty-specific consult if known)            Procedures Performed:         History of Presenting Illness     Patient is a 83 y.o. female presented to [facility] with confusion and decreased PO intake.  Please see the admitting history and physical for further details.      Hospital Course     Patient was admitted to the observation unit for further monitoring and evaluation. Patient was initiated on IV fluids for a short time until PO intake increase. Patient currently does well with PO intake if fed 1:1. However due to son's inability to care for her at home patient was discharged to skilled nursing facility for further care.     Discharge Vitals/Physical Examination     Vital Signs:  Temp:  [97.5 °F (36.4 °C)-97.6 °F (36.4 °C)] 97.6 °F (36.4 °C)  Heart Rate:  [69-71] 69  Resp:  [18] 18  BP: (124-164)/(65-90) 124/65  Mean Arterial Pressure (Non-Invasive) for the past 24 hrs (Last 3 readings):   Noninvasive MAP (mmHg)   19 1002 94   19 0635 110   19 1406 113     SpO2 Percentage    196 19 0312 19 1002   SpO2: 93% 93% 94%     SpO2:  [93 %-96 %] 94 %  on   ;   Device (Oxygen Therapy): room air    Body mass index is 14.14 kg/m².  Wt Readings from Last 3 Encounters:   19 38.6 kg (85 lb)   18 44 kg (97 lb)   18 44 kg (97 lb)         Physical Exam:      Constitutional:  Thin, frail. No respiratory  distress. Calm. Confused.      HENT:  Head: Normocephalic and atraumatic.  Mouth:  Moist mucous membranes.    Eyes:  Conjunctivae and EOM are normal.  No scleral icterus.  Neck:  Neck supple.  No JVD present.    Cardiovascular:  Normal rate, regular rhythm and normal heart sounds with no murmur. No edema.  Pulmonary/Chest:  No respiratory distress, no wheezes, no crackles, with normal breath sounds and good air movement.  Abdominal:  Soft.  Bowel sounds are normal.  No distension and no tenderness.   Musculoskeletal:  No edema, no tenderness, and no deformity.  No swelling or redness of joints.  Neurological:  Disoriented.  No facial droop.  No slurred speech.   Skin:  Skin is warm and dry.  No rash noted.  No pallor.   Psychiatric:  Normal mood and affect.  Behavior is normal.      Pertinent Laboratory/Radiology Results     Pertinent Laboratory Results:          Results from last 7 days   Lab Units 02/09/19  0038   CHOLESTEROL mg/dL 189   TRIGLYCERIDES mg/dL 159*   HDL CHOL mg/dL 26*   LDL CHOL mg/dL 131*       Results from last 7 days   Lab Units 02/14/19  0439 02/13/19  1048 02/13/19  0332   CRP mg/dL 0.90 1.65* 1.62*   WBC 10*3/mm3 9.91 11.90 9.72   HEMOGLOBIN g/dL 11.3* 11.8* 11.3*   HEMATOCRIT % 35.8* 35.2* 34.8*   MCV fL 89.1 87.6 89.0   MCHC g/dL 31.6* 33.5 32.5*   PLATELETS 10*3/mm3 370 260 350     Results from last 7 days   Lab Units 02/14/19  0439 02/13/19  1048 02/13/19  0332   SODIUM mmol/L 136 135 137   POTASSIUM mmol/L 3.6 5.3 3.7   CHLORIDE mmol/L 104 103 103   CO2 mmol/L 27.0 24.0* 24.6   BUN mg/dL 16 18 22*   CREATININE mg/dL 0.66 0.64 0.70   EGFR IF NONAFRICN AM mL/min/1.73 86 89 80   CALCIUM mg/dL 8.5 8.4 8.5   GLUCOSE mg/dL 126* 134* 120*   ALBUMIN g/dL 3.30* 3.60 3.30*   BILIRUBIN mg/dL 0.4 0.4 0.4   ALK PHOS U/L 83 83 85   AST (SGOT) U/L 31* 44* 23   ALT (SGPT) U/L 31 38* 28   Estimated Creatinine Clearance: 33.6 mL/min (by C-G formula based on SCr of 0.66 mg/dL).  No results found for:  AMMONIA    No results found for: HGBA1C, POCGLU  No results found for: HGBA1C  Lab Results   Component Value Date    TSH 4.532 02/09/2019                      Pain Management Panel     Pain Management Panel Latest Ref Rng & Units 2/13/2019 2/8/2019    CREATININE UR mg/dL 156.7 -    AMPHETAMINES SCREEN, URINE Negative - Negative    BARBITURATES SCREEN Negative - Negative    BENZODIAZEPINE SCREEN, URINE Negative - Negative    BUPRENORPHINE Negative - Negative    COCAINE SCREEN, URINE Negative - Negative    METHADONE SCREEN, URINE Negative - Negative          Pertinent Radiology Results:  Imaging Results (all)     Procedure Component Value Units Date/Time    XR Abdomen KUB [[id number]] Collected:  02/14/19 0846     Updated:  02/14/19 0849    Narrative:       EXAMINATION: XR ABDOMEN KUB-      TECHNIQUE: Single KUB     CLINICAL INDICATION:     NG Tube Placement; R62.7-Adult failure to  thrive; E86.0-Dehydration; G86-Ctabqnr effusion, not elsewhere  classified; F03.91-Unspecified dementia with behavioral disturbance      COMPARISON:    None available.     FINDINGS:    No bowel obstruction.  Mild ileus.  Cardiomegaly.  Tip of feeding tube at the level of GE junction above level of  diaphragm.            Impression:       Tip of feeding tube at level of GE junction just above diaphragm.     This report was finalized on 2/14/2019 8:47 AM by Dr. Derik Arzola MD.       XR Abdomen KUB [[id number]] Collected:  02/14/19 0846     Updated:  02/14/19 0849    Narrative:       EXAMINATION: XR ABDOMEN KUB-      TECHNIQUE: Single KUB     CLINICAL INDICATION:     Ng Tube Placement; R62.7-Adult failure to  thrive; E86.0-Dehydration; N88-Vgwrkzs effusion, not elsewhere  classified; F03.91-Unspecified dementia with behavioral disturbance      COMPARISON:    2/13/2019 5:26 PM     FINDINGS:    Tip of feeding tube appears to terminate at the level of GE junction and  just above diaphragm.  Bowel gas pattern unremarkable.  Stable bony  structures.  Stable appearance of lower chest.            Impression:       Tip of feeding tube at the level of GE junction and likely just above  the diaphragm.     This report was finalized on 2/14/2019 8:46 AM by Dr. Derik Arzola MD.       XR Chest 1 View [[id number]] Collected:  02/11/19 0823     Updated:  02/11/19 0825    Narrative:       EXAMINATION: XR CHEST 1 VW-      CLINICAL INDICATION:     shortness of breath; R62.7-Adult failure to  thrive; E86.0-Dehydration; V51-Kytmflw effusion, not elsewhere  classified; F03.91-Unspecified dementia with behavioral disturbance     TECHNIQUE:  XR CHEST 1 VW-      COMPARISON: 2/8/2019      FINDINGS:   Prior CABG. Left pacer device noted. Interstitial edema has improved.  Vascular congestion improved. Bilateral opacities improved with improved  aeration noted. No effusion or pneumothorax. Chest otherwise stable.       Impression:       Improvement in CHF/edema and bilateral airspace disease.     This report was finalized on 2/11/2019 8:23 AM by Dr. Derik Arzola MD.       XR Chest 1 View [[id number]] Collected:  02/08/19 1616     Updated:  02/08/19 1620    Narrative:       XR CHEST 1 VW-     CLINICAL INDICATION: disoriented          COMPARISON: 12/22/2018      TECHNIQUE: Single frontal view of the chest.     FINDINGS:     Bibasilar effusions and minimal bibasilar airspace disease  The cardiac silhouette is normal. The pulmonary vasculature is  unremarkable.  There is no evidence of an acute osseous abnormality.   There are no suspicious-appearing parenchymal soft tissue nodules.            Impression:       Bibasilar effusions and minimal bibasilar airspace disease         This report was finalized on 2/8/2019 4:18 PM by Dr. Yanick Ford MD.             Test Results Pending at Discharge:      Discharge Disposition/Discharge Medications/Discharge Appointments     Discharge Disposition:   Skilled Nursing Facility (DC - External)    Condition at Discharge:  Stable, much  improved with no issues today    Code Status While Inpatient:  Code Status and Medical Interventions:   Ordered at: 02/08/19 2058     Code Status:    CPR     Medical Interventions (Level of Support Prior to Arrest):    Full       Discharge Medications:     Discharge Medications      Continue These Medications      Instructions Start Date   amLODIPine 5 MG tablet  Commonly known as:  NORVASC   5 mg, Oral, Daily      aspirin 81 MG chewable tablet   81 mg, Oral, Daily      carBAMazepine 200 MG tablet  Commonly known as:  TEGretol   200 mg, Oral, 2 Times Daily      dabigatran etexilate 150 MG capsu  Commonly known as:  PRADAXA   150 mg, Oral, 2 Times Daily      ibuprofen 400 MG tablet  Commonly known as:  ADVIL,MOTRIN   400 mg, Oral, Every 6 Hours PRN      mirtazapine 15 MG disintegrating tablet  Commonly known as:  REMERON SOL-TAB   15 mg, Oral, Nightly      nicotine 14 MG/24HR patch  Commonly known as:  NICODERM CQ   1 patch, Transdermal, Every 24 Hours      polyethyl glycol-propyl glycol 0.4-0.3 % solution ophthalmic solution  Commonly known as:  SYSTANE   1 drop, Both Eyes, 3 Times Daily      QUEtiapine 50 MG tablet  Commonly known as:  SEROquel   50 mg, Oral, Every Morning      QUEtiapine 100 MG tablet  Commonly known as:  SEROquel   150 mg, Oral, Nightly      sertraline 100 MG tablet  Commonly known as:  ZOLOFT   100 mg, Oral, Daily             Discharge Diet:  Pureed, 1:1 feeds    Discharge Activity:  As tolerated    Discharge Appointments:      Follow-up Information     London Merritt MD Follow up in 1 week(s).    Specialty:  Family Medicine  Contact information:  [address] [place] [id number] [phone]                           Maribeth Redd, APRN  02/14/19  11:52 AM          Please note that this discharge summary required more than 30 minutes to complete.

## 2019-02-14 NOTE — PLAN OF CARE
Problem: Fall Risk (Adult)  Goal: Absence of Fall  Outcome: Ongoing (interventions implemented as appropriate)      Problem: Skin Injury Risk (Adult)  Goal: Skin Health and Integrity  Outcome: Ongoing (interventions implemented as appropriate)      Problem: Urinary Tract Infection (Adult)  Goal: Signs and Symptoms of Listed Potential Problems Will be Absent, Minimized or Managed (Urinary Tract Infection)  Outcome: Ongoing (interventions implemented as appropriate)

## 2019-02-15 ENCOUNTER — EPISODE CHANGES (OUTPATIENT)
Dept: CASE MANAGEMENT | Facility: OTHER | Age: 84
End: 2019-02-15

## 2019-02-18 ENCOUNTER — PATIENT OUTREACH (OUTPATIENT)
Dept: CASE MANAGEMENT | Facility: OTHER | Age: 84
End: 2019-02-18

## 2019-02-18 NOTE — OUTREACH NOTE
Skilled Nursing Facility Discharge Flowsheet:     Skilled Nursing Facility Discharge Assessment 2/18/2019   Acute Facility Discharged From [place]   Acute Discharge Date 2/14/2019   Name of the Skilled Nursing Facility? [facility]   Tier Level of the Skilled Nursing Facility 2   Purpose of SNF Admission PT;OT   Estimated length of stay for the patient? TBD   Who is the insurance provider or payor of patient stay? Medicare

## 2019-03-16 PROCEDURE — 99284 EMERGENCY DEPT VISIT MOD MDM: CPT

## 2019-03-17 ENCOUNTER — HOSPITAL ENCOUNTER (EMERGENCY)
Facility: HOSPITAL | Age: 84
Discharge: HOME | End: 2019-03-17
Attending: FAMILY MEDICINE | Admitting: FAMILY MEDICINE

## 2019-03-17 ENCOUNTER — APPOINTMENT (OUTPATIENT)
Dept: CT IMAGING | Facility: HOSPITAL | Age: 84
End: 2019-03-17

## 2019-03-17 VITALS
RESPIRATION RATE: 16 BRPM | BODY MASS INDEX: 12.46 KG/M2 | WEIGHT: 92 LBS | OXYGEN SATURATION: 100 % | HEART RATE: 66 BPM | SYSTOLIC BLOOD PRESSURE: 146 MMHG | HEIGHT: 72 IN | DIASTOLIC BLOOD PRESSURE: 76 MMHG | TEMPERATURE: 97.8 F

## 2019-03-17 DIAGNOSIS — S09.90XA CLOSED HEAD INJURY, INITIAL ENCOUNTER: Primary | ICD-10-CM

## 2019-03-17 PROCEDURE — 70450 CT HEAD/BRAIN W/O DYE: CPT

## 2019-03-17 PROCEDURE — 70450 CT HEAD/BRAIN W/O DYE: CPT | Performed by: RADIOLOGY

## 2019-03-17 NOTE — ED PROVIDER NOTES
Subjective     History provided by:  Patient   used: No    Fall   Mechanism of injury: fall    Injury location:  Head/neck  Head/neck injury location:  Head  Incident location:  Nursing home  Arrived directly from scene: yes    Fall:     Fall occurred:  Tripped    Impact surface:  Hard floor  Suspicion of alcohol use: no    Suspicion of drug use: no    Associated symptoms: no abdominal pain, no back pain, no difficulty breathing, no nausea and no seizures    Risk factors: no AICD, no anticoagulation therapy, no pacemaker and no steroid use        Review of Systems   Constitutional: Negative.    HENT: Negative.    Eyes: Negative.    Respiratory: Negative.    Gastrointestinal: Negative for abdominal pain and nausea.   Endocrine: Negative.    Genitourinary: Negative.    Musculoskeletal: Negative for back pain.   Skin: Negative.    Allergic/Immunologic: Negative.    Neurological: Negative.  Negative for seizures.   Hematological: Negative.    Psychiatric/Behavioral: Positive for confusion and decreased concentration.   All other systems reviewed and are negative.      Past Medical History:   Diagnosis Date   • Dementia    • Depression    • Hyperlipidemia    • Hypertension        No Known Allergies    Past Surgical History:   Procedure Laterality Date   • HYSTERECTOMY         History reviewed. No pertinent family history.    Social History     Socioeconomic History   • Marital status:      Spouse name: Not on file   • Number of children: Not on file   • Years of education: Not on file   • Highest education level: Not on file   Tobacco Use   • Smoking status: Current Every Day Smoker     Packs/day: 0.50   • Smokeless tobacco: Never Used   Substance and Sexual Activity   • Alcohol use: No     Frequency: Never     Comment: denies   • Drug use: No     Comment: denies   • Sexual activity: No           Objective   Physical Exam   Constitutional: She appears well-developed and well-nourished.   HENT:    Head: Normocephalic.   Eyes: Pupils are equal, round, and reactive to light.   Neck: Normal range of motion. Neck supple.   Cardiovascular: Normal rate, regular rhythm, normal heart sounds and intact distal pulses.   Pulmonary/Chest: Effort normal and breath sounds normal.   Abdominal: Soft. Bowel sounds are normal.   Neurological: She is alert.   Disoriented per baseline   Skin: Skin is warm and dry. Capillary refill takes less than 2 seconds.   Psychiatric:   Flat affect, confusion   Nursing note and vitals reviewed.      Procedures           ED Course  ED Course as of Mar 17 0133   Sun Mar 17, 2019   0132 Age appropriate atrophy and chronic microvascular change CT Head Without Contrast [KK]      ED Course User Index  [KK] Suzy Oro, NIMA                  MDM  Number of Diagnoses or Management Options  Closed head injury, initial encounter: new and requires workup     Amount and/or Complexity of Data Reviewed  Tests in the radiology section of CPT®: reviewed and ordered    Risk of Complications, Morbidity, and/or Mortality  Presenting problems: moderate  Diagnostic procedures: moderate  Management options: moderate    Patient Progress  Patient progress: improved        Final diagnoses:   Closed head injury, initial encounter            Suzy Oro, NIMA  03/17/19 0133

## 2019-03-28 ENCOUNTER — APPOINTMENT (OUTPATIENT)
Dept: GENERAL RADIOLOGY | Facility: HOSPITAL | Age: 84
End: 2019-03-28

## 2019-03-28 ENCOUNTER — HOSPITAL ENCOUNTER (INPATIENT)
Facility: HOSPITAL | Age: 84
LOS: 5 days | Discharge: NURSING, REHAB OR LONG-TERM CARE | End: 2019-04-02
Attending: EMERGENCY MEDICINE | Admitting: ORTHOPAEDIC SURGERY

## 2019-03-28 ENCOUNTER — ANESTHESIA EVENT (OUTPATIENT)
Dept: PERIOP | Facility: HOSPITAL | Age: 84
End: 2019-03-28

## 2019-03-28 ENCOUNTER — ANESTHESIA (OUTPATIENT)
Dept: PERIOP | Facility: HOSPITAL | Age: 84
End: 2019-03-28

## 2019-03-28 DIAGNOSIS — S72.141A CLOSED DISPLACED INTERTROCHANTERIC FRACTURE OF RIGHT FEMUR, INITIAL ENCOUNTER (HCC): ICD-10-CM

## 2019-03-28 DIAGNOSIS — S72.144A CLOSED NONDISPLACED INTERTROCHANTERIC FRACTURE OF RIGHT FEMUR, INITIAL ENCOUNTER (HCC): Primary | ICD-10-CM

## 2019-03-28 DIAGNOSIS — N30.90 CYSTITIS: ICD-10-CM

## 2019-03-28 PROBLEM — S72.143A INTERTROCHANTERIC FRACTURE (HCC): Status: ACTIVE | Noted: 2019-03-28

## 2019-03-28 LAB
ABO GROUP BLD: NORMAL
ABO GROUP BLD: NORMAL
ALBUMIN SERPL-MCNC: 2.99 G/DL (ref 3.5–5.2)
ALBUMIN/GLOB SERPL: 1.2 G/DL
ALP SERPL-CCNC: 115 U/L (ref 39–117)
ALT SERPL W P-5'-P-CCNC: 10 U/L (ref 1–33)
ANION GAP SERPL CALCULATED.3IONS-SCNC: 9.5 MMOL/L
AST SERPL-CCNC: 15 U/L (ref 1–32)
BACTERIA UR QL AUTO: ABNORMAL /HPF
BASOPHILS # BLD AUTO: 0.02 10*3/MM3 (ref 0–0.2)
BASOPHILS NFR BLD AUTO: 0.2 % (ref 0–1.5)
BILIRUB SERPL-MCNC: 0.3 MG/DL (ref 0.2–1.2)
BILIRUB UR QL STRIP: NEGATIVE
BLD GP AB SCN SERPL QL: NEGATIVE
BUN BLD-MCNC: 15 MG/DL (ref 8–23)
BUN/CREAT SERPL: 22.1 (ref 7–25)
CALCIUM SPEC-SCNC: 8.2 MG/DL (ref 8.6–10.5)
CARBAMAZEPINE SERPL-MCNC: 5 MCG/ML (ref 4–12)
CHLORIDE SERPL-SCNC: 99 MMOL/L (ref 98–107)
CLARITY UR: ABNORMAL
CO2 SERPL-SCNC: 26.5 MMOL/L (ref 22–29)
COLOR UR: YELLOW
CREAT BLD-MCNC: 0.68 MG/DL (ref 0.57–1)
DEPRECATED RDW RBC AUTO: 50.8 FL (ref 37–54)
EOSINOPHIL # BLD AUTO: 0.07 10*3/MM3 (ref 0–0.4)
EOSINOPHIL NFR BLD AUTO: 0.8 % (ref 0.3–6.2)
ERYTHROCYTE [DISTWIDTH] IN BLOOD BY AUTOMATED COUNT: 15.7 % (ref 12.3–15.4)
GFR SERPL CREATININE-BSD FRML MDRD: 83 ML/MIN/1.73
GLOBULIN UR ELPH-MCNC: 2.4 GM/DL
GLUCOSE BLD-MCNC: 170 MG/DL (ref 65–99)
GLUCOSE UR STRIP-MCNC: NEGATIVE MG/DL
HCT VFR BLD AUTO: 27.1 % (ref 34–46.6)
HGB BLD-MCNC: 8.3 G/DL (ref 12–15.9)
HGB UR QL STRIP.AUTO: ABNORMAL
HYALINE CASTS UR QL AUTO: ABNORMAL /LPF
IMM GRANULOCYTES # BLD AUTO: 0.01 10*3/MM3 (ref 0–0.05)
IMM GRANULOCYTES NFR BLD AUTO: 0.1 % (ref 0–0.5)
KETONES UR QL STRIP: NEGATIVE
LEUKOCYTE ESTERASE UR QL STRIP.AUTO: ABNORMAL
LYMPHOCYTES # BLD AUTO: 1.09 10*3/MM3 (ref 0.7–3.1)
LYMPHOCYTES NFR BLD AUTO: 12.8 % (ref 19.6–45.3)
MCH RBC QN AUTO: 28 PG (ref 26.6–33)
MCHC RBC AUTO-ENTMCNC: 30.6 G/DL (ref 31.5–35.7)
MCV RBC AUTO: 91.6 FL (ref 79–97)
MONOCYTES # BLD AUTO: 0.72 10*3/MM3 (ref 0.1–0.9)
MONOCYTES NFR BLD AUTO: 8.4 % (ref 5–12)
NEUTROPHILS # BLD AUTO: 6.62 10*3/MM3 (ref 1.4–7)
NEUTROPHILS NFR BLD AUTO: 77.7 % (ref 42.7–76)
NITRITE UR QL STRIP: POSITIVE
PH UR STRIP.AUTO: 6.5 [PH] (ref 5–8)
PLATELET # BLD AUTO: 301 10*3/MM3 (ref 140–450)
PMV BLD AUTO: 8.7 FL (ref 6–12)
POTASSIUM BLD-SCNC: 3.8 MMOL/L (ref 3.5–5.2)
PROT SERPL-MCNC: 5.4 G/DL (ref 6–8.5)
PROT UR QL STRIP: NEGATIVE
RBC # BLD AUTO: 2.96 10*6/MM3 (ref 3.77–5.28)
RBC # UR: ABNORMAL /HPF
REF LAB TEST METHOD: ABNORMAL
RH BLD: POSITIVE
RH BLD: POSITIVE
SODIUM BLD-SCNC: 135 MMOL/L (ref 136–145)
SP GR UR STRIP: 1.01 (ref 1–1.03)
SQUAMOUS #/AREA URNS HPF: ABNORMAL /HPF
T&S EXPIRATION DATE: NORMAL
T3FREE SERPL-MCNC: 2.29 PG/ML (ref 2–4.4)
T4 FREE SERPL-MCNC: 0.79 NG/DL (ref 0.93–1.7)
TSH SERPL DL<=0.05 MIU/L-ACNC: 7.07 MIU/ML (ref 0.27–4.2)
UROBILINOGEN UR QL STRIP: ABNORMAL
WBC NRBC COR # BLD: 8.53 10*3/MM3 (ref 3.4–10.8)
WBC UR QL AUTO: ABNORMAL /HPF

## 2019-03-28 PROCEDURE — 0QS636Z REPOSITION RIGHT UPPER FEMUR WITH INTRAMEDULLARY INTERNAL FIXATION DEVICE, PERCUTANEOUS APPROACH: ICD-10-PCS | Performed by: ORTHOPAEDIC SURGERY

## 2019-03-28 PROCEDURE — 84481 FREE ASSAY (FT-3): CPT | Performed by: PHYSICIAN ASSISTANT

## 2019-03-28 PROCEDURE — 25010000002 DEXAMETHASONE PER 1 MG: Performed by: NURSE ANESTHETIST, CERTIFIED REGISTERED

## 2019-03-28 PROCEDURE — 86850 RBC ANTIBODY SCREEN: CPT | Performed by: INTERNAL MEDICINE

## 2019-03-28 PROCEDURE — C1713 ANCHOR/SCREW BN/BN,TIS/BN: HCPCS | Performed by: ORTHOPAEDIC SURGERY

## 2019-03-28 PROCEDURE — 86900 BLOOD TYPING SEROLOGIC ABO: CPT

## 2019-03-28 PROCEDURE — 25010000002 ONDANSETRON PER 1 MG: Performed by: NURSE ANESTHETIST, CERTIFIED REGISTERED

## 2019-03-28 PROCEDURE — 71045 X-RAY EXAM CHEST 1 VIEW: CPT | Performed by: RADIOLOGY

## 2019-03-28 PROCEDURE — 73501 X-RAY EXAM HIP UNI 1 VIEW: CPT | Performed by: RADIOLOGY

## 2019-03-28 PROCEDURE — 73501 X-RAY EXAM HIP UNI 1 VIEW: CPT

## 2019-03-28 PROCEDURE — 84439 ASSAY OF FREE THYROXINE: CPT | Performed by: PHYSICIAN ASSISTANT

## 2019-03-28 PROCEDURE — 80156 ASSAY CARBAMAZEPINE TOTAL: CPT | Performed by: PHYSICIAN ASSISTANT

## 2019-03-28 PROCEDURE — 73502 X-RAY EXAM HIP UNI 2-3 VIEWS: CPT | Performed by: RADIOLOGY

## 2019-03-28 PROCEDURE — 93010 ELECTROCARDIOGRAM REPORT: CPT | Performed by: INTERNAL MEDICINE

## 2019-03-28 PROCEDURE — 81001 URINALYSIS AUTO W/SCOPE: CPT | Performed by: PHYSICIAN ASSISTANT

## 2019-03-28 PROCEDURE — 80053 COMPREHEN METABOLIC PANEL: CPT | Performed by: PHYSICIAN ASSISTANT

## 2019-03-28 PROCEDURE — 27245 TREAT THIGH FRACTURE: CPT | Performed by: ORTHOPAEDIC SURGERY

## 2019-03-28 PROCEDURE — 86901 BLOOD TYPING SEROLOGIC RH(D): CPT | Performed by: INTERNAL MEDICINE

## 2019-03-28 PROCEDURE — 99221 1ST HOSP IP/OBS SF/LOW 40: CPT | Performed by: PHYSICIAN ASSISTANT

## 2019-03-28 PROCEDURE — 86923 COMPATIBILITY TEST ELECTRIC: CPT

## 2019-03-28 PROCEDURE — 25010000002 PROPOFOL 10 MG/ML EMULSION: Performed by: NURSE ANESTHETIST, CERTIFIED REGISTERED

## 2019-03-28 PROCEDURE — 25010000003 CEFAZOLIN SODIUM-DEXTROSE 2-3 GM-%(50ML) RECONSTITUTED SOLUTION: Performed by: ORTHOPAEDIC SURGERY

## 2019-03-28 PROCEDURE — 25010000002 MORPHINE PER 10 MG: Performed by: INTERNAL MEDICINE

## 2019-03-28 PROCEDURE — 25010000002 CEFTRIAXONE: Performed by: PHYSICIAN ASSISTANT

## 2019-03-28 PROCEDURE — 86901 BLOOD TYPING SEROLOGIC RH(D): CPT

## 2019-03-28 PROCEDURE — 25010000002 MORPHINE PER 10 MG: Performed by: EMERGENCY MEDICINE

## 2019-03-28 PROCEDURE — C1769 GUIDE WIRE: HCPCS | Performed by: ORTHOPAEDIC SURGERY

## 2019-03-28 PROCEDURE — 76000 FLUOROSCOPY <1 HR PHYS/QHP: CPT

## 2019-03-28 PROCEDURE — 85025 COMPLETE CBC W/AUTO DIFF WBC: CPT | Performed by: PHYSICIAN ASSISTANT

## 2019-03-28 PROCEDURE — 71045 X-RAY EXAM CHEST 1 VIEW: CPT

## 2019-03-28 PROCEDURE — 99223 1ST HOSP IP/OBS HIGH 75: CPT | Performed by: INTERNAL MEDICINE

## 2019-03-28 PROCEDURE — 93005 ELECTROCARDIOGRAM TRACING: CPT | Performed by: PHYSICIAN ASSISTANT

## 2019-03-28 PROCEDURE — 76000 FLUOROSCOPY <1 HR PHYS/QHP: CPT | Performed by: RADIOLOGY

## 2019-03-28 PROCEDURE — 99285 EMERGENCY DEPT VISIT HI MDM: CPT

## 2019-03-28 PROCEDURE — 25010000003 CEFAZOLIN PER 500 MG: Performed by: ORTHOPAEDIC SURGERY

## 2019-03-28 PROCEDURE — 86900 BLOOD TYPING SEROLOGIC ABO: CPT | Performed by: INTERNAL MEDICINE

## 2019-03-28 PROCEDURE — 25010000002 FENTANYL CITRATE (PF) 100 MCG/2ML SOLUTION: Performed by: NURSE ANESTHETIST, CERTIFIED REGISTERED

## 2019-03-28 PROCEDURE — 84443 ASSAY THYROID STIM HORMONE: CPT | Performed by: PHYSICIAN ASSISTANT

## 2019-03-28 PROCEDURE — 94799 UNLISTED PULMONARY SVC/PX: CPT

## 2019-03-28 PROCEDURE — 73502 X-RAY EXAM HIP UNI 2-3 VIEWS: CPT

## 2019-03-28 PROCEDURE — 87040 BLOOD CULTURE FOR BACTERIA: CPT | Performed by: PHYSICIAN ASSISTANT

## 2019-03-28 PROCEDURE — 51702 INSERT TEMP BLADDER CATH: CPT

## 2019-03-28 DEVICE — LOCKING SCREW, FULLY THREADED: Type: IMPLANTABLE DEVICE | Site: HIP | Status: FUNCTIONAL

## 2019-03-28 DEVICE — TROCHANTERIC NAIL KIT, TI
Type: IMPLANTABLE DEVICE | Site: HIP | Status: FUNCTIONAL
Brand: GAMMA

## 2019-03-28 DEVICE — LAG SCREW, TI
Type: IMPLANTABLE DEVICE | Site: HIP | Status: FUNCTIONAL
Brand: GAMMA

## 2019-03-28 RX ORDER — FERROUS SULFATE 325(65) MG
325 TABLET ORAL
Status: CANCELLED | OUTPATIENT
Start: 2019-03-28

## 2019-03-28 RX ORDER — NICOTINE 21 MG/24HR
1 PATCH, TRANSDERMAL 24 HOURS TRANSDERMAL EVERY 24 HOURS
Status: CANCELLED | OUTPATIENT
Start: 2019-03-28

## 2019-03-28 RX ORDER — MIRTAZAPINE 15 MG/1
15 TABLET, FILM COATED ORAL NIGHTLY
Status: DISCONTINUED | OUTPATIENT
Start: 2019-03-28 | End: 2019-04-02 | Stop reason: HOSPADM

## 2019-03-28 RX ORDER — DEXAMETHASONE SODIUM PHOSPHATE 4 MG/ML
INJECTION, SOLUTION INTRA-ARTICULAR; INTRALESIONAL; INTRAMUSCULAR; INTRAVENOUS; SOFT TISSUE AS NEEDED
Status: DISCONTINUED | OUTPATIENT
Start: 2019-03-28 | End: 2019-03-28 | Stop reason: SURG

## 2019-03-28 RX ORDER — LEVOTHYROXINE SODIUM 0.03 MG/1
25 TABLET ORAL
Status: DISCONTINUED | OUTPATIENT
Start: 2019-03-28 | End: 2019-04-02 | Stop reason: HOSPADM

## 2019-03-28 RX ORDER — NICOTINE 21 MG/24HR
1 PATCH, TRANSDERMAL 24 HOURS TRANSDERMAL EVERY 24 HOURS
Status: CANCELLED | OUTPATIENT
Start: 2019-03-28 | End: 2019-03-29

## 2019-03-28 RX ORDER — LEVOTHYROXINE SODIUM 0.03 MG/1
25 TABLET ORAL DAILY
Status: CANCELLED | OUTPATIENT
Start: 2019-03-28

## 2019-03-28 RX ORDER — SODIUM CHLORIDE 0.9 % (FLUSH) 0.9 %
3 SYRINGE (ML) INJECTION EVERY 12 HOURS SCHEDULED
Status: DISCONTINUED | OUTPATIENT
Start: 2019-03-28 | End: 2019-03-28 | Stop reason: HOSPADM

## 2019-03-28 RX ORDER — ASPIRIN 81 MG/1
81 TABLET, CHEWABLE ORAL DAILY
Status: CANCELLED | OUTPATIENT
Start: 2019-03-28

## 2019-03-28 RX ORDER — PROPOFOL 10 MG/ML
VIAL (ML) INTRAVENOUS AS NEEDED
Status: DISCONTINUED | OUTPATIENT
Start: 2019-03-28 | End: 2019-03-28 | Stop reason: SURG

## 2019-03-28 RX ORDER — ERGOCALCIFEROL 1.25 MG/1
50000 CAPSULE ORAL WEEKLY
COMMUNITY

## 2019-03-28 RX ORDER — ONDANSETRON 4 MG/1
4 TABLET, ORALLY DISINTEGRATING ORAL EVERY 6 HOURS PRN
Status: DISCONTINUED | OUTPATIENT
Start: 2019-03-28 | End: 2019-04-02 | Stop reason: HOSPADM

## 2019-03-28 RX ORDER — BUPIVACAINE HYDROCHLORIDE AND EPINEPHRINE 5; 5 MG/ML; UG/ML
INJECTION, SOLUTION EPIDURAL; INTRACAUDAL; PERINEURAL AS NEEDED
Status: DISCONTINUED | OUTPATIENT
Start: 2019-03-28 | End: 2019-03-28 | Stop reason: HOSPADM

## 2019-03-28 RX ORDER — SODIUM CHLORIDE 0.9 % (FLUSH) 0.9 %
3-10 SYRINGE (ML) INJECTION AS NEEDED
Status: DISCONTINUED | OUTPATIENT
Start: 2019-03-28 | End: 2019-03-28 | Stop reason: HOSPADM

## 2019-03-28 RX ORDER — CARBAMAZEPINE 200 MG/1
200 TABLET ORAL 2 TIMES DAILY
Status: CANCELLED | OUTPATIENT
Start: 2019-03-28

## 2019-03-28 RX ORDER — MIRTAZAPINE 15 MG/1
15 TABLET, FILM COATED ORAL NIGHTLY
COMMUNITY
End: 2019-06-27

## 2019-03-28 RX ORDER — SODIUM CHLORIDE 0.9 % (FLUSH) 0.9 %
3 SYRINGE (ML) INJECTION EVERY 12 HOURS SCHEDULED
Status: DISCONTINUED | OUTPATIENT
Start: 2019-03-28 | End: 2019-04-02 | Stop reason: HOSPADM

## 2019-03-28 RX ORDER — OXYCODONE HYDROCHLORIDE AND ACETAMINOPHEN 5; 325 MG/1; MG/1
1 TABLET ORAL ONCE AS NEEDED
Status: DISCONTINUED | OUTPATIENT
Start: 2019-03-28 | End: 2019-03-28 | Stop reason: HOSPADM

## 2019-03-28 RX ORDER — ONDANSETRON 2 MG/ML
4 INJECTION INTRAMUSCULAR; INTRAVENOUS ONCE AS NEEDED
Status: DISCONTINUED | OUTPATIENT
Start: 2019-03-28 | End: 2019-03-28 | Stop reason: HOSPADM

## 2019-03-28 RX ORDER — ONDANSETRON 2 MG/ML
INJECTION INTRAMUSCULAR; INTRAVENOUS AS NEEDED
Status: DISCONTINUED | OUTPATIENT
Start: 2019-03-28 | End: 2019-03-28 | Stop reason: SURG

## 2019-03-28 RX ORDER — CEFAZOLIN SODIUM 2 G/50ML
2 SOLUTION INTRAVENOUS ONCE
Status: COMPLETED | OUTPATIENT
Start: 2019-03-28 | End: 2019-03-28

## 2019-03-28 RX ORDER — ACETAMINOPHEN 325 MG/1
650 TABLET ORAL EVERY 4 HOURS PRN
Status: DISCONTINUED | OUTPATIENT
Start: 2019-03-28 | End: 2019-04-02 | Stop reason: HOSPADM

## 2019-03-28 RX ORDER — IPRATROPIUM BROMIDE AND ALBUTEROL SULFATE 2.5; .5 MG/3ML; MG/3ML
3 SOLUTION RESPIRATORY (INHALATION) EVERY 6 HOURS PRN
Status: DISCONTINUED | OUTPATIENT
Start: 2019-03-28 | End: 2019-04-02

## 2019-03-28 RX ORDER — LEVOTHYROXINE SODIUM 0.03 MG/1
25 TABLET ORAL DAILY
COMMUNITY

## 2019-03-28 RX ORDER — CARBOXYMETHYLCELLULOSE SODIUM 5 MG/ML
1 SOLUTION/ DROPS OPHTHALMIC 2 TIMES DAILY
Status: DISCONTINUED | OUTPATIENT
Start: 2019-03-28 | End: 2019-04-02 | Stop reason: HOSPADM

## 2019-03-28 RX ORDER — IPRATROPIUM BROMIDE AND ALBUTEROL SULFATE 2.5; .5 MG/3ML; MG/3ML
3 SOLUTION RESPIRATORY (INHALATION) ONCE AS NEEDED
Status: DISCONTINUED | OUTPATIENT
Start: 2019-03-28 | End: 2019-03-28 | Stop reason: HOSPADM

## 2019-03-28 RX ORDER — ONDANSETRON 4 MG/1
4 TABLET, FILM COATED ORAL EVERY 6 HOURS PRN
Status: DISCONTINUED | OUTPATIENT
Start: 2019-03-28 | End: 2019-04-02 | Stop reason: HOSPADM

## 2019-03-28 RX ORDER — AMLODIPINE BESYLATE 5 MG/1
5 TABLET ORAL DAILY
Status: DISCONTINUED | OUTPATIENT
Start: 2019-03-28 | End: 2019-03-28

## 2019-03-28 RX ORDER — FERROUS SULFATE 325(65) MG
325 TABLET ORAL 2 TIMES DAILY
COMMUNITY

## 2019-03-28 RX ORDER — SODIUM CHLORIDE 0.9 % (FLUSH) 0.9 %
3-10 SYRINGE (ML) INJECTION AS NEEDED
Status: DISCONTINUED | OUTPATIENT
Start: 2019-03-28 | End: 2019-04-02 | Stop reason: HOSPADM

## 2019-03-28 RX ORDER — FERROUS SULFATE 325(65) MG
325 TABLET ORAL
Status: DISCONTINUED | OUTPATIENT
Start: 2019-03-28 | End: 2019-04-02 | Stop reason: HOSPADM

## 2019-03-28 RX ORDER — BISACODYL 10 MG
10 SUPPOSITORY, RECTAL RECTAL DAILY PRN
Status: DISCONTINUED | OUTPATIENT
Start: 2019-03-28 | End: 2019-04-02 | Stop reason: HOSPADM

## 2019-03-28 RX ORDER — SODIUM CHLORIDE 0.9 % (FLUSH) 0.9 %
10 SYRINGE (ML) INJECTION AS NEEDED
Status: DISCONTINUED | OUTPATIENT
Start: 2019-03-28 | End: 2019-04-02 | Stop reason: HOSPADM

## 2019-03-28 RX ORDER — FENTANYL CITRATE 50 UG/ML
INJECTION, SOLUTION INTRAMUSCULAR; INTRAVENOUS AS NEEDED
Status: DISCONTINUED | OUTPATIENT
Start: 2019-03-28 | End: 2019-03-28 | Stop reason: SURG

## 2019-03-28 RX ORDER — CYANOCOBALAMIN 1000 UG/ML
1000 INJECTION, SOLUTION INTRAMUSCULAR; SUBCUTANEOUS WEEKLY
Status: CANCELLED | OUTPATIENT
Start: 2019-03-28

## 2019-03-28 RX ORDER — FAMOTIDINE 20 MG/1
20 TABLET, FILM COATED ORAL DAILY
Status: DISCONTINUED | OUTPATIENT
Start: 2019-03-28 | End: 2019-04-02 | Stop reason: HOSPADM

## 2019-03-28 RX ORDER — FENTANYL CITRATE 50 UG/ML
50 INJECTION, SOLUTION INTRAMUSCULAR; INTRAVENOUS
Status: DISCONTINUED | OUTPATIENT
Start: 2019-03-28 | End: 2019-03-28 | Stop reason: HOSPADM

## 2019-03-28 RX ORDER — MIRTAZAPINE 15 MG/1
15 TABLET, FILM COATED ORAL NIGHTLY
Status: CANCELLED | OUTPATIENT
Start: 2019-03-28

## 2019-03-28 RX ORDER — CYANOCOBALAMIN 1000 UG/ML
1000 INJECTION, SOLUTION INTRAMUSCULAR; SUBCUTANEOUS WEEKLY
Status: DISCONTINUED | OUTPATIENT
Start: 2019-04-01 | End: 2019-04-02 | Stop reason: HOSPADM

## 2019-03-28 RX ORDER — HYDROCODONE BITARTRATE AND ACETAMINOPHEN 5; 325 MG/1; MG/1
1 TABLET ORAL EVERY 4 HOURS PRN
Status: DISCONTINUED | OUTPATIENT
Start: 2019-03-28 | End: 2019-04-02 | Stop reason: HOSPADM

## 2019-03-28 RX ORDER — DABIGATRAN ETEXILATE 150 MG/1
150 CAPSULE ORAL EVERY 12 HOURS SCHEDULED
Status: CANCELLED | OUTPATIENT
Start: 2019-03-28

## 2019-03-28 RX ORDER — CARBAMAZEPINE 200 MG/1
200 TABLET ORAL 2 TIMES DAILY
Status: DISCONTINUED | OUTPATIENT
Start: 2019-03-28 | End: 2019-04-02 | Stop reason: HOSPADM

## 2019-03-28 RX ORDER — SODIUM CHLORIDE, SODIUM LACTATE, POTASSIUM CHLORIDE, CALCIUM CHLORIDE 600; 310; 30; 20 MG/100ML; MG/100ML; MG/100ML; MG/100ML
125 INJECTION, SOLUTION INTRAVENOUS CONTINUOUS
Status: DISCONTINUED | OUTPATIENT
Start: 2019-03-28 | End: 2019-03-28

## 2019-03-28 RX ORDER — IBUPROFEN 400 MG/1
400 TABLET ORAL EVERY 6 HOURS PRN
Status: CANCELLED | OUTPATIENT
Start: 2019-03-28

## 2019-03-28 RX ORDER — ACETAMINOPHEN 650 MG/1
650 SUPPOSITORY RECTAL EVERY 4 HOURS PRN
Status: DISCONTINUED | OUTPATIENT
Start: 2019-03-28 | End: 2019-04-02 | Stop reason: HOSPADM

## 2019-03-28 RX ORDER — LIDOCAINE HYDROCHLORIDE 20 MG/ML
INJECTION, SOLUTION EPIDURAL; INFILTRATION; INTRACAUDAL; PERINEURAL AS NEEDED
Status: DISCONTINUED | OUTPATIENT
Start: 2019-03-28 | End: 2019-03-28 | Stop reason: SURG

## 2019-03-28 RX ORDER — CYANOCOBALAMIN 1000 UG/ML
1000 INJECTION, SOLUTION INTRAMUSCULAR; SUBCUTANEOUS WEEKLY
COMMUNITY

## 2019-03-28 RX ORDER — NITROGLYCERIN 0.4 MG/1
0.4 TABLET SUBLINGUAL
Status: DISCONTINUED | OUTPATIENT
Start: 2019-03-28 | End: 2019-04-02 | Stop reason: HOSPADM

## 2019-03-28 RX ORDER — ONDANSETRON 2 MG/ML
4 INJECTION INTRAMUSCULAR; INTRAVENOUS EVERY 6 HOURS PRN
Status: DISCONTINUED | OUTPATIENT
Start: 2019-03-28 | End: 2019-04-02 | Stop reason: HOSPADM

## 2019-03-28 RX ORDER — ACETAMINOPHEN 160 MG/5ML
650 SOLUTION ORAL EVERY 4 HOURS PRN
Status: DISCONTINUED | OUTPATIENT
Start: 2019-03-28 | End: 2019-04-02 | Stop reason: HOSPADM

## 2019-03-28 RX ORDER — AMLODIPINE BESYLATE 5 MG/1
5 TABLET ORAL DAILY
Status: CANCELLED | OUTPATIENT
Start: 2019-03-28

## 2019-03-28 RX ADMIN — MORPHINE SULFATE 1 MG: 4 INJECTION INTRAVENOUS at 08:24

## 2019-03-28 RX ADMIN — CEFAZOLIN 2 G: 1 INJECTION, POWDER, FOR SOLUTION INTRAMUSCULAR; INTRAVENOUS; PARENTERAL at 21:57

## 2019-03-28 RX ADMIN — FENTANYL CITRATE 25 MCG: 50 INJECTION INTRAMUSCULAR; INTRAVENOUS at 15:38

## 2019-03-28 RX ADMIN — CARBAMAZEPINE 200 MG: 200 TABLET ORAL at 20:48

## 2019-03-28 RX ADMIN — MIRTAZAPINE 15 MG: 15 TABLET, FILM COATED ORAL at 20:48

## 2019-03-28 RX ADMIN — CARBOXYMETHYLCELLULOSE SODIUM 1 DROP: 5 SOLUTION/ DROPS OPHTHALMIC at 20:48

## 2019-03-28 RX ADMIN — FENTANYL CITRATE 25 MCG: 50 INJECTION INTRAMUSCULAR; INTRAVENOUS at 15:33

## 2019-03-28 RX ADMIN — SODIUM CHLORIDE, POTASSIUM CHLORIDE, SODIUM LACTATE AND CALCIUM CHLORIDE: 600; 310; 30; 20 INJECTION, SOLUTION INTRAVENOUS at 14:03

## 2019-03-28 RX ADMIN — MORPHINE SULFATE 2 MG: 4 INJECTION, SOLUTION INTRAMUSCULAR; INTRAVENOUS at 04:37

## 2019-03-28 RX ADMIN — CEFAZOLIN SODIUM 2 G: 2 SOLUTION INTRAVENOUS at 14:30

## 2019-03-28 RX ADMIN — PROPOFOL 60 MG: 10 INJECTION, EMULSION INTRAVENOUS at 14:35

## 2019-03-28 RX ADMIN — ONDANSETRON 4 MG: 2 INJECTION, SOLUTION INTRAMUSCULAR; INTRAVENOUS at 15:03

## 2019-03-28 RX ADMIN — CEFTRIAXONE 1 G: 1 INJECTION, POWDER, FOR SOLUTION INTRAMUSCULAR; INTRAVENOUS at 06:14

## 2019-03-28 RX ADMIN — FENTANYL CITRATE 25 MCG: 50 INJECTION INTRAMUSCULAR; INTRAVENOUS at 14:57

## 2019-03-28 RX ADMIN — LIDOCAINE HYDROCHLORIDE 3 ML: 20 INJECTION, SOLUTION EPIDURAL; INFILTRATION; INTRACAUDAL; PERINEURAL at 14:35

## 2019-03-28 RX ADMIN — DEXAMETHASONE SODIUM PHOSPHATE 4 MG: 4 INJECTION, SOLUTION INTRAMUSCULAR; INTRAVENOUS at 15:03

## 2019-03-28 RX ADMIN — FENTANYL CITRATE 25 MCG: 50 INJECTION INTRAMUSCULAR; INTRAVENOUS at 15:03

## 2019-03-28 NOTE — ANESTHESIA PROCEDURE NOTES
Airway  Urgency: elective    Date/Time: 3/28/2019 2:37 PM  End Time:3/28/2019 2:37 PM    General Information and Staff    Patient location during procedure: OR  CRNA: Champ Sylvester CRNA    Indications and Patient Condition  Indications for airway management: airway protection    Preoxygenated: yes  MILS maintained throughout  Mask difficulty assessment: 0 - not attempted    Final Airway Details  Final airway type: supraglottic airway      Successful airway: unique  Size 3  Airway Seal Pressure (cm H2O): 20    Number of attempts at approach: 1    Additional Comments  Atraumatic

## 2019-03-28 NOTE — ANESTHESIA PREPROCEDURE EVALUATION
Anesthesia Evaluation     Patient summary reviewed and Nursing notes reviewed   no history of anesthetic complications:  NPO Solid Status: > 8 hours  NPO Liquid Status: > 8 hours           Airway   Mallampati: I  TM distance: >3 FB  Neck ROM: full  No difficulty expected  Dental - normal exam   (+) edentulous    Pulmonary - negative pulmonary ROS and normal exam   Cardiovascular - normal exam    (+) hypertension, hyperlipidemia,       Neuro/Psych  (+) dementia,     GI/Hepatic/Renal/Endo - negative ROS     Musculoskeletal (-) negative ROS    Abdominal  - normal exam    Bowel sounds: normal.   Substance History - negative use     OB/GYN negative ob/gyn ROS         Other                        Anesthesia Plan    ASA 3     general     intravenous induction   Anesthetic plan, all risks, benefits, and alternatives have been provided, discussed and informed consent has been obtained with: patient.

## 2019-03-28 NOTE — ANESTHESIA POSTPROCEDURE EVALUATION
Patient: Mary Lou Graves    Procedure Summary     Date:  03/28/19 Room / Location:  [facility] OR 03 /  [facility] OR    Anesthesia Start:  1430 Anesthesia Stop:  1541    Procedure:  HIP INTERTROCHANTERIC NAILING (Right Hip) Diagnosis:       Closed displaced intertrochanteric fracture of right femur, initial encounter (CMS/[facility])      (Closed displaced intertrochanteric fracture of right femur, initial encounter (CMS/[facility]) [S72.141A])    Surgeon:  Sebas Burris MD Provider:  Darren Rasmussen MD    Anesthesia Type:  general ASA Status:  3          Anesthesia Type: general  Last vitals  BP   145/61 (03/28/19 1603)   Temp   98.6 °F (37 °C) (03/28/19 1543)   Pulse   69 (03/28/19 1603)   Resp   17 (03/28/19 1603)     SpO2   100 % (03/28/19 1603)     Post Anesthesia Care and Evaluation    Patient location during evaluation: PHASE II  Patient participation: complete - patient participated  Level of consciousness: awake and alert  Pain score: 1  Pain management: adequate  Airway patency: patent  Anesthetic complications: No anesthetic complications  PONV Status: controlled  Cardiovascular status: acceptable  Respiratory status: acceptable  Hydration status: acceptable

## 2019-03-29 LAB
ALBUMIN SERPL-MCNC: 2.9 G/DL (ref 3.5–5.2)
ALBUMIN/GLOB SERPL: 1.2 G/DL
ALP SERPL-CCNC: 104 U/L (ref 39–117)
ALT SERPL W P-5'-P-CCNC: 14 U/L (ref 1–33)
ANION GAP SERPL CALCULATED.3IONS-SCNC: 10.9 MMOL/L
AST SERPL-CCNC: 17 U/L (ref 1–32)
BACTERIA UR QL AUTO: ABNORMAL /HPF
BASOPHILS # BLD AUTO: 0.01 10*3/MM3 (ref 0–0.2)
BASOPHILS # BLD AUTO: 0.01 10*3/MM3 (ref 0–0.2)
BASOPHILS NFR BLD AUTO: 0.1 % (ref 0–1.5)
BASOPHILS NFR BLD AUTO: 0.1 % (ref 0–1.5)
BILIRUB SERPL-MCNC: 0.2 MG/DL (ref 0.2–1.2)
BILIRUB UR QL STRIP: NEGATIVE
BUN BLD-MCNC: 24 MG/DL (ref 8–23)
BUN/CREAT SERPL: 22.6 (ref 7–25)
CALCIUM SPEC-SCNC: 8 MG/DL (ref 8.6–10.5)
CHLORIDE SERPL-SCNC: 102 MMOL/L (ref 98–107)
CLARITY UR: ABNORMAL
CO2 SERPL-SCNC: 26.1 MMOL/L (ref 22–29)
COLOR UR: ABNORMAL
CREAT BLD-MCNC: 1.06 MG/DL (ref 0.57–1)
DEPRECATED RDW RBC AUTO: 50.7 FL (ref 37–54)
DEPRECATED RDW RBC AUTO: 51.5 FL (ref 37–54)
EOSINOPHIL # BLD AUTO: 0 10*3/MM3 (ref 0–0.4)
EOSINOPHIL # BLD AUTO: 0 10*3/MM3 (ref 0–0.4)
EOSINOPHIL NFR BLD AUTO: 0 % (ref 0.3–6.2)
EOSINOPHIL NFR BLD AUTO: 0 % (ref 0.3–6.2)
ERYTHROCYTE [DISTWIDTH] IN BLOOD BY AUTOMATED COUNT: 15.7 % (ref 12.3–15.4)
ERYTHROCYTE [DISTWIDTH] IN BLOOD BY AUTOMATED COUNT: 15.9 % (ref 12.3–15.4)
GFR SERPL CREATININE-BSD FRML MDRD: 50 ML/MIN/1.73
GLOBULIN UR ELPH-MCNC: 2.4 GM/DL
GLUCOSE BLD-MCNC: 122 MG/DL (ref 65–99)
GLUCOSE UR STRIP-MCNC: NEGATIVE MG/DL
HCT VFR BLD AUTO: 20.8 % (ref 34–46.6)
HCT VFR BLD AUTO: 24.2 % (ref 34–46.6)
HCT VFR BLD AUTO: 25.6 % (ref 34–46.6)
HGB BLD-MCNC: 6.3 G/DL (ref 12–15.9)
HGB BLD-MCNC: 7.4 G/DL (ref 12–15.9)
HGB BLD-MCNC: 8 G/DL (ref 12–15.9)
HGB UR QL STRIP.AUTO: ABNORMAL
HYALINE CASTS UR QL AUTO: ABNORMAL /LPF
IMM GRANULOCYTES # BLD AUTO: 0.01 10*3/MM3 (ref 0–0.05)
IMM GRANULOCYTES # BLD AUTO: 0.02 10*3/MM3 (ref 0–0.05)
IMM GRANULOCYTES NFR BLD AUTO: 0.1 % (ref 0–0.5)
IMM GRANULOCYTES NFR BLD AUTO: 0.2 % (ref 0–0.5)
INR PPP: 1.24 (ref 0.9–1.1)
KETONES UR QL STRIP: ABNORMAL
LEUKOCYTE ESTERASE UR QL STRIP.AUTO: ABNORMAL
LYMPHOCYTES # BLD AUTO: 0.78 10*3/MM3 (ref 0.7–3.1)
LYMPHOCYTES # BLD AUTO: 0.89 10*3/MM3 (ref 0.7–3.1)
LYMPHOCYTES NFR BLD AUTO: 12.4 % (ref 19.6–45.3)
LYMPHOCYTES NFR BLD AUTO: 8.6 % (ref 19.6–45.3)
MAGNESIUM SERPL-MCNC: 1.9 MG/DL (ref 1.6–2.4)
MCH RBC QN AUTO: 27.7 PG (ref 26.6–33)
MCH RBC QN AUTO: 28.1 PG (ref 26.6–33)
MCHC RBC AUTO-ENTMCNC: 30.3 G/DL (ref 31.5–35.7)
MCHC RBC AUTO-ENTMCNC: 30.6 G/DL (ref 31.5–35.7)
MCV RBC AUTO: 90.6 FL (ref 79–97)
MCV RBC AUTO: 92.9 FL (ref 79–97)
MONOCYTES # BLD AUTO: 0.97 10*3/MM3 (ref 0.1–0.9)
MONOCYTES # BLD AUTO: 1 10*3/MM3 (ref 0.1–0.9)
MONOCYTES NFR BLD AUTO: 11 % (ref 5–12)
MONOCYTES NFR BLD AUTO: 13.5 % (ref 5–12)
NEUTROPHILS # BLD AUTO: 5.28 10*3/MM3 (ref 1.4–7)
NEUTROPHILS # BLD AUTO: 7.29 10*3/MM3 (ref 1.4–7)
NEUTROPHILS NFR BLD AUTO: 73.9 % (ref 42.7–76)
NEUTROPHILS NFR BLD AUTO: 80.1 % (ref 42.7–76)
NITRITE UR QL STRIP: POSITIVE
PH UR STRIP.AUTO: <=5 [PH] (ref 5–8)
PHOSPHATE SERPL-MCNC: 4.4 MG/DL (ref 2.5–4.5)
PLATELET # BLD AUTO: 232 10*3/MM3 (ref 140–450)
PLATELET # BLD AUTO: 260 10*3/MM3 (ref 140–450)
PMV BLD AUTO: 8.7 FL (ref 6–12)
PMV BLD AUTO: 9.6 FL (ref 6–12)
POTASSIUM BLD-SCNC: 5.2 MMOL/L (ref 3.5–5.2)
PROT SERPL-MCNC: 5.3 G/DL (ref 6–8.5)
PROT UR QL STRIP: ABNORMAL
PROTHROMBIN TIME: 15.8 SECONDS (ref 11–15.4)
RBC # BLD AUTO: 2.24 10*6/MM3 (ref 3.77–5.28)
RBC # BLD AUTO: 2.67 10*6/MM3 (ref 3.77–5.28)
RBC # UR: ABNORMAL /HPF
REF LAB TEST METHOD: ABNORMAL
SODIUM BLD-SCNC: 139 MMOL/L (ref 136–145)
SP GR UR STRIP: 1.02 (ref 1–1.03)
SQUAMOUS #/AREA URNS HPF: ABNORMAL /HPF
UROBILINOGEN UR QL STRIP: ABNORMAL
WBC NRBC COR # BLD: 7.16 10*3/MM3 (ref 3.4–10.8)
WBC NRBC COR # BLD: 9.1 10*3/MM3 (ref 3.4–10.8)
WBC UR QL AUTO: ABNORMAL /HPF

## 2019-03-29 PROCEDURE — 25010000002 ENOXAPARIN PER 10 MG: Performed by: ORTHOPAEDIC SURGERY

## 2019-03-29 PROCEDURE — 25010000003 CEFAZOLIN PER 500 MG: Performed by: ORTHOPAEDIC SURGERY

## 2019-03-29 PROCEDURE — 84100 ASSAY OF PHOSPHORUS: CPT | Performed by: PHYSICIAN ASSISTANT

## 2019-03-29 PROCEDURE — 85610 PROTHROMBIN TIME: CPT | Performed by: INTERNAL MEDICINE

## 2019-03-29 PROCEDURE — 83735 ASSAY OF MAGNESIUM: CPT | Performed by: PHYSICIAN ASSISTANT

## 2019-03-29 PROCEDURE — 97530 THERAPEUTIC ACTIVITIES: CPT

## 2019-03-29 PROCEDURE — 85014 HEMATOCRIT: CPT | Performed by: INTERNAL MEDICINE

## 2019-03-29 PROCEDURE — 36430 TRANSFUSION BLD/BLD COMPNT: CPT

## 2019-03-29 PROCEDURE — 25010000002 CEFTRIAXONE: Performed by: HOSPITALIST

## 2019-03-29 PROCEDURE — 85018 HEMOGLOBIN: CPT | Performed by: INTERNAL MEDICINE

## 2019-03-29 PROCEDURE — 99232 SBSQ HOSP IP/OBS MODERATE 35: CPT | Performed by: HOSPITALIST

## 2019-03-29 PROCEDURE — 97162 PT EVAL MOD COMPLEX 30 MIN: CPT

## 2019-03-29 PROCEDURE — 92610 EVALUATE SWALLOWING FUNCTION: CPT

## 2019-03-29 PROCEDURE — 86900 BLOOD TYPING SEROLOGIC ABO: CPT

## 2019-03-29 PROCEDURE — 81001 URINALYSIS AUTO W/SCOPE: CPT | Performed by: PHYSICIAN ASSISTANT

## 2019-03-29 PROCEDURE — 63710000001 DIPHENHYDRAMINE PER 50 MG: Performed by: INTERNAL MEDICINE

## 2019-03-29 PROCEDURE — 80053 COMPREHEN METABOLIC PANEL: CPT | Performed by: INTERNAL MEDICINE

## 2019-03-29 PROCEDURE — 94799 UNLISTED PULMONARY SVC/PX: CPT

## 2019-03-29 PROCEDURE — 99024 POSTOP FOLLOW-UP VISIT: CPT | Performed by: ORTHOPAEDIC SURGERY

## 2019-03-29 PROCEDURE — P9016 RBC LEUKOCYTES REDUCED: HCPCS

## 2019-03-29 PROCEDURE — 85025 COMPLETE CBC W/AUTO DIFF WBC: CPT | Performed by: HOSPITALIST

## 2019-03-29 PROCEDURE — 25010000002 MORPHINE PER 10 MG: Performed by: INTERNAL MEDICINE

## 2019-03-29 PROCEDURE — 85025 COMPLETE CBC W/AUTO DIFF WBC: CPT | Performed by: INTERNAL MEDICINE

## 2019-03-29 RX ORDER — ACETAMINOPHEN 325 MG/1
650 TABLET ORAL ONCE
Status: COMPLETED | OUTPATIENT
Start: 2019-03-29 | End: 2019-03-29

## 2019-03-29 RX ORDER — DIPHENHYDRAMINE HCL 25 MG
25 CAPSULE ORAL ONCE
Status: COMPLETED | OUTPATIENT
Start: 2019-03-29 | End: 2019-03-29

## 2019-03-29 RX ORDER — DEXTROSE AND SODIUM CHLORIDE 5; .45 G/100ML; G/100ML
50 INJECTION, SOLUTION INTRAVENOUS CONTINUOUS
Status: DISCONTINUED | OUTPATIENT
Start: 2019-03-29 | End: 2019-03-30

## 2019-03-29 RX ORDER — L.ACID,PARA/B.BIFIDUM/S.THERM 8B CELL
1 CAPSULE ORAL DAILY
Status: DISCONTINUED | OUTPATIENT
Start: 2019-03-29 | End: 2019-04-02 | Stop reason: HOSPADM

## 2019-03-29 RX ORDER — SODIUM CHLORIDE 9 MG/ML
INJECTION, SOLUTION INTRAVENOUS
Status: DISPENSED
Start: 2019-03-29 | End: 2019-03-29

## 2019-03-29 RX ADMIN — Medication 1 CAPSULE: at 16:30

## 2019-03-29 RX ADMIN — CARBAMAZEPINE 200 MG: 200 TABLET ORAL at 08:19

## 2019-03-29 RX ADMIN — MORPHINE SULFATE 1 MG: 4 INJECTION INTRAVENOUS at 21:31

## 2019-03-29 RX ADMIN — DEXTROSE AND SODIUM CHLORIDE 50 ML/HR: 5; 450 INJECTION, SOLUTION INTRAVENOUS at 16:32

## 2019-03-29 RX ADMIN — CEFAZOLIN 2 G: 1 INJECTION, POWDER, FOR SOLUTION INTRAMUSCULAR; INTRAVENOUS; PARENTERAL at 06:04

## 2019-03-29 RX ADMIN — SODIUM CHLORIDE, PRESERVATIVE FREE 3 ML: 5 INJECTION INTRAVENOUS at 20:55

## 2019-03-29 RX ADMIN — CEFTRIAXONE 1 G: 1 INJECTION, POWDER, FOR SOLUTION INTRAMUSCULAR; INTRAVENOUS at 04:37

## 2019-03-29 RX ADMIN — MORPHINE SULFATE 1 MG: 4 INJECTION INTRAVENOUS at 05:24

## 2019-03-29 RX ADMIN — FERROUS SULFATE TAB 325 MG (65 MG ELEMENTAL FE) 325 MG: 325 (65 FE) TAB at 08:19

## 2019-03-29 RX ADMIN — CARBAMAZEPINE 200 MG: 200 TABLET ORAL at 20:55

## 2019-03-29 RX ADMIN — FAMOTIDINE 20 MG: 20 TABLET, FILM COATED ORAL at 08:19

## 2019-03-29 RX ADMIN — DIPHENHYDRAMINE HYDROCHLORIDE 25 MG: 25 CAPSULE ORAL at 06:42

## 2019-03-29 RX ADMIN — ACETAMINOPHEN 650 MG: 325 TABLET, FILM COATED ORAL at 06:42

## 2019-03-29 RX ADMIN — LEVOTHYROXINE SODIUM 25 MCG: 25 TABLET ORAL at 04:37

## 2019-03-29 RX ADMIN — ENOXAPARIN SODIUM 30 MG: 30 INJECTION SUBCUTANEOUS at 08:19

## 2019-03-29 RX ADMIN — MIRTAZAPINE 15 MG: 15 TABLET, FILM COATED ORAL at 20:55

## 2019-03-29 RX ADMIN — HYDROCODONE BITARTRATE AND ACETAMINOPHEN 1 TABLET: 5; 325 TABLET ORAL at 16:31

## 2019-03-30 LAB
ANION GAP SERPL CALCULATED.3IONS-SCNC: 10.6 MMOL/L
BASOPHILS # BLD AUTO: 0.01 10*3/MM3 (ref 0–0.2)
BASOPHILS NFR BLD AUTO: 0.1 % (ref 0–1.5)
BUN BLD-MCNC: 24 MG/DL (ref 8–23)
BUN/CREAT SERPL: 26.1 (ref 7–25)
CALCIUM SPEC-SCNC: 8.1 MG/DL (ref 8.6–10.5)
CHLORIDE SERPL-SCNC: 100 MMOL/L (ref 98–107)
CO2 SERPL-SCNC: 26.4 MMOL/L (ref 22–29)
CREAT BLD-MCNC: 0.92 MG/DL (ref 0.57–1)
DEPRECATED RDW RBC AUTO: 50.9 FL (ref 37–54)
EOSINOPHIL # BLD AUTO: 0.01 10*3/MM3 (ref 0–0.4)
EOSINOPHIL NFR BLD AUTO: 0.1 % (ref 0.3–6.2)
ERYTHROCYTE [DISTWIDTH] IN BLOOD BY AUTOMATED COUNT: 15.7 % (ref 12.3–15.4)
GFR SERPL CREATININE-BSD FRML MDRD: 59 ML/MIN/1.73
GLUCOSE BLD-MCNC: 105 MG/DL (ref 65–99)
HCT VFR BLD AUTO: 23.8 % (ref 34–46.6)
HGB BLD-MCNC: 7.4 G/DL (ref 12–15.9)
IMM GRANULOCYTES # BLD AUTO: 0.01 10*3/MM3 (ref 0–0.05)
IMM GRANULOCYTES NFR BLD AUTO: 0.1 % (ref 0–0.5)
LYMPHOCYTES # BLD AUTO: 1.2 10*3/MM3 (ref 0.7–3.1)
LYMPHOCYTES NFR BLD AUTO: 17.2 % (ref 19.6–45.3)
MCH RBC QN AUTO: 28.2 PG (ref 26.6–33)
MCHC RBC AUTO-ENTMCNC: 31.1 G/DL (ref 31.5–35.7)
MCV RBC AUTO: 90.8 FL (ref 79–97)
MONOCYTES # BLD AUTO: 0.93 10*3/MM3 (ref 0.1–0.9)
MONOCYTES NFR BLD AUTO: 13.3 % (ref 5–12)
NEUTROPHILS # BLD AUTO: 4.83 10*3/MM3 (ref 1.4–7)
NEUTROPHILS NFR BLD AUTO: 69.2 % (ref 42.7–76)
PLATELET # BLD AUTO: 212 10*3/MM3 (ref 140–450)
PMV BLD AUTO: 8.9 FL (ref 6–12)
POTASSIUM BLD-SCNC: 4.5 MMOL/L (ref 3.5–5.2)
RBC # BLD AUTO: 2.62 10*6/MM3 (ref 3.77–5.28)
SODIUM BLD-SCNC: 137 MMOL/L (ref 136–145)
WBC NRBC COR # BLD: 6.99 10*3/MM3 (ref 3.4–10.8)

## 2019-03-30 PROCEDURE — 99024 POSTOP FOLLOW-UP VISIT: CPT | Performed by: ORTHOPAEDIC SURGERY

## 2019-03-30 PROCEDURE — 25010000002 FUROSEMIDE PER 20 MG: Performed by: HOSPITALIST

## 2019-03-30 PROCEDURE — 80048 BASIC METABOLIC PNL TOTAL CA: CPT | Performed by: HOSPITALIST

## 2019-03-30 PROCEDURE — 97110 THERAPEUTIC EXERCISES: CPT

## 2019-03-30 PROCEDURE — 97530 THERAPEUTIC ACTIVITIES: CPT

## 2019-03-30 PROCEDURE — 99232 SBSQ HOSP IP/OBS MODERATE 35: CPT | Performed by: HOSPITALIST

## 2019-03-30 PROCEDURE — 25010000002 MORPHINE PER 10 MG: Performed by: INTERNAL MEDICINE

## 2019-03-30 PROCEDURE — 25010000002 ENOXAPARIN PER 10 MG: Performed by: ORTHOPAEDIC SURGERY

## 2019-03-30 PROCEDURE — 94799 UNLISTED PULMONARY SVC/PX: CPT

## 2019-03-30 PROCEDURE — 85025 COMPLETE CBC W/AUTO DIFF WBC: CPT | Performed by: HOSPITALIST

## 2019-03-30 PROCEDURE — 25010000002 CEFTRIAXONE: Performed by: HOSPITALIST

## 2019-03-30 RX ORDER — FUROSEMIDE 10 MG/ML
20 INJECTION INTRAMUSCULAR; INTRAVENOUS ONCE
Status: COMPLETED | OUTPATIENT
Start: 2019-03-30 | End: 2019-03-30

## 2019-03-30 RX ADMIN — MORPHINE SULFATE 1 MG: 4 INJECTION INTRAVENOUS at 08:22

## 2019-03-30 RX ADMIN — OFLOXACIN 50000 UNITS: 300 TABLET, COATED ORAL at 08:10

## 2019-03-30 RX ADMIN — CARBOXYMETHYLCELLULOSE SODIUM 1 DROP: 5 SOLUTION/ DROPS OPHTHALMIC at 08:10

## 2019-03-30 RX ADMIN — LEVOTHYROXINE SODIUM 25 MCG: 25 TABLET ORAL at 04:57

## 2019-03-30 RX ADMIN — HYDROCODONE BITARTRATE AND ACETAMINOPHEN 1 TABLET: 5; 325 TABLET ORAL at 19:32

## 2019-03-30 RX ADMIN — FUROSEMIDE 20 MG: 10 INJECTION, SOLUTION INTRAMUSCULAR; INTRAVENOUS at 13:14

## 2019-03-30 RX ADMIN — CEFTRIAXONE 1 G: 1 INJECTION, POWDER, FOR SOLUTION INTRAMUSCULAR; INTRAVENOUS at 04:57

## 2019-03-30 RX ADMIN — MIRTAZAPINE 15 MG: 15 TABLET, FILM COATED ORAL at 19:31

## 2019-03-30 RX ADMIN — FAMOTIDINE 20 MG: 20 TABLET, FILM COATED ORAL at 08:10

## 2019-03-30 RX ADMIN — MORPHINE SULFATE 1 MG: 4 INJECTION INTRAVENOUS at 02:37

## 2019-03-30 RX ADMIN — CARBAMAZEPINE 200 MG: 200 TABLET ORAL at 08:10

## 2019-03-30 RX ADMIN — Medication 1 CAPSULE: at 08:10

## 2019-03-30 RX ADMIN — FERROUS SULFATE TAB 325 MG (65 MG ELEMENTAL FE) 325 MG: 325 (65 FE) TAB at 08:09

## 2019-03-30 RX ADMIN — ENOXAPARIN SODIUM 30 MG: 30 INJECTION SUBCUTANEOUS at 08:10

## 2019-03-30 RX ADMIN — CARBAMAZEPINE 200 MG: 200 TABLET ORAL at 19:32

## 2019-03-31 LAB
ANION GAP SERPL CALCULATED.3IONS-SCNC: 9.7 MMOL/L
BASOPHILS # BLD AUTO: 0.01 10*3/MM3 (ref 0–0.2)
BASOPHILS NFR BLD AUTO: 0.2 % (ref 0–1.5)
BUN BLD-MCNC: 20 MG/DL (ref 8–23)
BUN/CREAT SERPL: 27.4 (ref 7–25)
CALCIUM SPEC-SCNC: 7.8 MG/DL (ref 8.6–10.5)
CHLORIDE SERPL-SCNC: 100 MMOL/L (ref 98–107)
CO2 SERPL-SCNC: 27.3 MMOL/L (ref 22–29)
CREAT BLD-MCNC: 0.73 MG/DL (ref 0.57–1)
DEPRECATED RDW RBC AUTO: 49 FL (ref 37–54)
EOSINOPHIL # BLD AUTO: 0.01 10*3/MM3 (ref 0–0.4)
EOSINOPHIL NFR BLD AUTO: 0.2 % (ref 0.3–6.2)
ERYTHROCYTE [DISTWIDTH] IN BLOOD BY AUTOMATED COUNT: 15 % (ref 12.3–15.4)
GFR SERPL CREATININE-BSD FRML MDRD: 77 ML/MIN/1.73
GLUCOSE BLD-MCNC: 94 MG/DL (ref 65–99)
HCT VFR BLD AUTO: 23.3 % (ref 34–46.6)
HGB BLD-MCNC: 7 G/DL (ref 12–15.9)
IMM GRANULOCYTES # BLD AUTO: 0.01 10*3/MM3 (ref 0–0.05)
IMM GRANULOCYTES NFR BLD AUTO: 0.2 % (ref 0–0.5)
LYMPHOCYTES # BLD AUTO: 0.88 10*3/MM3 (ref 0.7–3.1)
LYMPHOCYTES NFR BLD AUTO: 14.2 % (ref 19.6–45.3)
MCH RBC QN AUTO: 27.6 PG (ref 26.6–33)
MCHC RBC AUTO-ENTMCNC: 30 G/DL (ref 31.5–35.7)
MCV RBC AUTO: 91.7 FL (ref 79–97)
MONOCYTES # BLD AUTO: 0.76 10*3/MM3 (ref 0.1–0.9)
MONOCYTES NFR BLD AUTO: 12.3 % (ref 5–12)
NEUTROPHILS # BLD AUTO: 4.51 10*3/MM3 (ref 1.4–7)
NEUTROPHILS NFR BLD AUTO: 72.9 % (ref 42.7–76)
PLATELET # BLD AUTO: 212 10*3/MM3 (ref 140–450)
PMV BLD AUTO: 9.1 FL (ref 6–12)
POTASSIUM BLD-SCNC: 3.6 MMOL/L (ref 3.5–5.2)
RBC # BLD AUTO: 2.54 10*6/MM3 (ref 3.77–5.28)
SODIUM BLD-SCNC: 137 MMOL/L (ref 136–145)
WBC NRBC COR # BLD: 6.18 10*3/MM3 (ref 3.4–10.8)

## 2019-03-31 PROCEDURE — 80048 BASIC METABOLIC PNL TOTAL CA: CPT | Performed by: HOSPITALIST

## 2019-03-31 PROCEDURE — 85025 COMPLETE CBC W/AUTO DIFF WBC: CPT | Performed by: HOSPITALIST

## 2019-03-31 PROCEDURE — 25010000002 MORPHINE PER 10 MG: Performed by: INTERNAL MEDICINE

## 2019-03-31 PROCEDURE — 99024 POSTOP FOLLOW-UP VISIT: CPT | Performed by: ORTHOPAEDIC SURGERY

## 2019-03-31 PROCEDURE — 25010000002 ENOXAPARIN PER 10 MG: Performed by: ORTHOPAEDIC SURGERY

## 2019-03-31 PROCEDURE — 25010000002 CEFTRIAXONE: Performed by: HOSPITALIST

## 2019-03-31 PROCEDURE — 99232 SBSQ HOSP IP/OBS MODERATE 35: CPT | Performed by: HOSPITALIST

## 2019-03-31 PROCEDURE — 94799 UNLISTED PULMONARY SVC/PX: CPT

## 2019-03-31 RX ORDER — FUROSEMIDE 10 MG/ML
20 INJECTION INTRAMUSCULAR; INTRAVENOUS ONCE
Status: DISCONTINUED | OUTPATIENT
Start: 2019-03-31 | End: 2019-03-31

## 2019-03-31 RX ADMIN — HYDROCODONE BITARTRATE AND ACETAMINOPHEN 1 TABLET: 5; 325 TABLET ORAL at 08:34

## 2019-03-31 RX ADMIN — CEFTRIAXONE 1 G: 1 INJECTION, POWDER, FOR SOLUTION INTRAMUSCULAR; INTRAVENOUS at 04:57

## 2019-03-31 RX ADMIN — MORPHINE SULFATE 1 MG: 4 INJECTION INTRAVENOUS at 02:26

## 2019-03-31 RX ADMIN — MIRTAZAPINE 15 MG: 15 TABLET, FILM COATED ORAL at 19:50

## 2019-03-31 RX ADMIN — Medication 1 CAPSULE: at 08:13

## 2019-03-31 RX ADMIN — HYDROCODONE BITARTRATE AND ACETAMINOPHEN 1 TABLET: 5; 325 TABLET ORAL at 19:51

## 2019-03-31 RX ADMIN — CARBAMAZEPINE 200 MG: 200 TABLET ORAL at 19:50

## 2019-03-31 RX ADMIN — MORPHINE SULFATE 1 MG: 4 INJECTION INTRAVENOUS at 13:55

## 2019-03-31 RX ADMIN — LEVOTHYROXINE SODIUM 25 MCG: 25 TABLET ORAL at 04:56

## 2019-03-31 RX ADMIN — ENOXAPARIN SODIUM 30 MG: 30 INJECTION SUBCUTANEOUS at 08:13

## 2019-03-31 RX ADMIN — SODIUM CHLORIDE, PRESERVATIVE FREE 3 ML: 5 INJECTION INTRAVENOUS at 08:14

## 2019-03-31 RX ADMIN — CARBAMAZEPINE 200 MG: 200 TABLET ORAL at 08:12

## 2019-03-31 RX ADMIN — FAMOTIDINE 20 MG: 20 TABLET, FILM COATED ORAL at 08:13

## 2019-03-31 RX ADMIN — FERROUS SULFATE TAB 325 MG (65 MG ELEMENTAL FE) 325 MG: 325 (65 FE) TAB at 08:12

## 2019-04-01 LAB
ABO + RH BLD: NORMAL
ABO + RH BLD: NORMAL
ABO GROUP BLD: NORMAL
ANION GAP SERPL CALCULATED.3IONS-SCNC: 9.1 MMOL/L
BASOPHILS # BLD AUTO: 0.01 10*3/MM3 (ref 0–0.2)
BASOPHILS # BLD AUTO: 0.02 10*3/MM3 (ref 0–0.2)
BASOPHILS NFR BLD AUTO: 0.2 % (ref 0–1.5)
BASOPHILS NFR BLD AUTO: 0.3 % (ref 0–1.5)
BH BB BLOOD EXPIRATION DATE: NORMAL
BH BB BLOOD EXPIRATION DATE: NORMAL
BH BB BLOOD TYPE BARCODE: 8400
BH BB BLOOD TYPE BARCODE: 8400
BH BB DISPENSE STATUS: NORMAL
BH BB DISPENSE STATUS: NORMAL
BH BB PRODUCT CODE: NORMAL
BH BB PRODUCT CODE: NORMAL
BH BB UNIT NUMBER: NORMAL
BH BB UNIT NUMBER: NORMAL
BLD GP AB SCN SERPL QL: NEGATIVE
BUN BLD-MCNC: 21 MG/DL (ref 8–23)
BUN/CREAT SERPL: 32.8 (ref 7–25)
CALCIUM SPEC-SCNC: 7.9 MG/DL (ref 8.6–10.5)
CHLORIDE SERPL-SCNC: 102 MMOL/L (ref 98–107)
CO2 SERPL-SCNC: 25.9 MMOL/L (ref 22–29)
CREAT BLD-MCNC: 0.64 MG/DL (ref 0.57–1)
DEPRECATED RDW RBC AUTO: 48.2 FL (ref 37–54)
DEPRECATED RDW RBC AUTO: 48.7 FL (ref 37–54)
EOSINOPHIL # BLD AUTO: 0.05 10*3/MM3 (ref 0–0.4)
EOSINOPHIL # BLD AUTO: 0.07 10*3/MM3 (ref 0–0.4)
EOSINOPHIL NFR BLD AUTO: 0.9 % (ref 0.3–6.2)
EOSINOPHIL NFR BLD AUTO: 1.2 % (ref 0.3–6.2)
ERYTHROCYTE [DISTWIDTH] IN BLOOD BY AUTOMATED COUNT: 15 % (ref 12.3–15.4)
ERYTHROCYTE [DISTWIDTH] IN BLOOD BY AUTOMATED COUNT: 15.1 % (ref 12.3–15.4)
GFR SERPL CREATININE-BSD FRML MDRD: 89 ML/MIN/1.73
GLUCOSE BLD-MCNC: 100 MG/DL (ref 65–99)
HCT VFR BLD AUTO: 22.9 % (ref 34–46.6)
HCT VFR BLD AUTO: 28.9 % (ref 34–46.6)
HGB BLD-MCNC: 7 G/DL (ref 12–15.9)
HGB BLD-MCNC: 9.1 G/DL (ref 12–15.9)
IMM GRANULOCYTES # BLD AUTO: 0.01 10*3/MM3 (ref 0–0.05)
IMM GRANULOCYTES # BLD AUTO: 0.03 10*3/MM3 (ref 0–0.05)
IMM GRANULOCYTES NFR BLD AUTO: 0.2 % (ref 0–0.5)
IMM GRANULOCYTES NFR BLD AUTO: 0.5 % (ref 0–0.5)
LYMPHOCYTES # BLD AUTO: 0.9 10*3/MM3 (ref 0.7–3.1)
LYMPHOCYTES # BLD AUTO: 0.93 10*3/MM3 (ref 0.7–3.1)
LYMPHOCYTES NFR BLD AUTO: 15.4 % (ref 19.6–45.3)
LYMPHOCYTES NFR BLD AUTO: 16.9 % (ref 19.6–45.3)
MCH RBC QN AUTO: 28 PG (ref 26.6–33)
MCH RBC QN AUTO: 28.3 PG (ref 26.6–33)
MCHC RBC AUTO-ENTMCNC: 30.6 G/DL (ref 31.5–35.7)
MCHC RBC AUTO-ENTMCNC: 31.5 G/DL (ref 31.5–35.7)
MCV RBC AUTO: 89.8 FL (ref 79–97)
MCV RBC AUTO: 91.6 FL (ref 79–97)
MONOCYTES # BLD AUTO: 0.65 10*3/MM3 (ref 0.1–0.9)
MONOCYTES # BLD AUTO: 0.7 10*3/MM3 (ref 0.1–0.9)
MONOCYTES NFR BLD AUTO: 11.8 % (ref 5–12)
MONOCYTES NFR BLD AUTO: 12 % (ref 5–12)
NEUTROPHILS # BLD AUTO: 3.84 10*3/MM3 (ref 1.4–7)
NEUTROPHILS # BLD AUTO: 4.13 10*3/MM3 (ref 1.4–7)
NEUTROPHILS NFR BLD AUTO: 69.7 % (ref 42.7–76)
NEUTROPHILS NFR BLD AUTO: 70.9 % (ref 42.7–76)
PLATELET # BLD AUTO: 226 10*3/MM3 (ref 140–450)
PLATELET # BLD AUTO: 235 10*3/MM3 (ref 140–450)
PMV BLD AUTO: 8.6 FL (ref 6–12)
PMV BLD AUTO: 9 FL (ref 6–12)
POTASSIUM BLD-SCNC: 4.2 MMOL/L (ref 3.5–5.2)
RBC # BLD AUTO: 2.5 10*6/MM3 (ref 3.77–5.28)
RBC # BLD AUTO: 3.22 10*6/MM3 (ref 3.77–5.28)
RH BLD: POSITIVE
SODIUM BLD-SCNC: 137 MMOL/L (ref 136–145)
T&S EXPIRATION DATE: NORMAL
UNIT  ABO: NORMAL
UNIT  ABO: NORMAL
UNIT  RH: NORMAL
UNIT  RH: NORMAL
WBC NRBC COR # BLD: 5.51 10*3/MM3 (ref 3.4–10.8)
WBC NRBC COR # BLD: 5.83 10*3/MM3 (ref 3.4–10.8)

## 2019-04-01 PROCEDURE — 99232 SBSQ HOSP IP/OBS MODERATE 35: CPT | Performed by: INTERNAL MEDICINE

## 2019-04-01 PROCEDURE — 85025 COMPLETE CBC W/AUTO DIFF WBC: CPT | Performed by: HOSPITALIST

## 2019-04-01 PROCEDURE — 36430 TRANSFUSION BLD/BLD COMPNT: CPT

## 2019-04-01 PROCEDURE — 86923 COMPATIBILITY TEST ELECTRIC: CPT

## 2019-04-01 PROCEDURE — 25010000002 CYANOCOBALAMIN PER 1000 MCG: Performed by: PHYSICIAN ASSISTANT

## 2019-04-01 PROCEDURE — 94799 UNLISTED PULMONARY SVC/PX: CPT

## 2019-04-01 PROCEDURE — 86900 BLOOD TYPING SEROLOGIC ABO: CPT

## 2019-04-01 PROCEDURE — 25010000002 ENOXAPARIN PER 10 MG: Performed by: ORTHOPAEDIC SURGERY

## 2019-04-01 PROCEDURE — 86900 BLOOD TYPING SEROLOGIC ABO: CPT | Performed by: INTERNAL MEDICINE

## 2019-04-01 PROCEDURE — 25010000002 CEFTRIAXONE: Performed by: HOSPITALIST

## 2019-04-01 PROCEDURE — 80053 COMPREHEN METABOLIC PANEL: CPT | Performed by: HOSPITALIST

## 2019-04-01 PROCEDURE — 85025 COMPLETE CBC W/AUTO DIFF WBC: CPT | Performed by: INTERNAL MEDICINE

## 2019-04-01 PROCEDURE — P9016 RBC LEUKOCYTES REDUCED: HCPCS

## 2019-04-01 PROCEDURE — 93010 ELECTROCARDIOGRAM REPORT: CPT | Performed by: INTERNAL MEDICINE

## 2019-04-01 PROCEDURE — 99024 POSTOP FOLLOW-UP VISIT: CPT | Performed by: ORTHOPAEDIC SURGERY

## 2019-04-01 PROCEDURE — 86850 RBC ANTIBODY SCREEN: CPT | Performed by: INTERNAL MEDICINE

## 2019-04-01 PROCEDURE — 25010000002 MORPHINE PER 10 MG: Performed by: INTERNAL MEDICINE

## 2019-04-01 PROCEDURE — 93005 ELECTROCARDIOGRAM TRACING: CPT | Performed by: INTERNAL MEDICINE

## 2019-04-01 PROCEDURE — 86901 BLOOD TYPING SEROLOGIC RH(D): CPT | Performed by: INTERNAL MEDICINE

## 2019-04-01 RX ORDER — SODIUM CHLORIDE 9 MG/ML
INJECTION, SOLUTION INTRAVENOUS
Status: COMPLETED
Start: 2019-04-01 | End: 2019-04-01

## 2019-04-01 RX ORDER — ACETAMINOPHEN 325 MG/1
650 TABLET ORAL ONCE
Status: COMPLETED | OUTPATIENT
Start: 2019-04-01 | End: 2019-04-01

## 2019-04-01 RX ORDER — MULTIVITAMIN
1 TABLET ORAL DAILY
Status: DISCONTINUED | OUTPATIENT
Start: 2019-04-01 | End: 2019-04-02 | Stop reason: HOSPADM

## 2019-04-01 RX ADMIN — ENOXAPARIN SODIUM 30 MG: 30 INJECTION SUBCUTANEOUS at 09:23

## 2019-04-01 RX ADMIN — LEVOTHYROXINE SODIUM 25 MCG: 25 TABLET ORAL at 05:22

## 2019-04-01 RX ADMIN — Medication 1 CAPSULE: at 09:23

## 2019-04-01 RX ADMIN — CYANOCOBALAMIN 1000 MCG: 1000 INJECTION, SOLUTION INTRAMUSCULAR at 09:23

## 2019-04-01 RX ADMIN — MORPHINE SULFATE 1 MG: 4 INJECTION INTRAVENOUS at 18:23

## 2019-04-01 RX ADMIN — CARBAMAZEPINE 200 MG: 200 TABLET ORAL at 20:13

## 2019-04-01 RX ADMIN — CARBOXYMETHYLCELLULOSE SODIUM 1 DROP: 5 SOLUTION/ DROPS OPHTHALMIC at 20:13

## 2019-04-01 RX ADMIN — ACETAMINOPHEN 650 MG: 325 TABLET, FILM COATED ORAL at 14:58

## 2019-04-01 RX ADMIN — SODIUM CHLORIDE 250 ML: 9 INJECTION, SOLUTION INTRAVENOUS at 17:07

## 2019-04-01 RX ADMIN — FAMOTIDINE 20 MG: 20 TABLET, FILM COATED ORAL at 09:23

## 2019-04-01 RX ADMIN — SODIUM CHLORIDE, PRESERVATIVE FREE 3 ML: 5 INJECTION INTRAVENOUS at 09:25

## 2019-04-01 RX ADMIN — SODIUM CHLORIDE, PRESERVATIVE FREE 3 ML: 5 INJECTION INTRAVENOUS at 20:43

## 2019-04-01 RX ADMIN — CEFTRIAXONE 1 G: 1 INJECTION, POWDER, FOR SOLUTION INTRAMUSCULAR; INTRAVENOUS at 05:22

## 2019-04-01 RX ADMIN — FERROUS SULFATE TAB 325 MG (65 MG ELEMENTAL FE) 325 MG: 325 (65 FE) TAB at 09:23

## 2019-04-01 RX ADMIN — CARBOXYMETHYLCELLULOSE SODIUM 1 DROP: 5 SOLUTION/ DROPS OPHTHALMIC at 09:24

## 2019-04-01 RX ADMIN — MORPHINE SULFATE 1 MG: 4 INJECTION INTRAVENOUS at 02:39

## 2019-04-01 RX ADMIN — CARBAMAZEPINE 200 MG: 200 TABLET ORAL at 09:23

## 2019-04-01 RX ADMIN — HYDROCODONE BITARTRATE AND ACETAMINOPHEN 1 TABLET: 5; 325 TABLET ORAL at 23:34

## 2019-04-01 RX ADMIN — MIRTAZAPINE 15 MG: 15 TABLET, FILM COATED ORAL at 20:13

## 2019-04-01 RX ADMIN — Medication 1 TABLET: at 17:06

## 2019-04-01 NOTE — PROGRESS NOTES
Subjective     History:   Mary Lou Graves is a 83 y.o. female admitted on 3/28/2019 secondary to Closed displaced intertrochanteric fracture of right femur (CMS/HCC)     Procedures:   3/38/29: Right hip intertrochanteric nailing    Transfusions:  3/29/19: 1 unit of PRBC's     CC: Follow up hip fracture    Patient seen and examined with LEYLA Teixeira. Awake and alert. Pleasantly confused and unable to provide any history. Sitter present at bedside. Pt denies any complaints at present. RN reports pt c/o CP earlier this AM. No acute events overnight per RN.     History taken from: chart, and RN.      Objective     Vital Signs  Temp:  [96.8 °F (36 °C)-98.4 °F (36.9 °C)] 98.4 °F (36.9 °C)  Heart Rate:  [70-90] 90  Resp:  [18-20] 20  BP: ()/(49-81) 147/81    Intake/Output Summary (Last 24 hours) at 4/1/2019 1930  Last data filed at 4/1/2019 1921  Gross per 24 hour   Intake 300 ml   Output 550 ml   Net -250 ml         Physical Exam:  General:    Awake, alert but confused, in no acute distress, chronically ill appearing    Heart:      Normal S1 and S2. Regular rate and rhythm. No significant murmur, rubs or gallops appreciated.   Lungs:     Respirations regular, even and unlabored. Lungs clear to auscultation B/L. No wheezes, rales or rhonchi.   Abdomen:   Soft and nontender. No guarding, rebound tenderness or  organomegaly noted. Bowel sounds present x 4.   Extremities:  No clubbing, cyanosis or edema noted. Moves UE and LE equally B/L. Right hip dressing appears c/d/i.      Results Review:    Results from last 7 days   Lab Units 04/01/19  0517 03/31/19  0205 03/30/19  0151 03/29/19  1722 03/29/19  1507 03/29/19  0253 03/28/19  0435   WBC 10*3/mm3 5.83 6.18 6.99 7.16  --  9.10 8.53   HEMOGLOBIN g/dL 7.0* 7.0* 7.4* 7.4* 8.0* 6.3* 8.3*   PLATELETS 10*3/mm3 226 212 212 232  --  260 301     Results from last 7 days   Lab Units 04/01/19  0517 03/31/19  0205 03/30/19  0151 03/29/19  0228 03/28/19  0435   SODIUM mmol/L 137 137  137 139 135*   POTASSIUM mmol/L 4.2 3.6 4.5 5.2 3.8   CHLORIDE mmol/L 102 100 100 102 99   CO2 mmol/L 25.9 27.3 26.4 26.1 26.5   BUN mg/dL 21 20 24* 24* 15   CREATININE mg/dL 0.64 0.73 0.92 1.06* 0.68   CALCIUM mg/dL 7.9* 7.8* 8.1* 8.0* 8.2*   GLUCOSE mg/dL 100* 94 105* 122* 170*     Results from last 7 days   Lab Units 03/29/19 0228 03/28/19  0435   BILIRUBIN mg/dL 0.2 0.3   ALK PHOS U/L 104 115   AST (SGOT) U/L 17 15   ALT (SGPT) U/L 14 10     Results from last 7 days   Lab Units 03/29/19  0228   MAGNESIUM mg/dL 1.9     Results from last 7 days   Lab Units 03/29/19 0228   INR  1.24*           Imaging Results (last 24 hours)     ** No results found for the last 24 hours. **            Medications:    carBAMazepine 200 mg Oral BID   carboxymethylcellulose 1 drop Both Eyes BID   ceftriaxone 1 g Intravenous Q24H   cholecalciferol 50,000 Units Oral Weekly   cyanocobalamin 1,000 mcg Intramuscular Weekly   enoxaparin 30 mg Subcutaneous Daily   famotidine 20 mg Oral Daily   ferrous sulfate 325 mg Oral Daily With Breakfast   lactobacillus acidophilus 1 capsule Oral Daily   levothyroxine 25 mcg Oral Q AM   mirtazapine 15 mg Oral Nightly   multivitamin 1 tablet Oral Daily   sodium chloride 3 mL Intravenous Q12H              Assessment/Plan   Right trochanteric hip fracture: S/P ORIF. Cont post-op management per ortho. PT/OT as tolerated. Ortho input appreciated.     UTI: Culture not obtained. Currently on Rocephin empirically.     Acute on chronic diastolic CHF: Improved with IV Lasix and appears euvolemic at present. Cont to monitor.     Acute on chronic normocytic anemia with component of acute blood loss: S/P 1 unit of PRBC's earlier this admission. Hgb remains at 7 with reported CP this AM and intermittent borderline hypotension. Will order additional unit of PRBC's today. Cont to monitor.     Dementia: Cont supportive treatment.    DVT PPX: SQ Lovenox    Discussed with Dr. Burris.     Disposition: Anticipate back  to NH soon.       Kosta Brown,   04/01/19  7:30 PM

## 2019-04-01 NOTE — PROGRESS NOTES
Nutrition Services    Patient Name:  Mary Lou Graves  YOB: 1935  MRN: [id number]  Admit Date:  3/28/2019    Recommend add MVI daily.     Thank you    Electronically signed by:  Beata Flores RD  04/01/19 2:54 PM

## 2019-04-01 NOTE — PROGRESS NOTES
Postop day #4 status post open reduction internal fixation of right hip intertrochanteric fracture    Patient is sleeping this morning I did not wake her up  Afebrile, vital signs have been stable  H&H 7.0/ 22.9    Some mild thigh swelling and bruising noted but essentially unchanged.  Right lower leg shows no obvious calf tenderness or swelling    Stable from orthopedic standpoint.  Continue bed to chair.  She can be touchdown weightbearing but because of her mental status that may be very difficult.  If discharge we will see her in approximately 10 days in the office

## 2019-04-01 NOTE — PROGRESS NOTES
Discharge Planning Assessment  YAN Kate     Patient Name: Mary Lou Graves  MRN: [id number]  Today's Date: 4/1/2019    Admit Date: 3/28/2019      Discharge Plan     Row Name 04/01/19 1153       Plan    Plan  [facility] Pt has a private pay bed hold at this time. SS will follow and assist as needed.     Patient/Family in Agreement with Plan  yes              Olena Oneil

## 2019-04-01 NOTE — SIGNIFICANT NOTE
04/01/19 1524   Rehab Treatment   Discipline physical therapist   Reason Treatment Not Performed unavailable for treatment  (Pt unable to participate with PT. She was very confused and could not focus on tasks)

## 2019-04-02 VITALS
RESPIRATION RATE: 18 BRPM | WEIGHT: 92.4 LBS | TEMPERATURE: 98.1 F | OXYGEN SATURATION: 99 % | DIASTOLIC BLOOD PRESSURE: 53 MMHG | BODY MASS INDEX: 12.51 KG/M2 | HEIGHT: 72 IN | SYSTOLIC BLOOD PRESSURE: 104 MMHG | HEART RATE: 70 BPM

## 2019-04-02 LAB
ABO + RH BLD: NORMAL
ALBUMIN SERPL-MCNC: 3.05 G/DL (ref 3.5–5.2)
ALBUMIN/GLOB SERPL: 1.2 G/DL
ALP SERPL-CCNC: 126 U/L (ref 39–117)
ALT SERPL W P-5'-P-CCNC: 6 U/L (ref 1–33)
ANION GAP SERPL CALCULATED.3IONS-SCNC: 9.7 MMOL/L
AST SERPL-CCNC: 20 U/L (ref 1–32)
BACTERIA SPEC AEROBE CULT: NORMAL
BH BB BLOOD EXPIRATION DATE: NORMAL
BH BB BLOOD TYPE BARCODE: 8400
BH BB DISPENSE STATUS: NORMAL
BH BB PRODUCT CODE: NORMAL
BH BB UNIT NUMBER: NORMAL
BILIRUB SERPL-MCNC: 1.1 MG/DL (ref 0.2–1.2)
BUN BLD-MCNC: 19 MG/DL (ref 8–23)
BUN/CREAT SERPL: 28.4 (ref 7–25)
CALCIUM SPEC-SCNC: 8.4 MG/DL (ref 8.6–10.5)
CHLORIDE SERPL-SCNC: 100 MMOL/L (ref 98–107)
CO2 SERPL-SCNC: 27.3 MMOL/L (ref 22–29)
CREAT BLD-MCNC: 0.67 MG/DL (ref 0.57–1)
GFR SERPL CREATININE-BSD FRML MDRD: 84 ML/MIN/1.73
GLOBULIN UR ELPH-MCNC: 2.7 GM/DL
GLUCOSE BLD-MCNC: 93 MG/DL (ref 65–99)
POTASSIUM BLD-SCNC: 4.1 MMOL/L (ref 3.5–5.2)
PROT SERPL-MCNC: 5.7 G/DL (ref 6–8.5)
SODIUM BLD-SCNC: 137 MMOL/L (ref 136–145)
UNIT  ABO: NORMAL
UNIT  RH: NORMAL

## 2019-04-02 PROCEDURE — 25010000002 CEFTRIAXONE: Performed by: HOSPITALIST

## 2019-04-02 PROCEDURE — 99239 HOSP IP/OBS DSCHRG MGMT >30: CPT | Performed by: INTERNAL MEDICINE

## 2019-04-02 PROCEDURE — 25010000002 ENOXAPARIN PER 10 MG: Performed by: ORTHOPAEDIC SURGERY

## 2019-04-02 PROCEDURE — 94799 UNLISTED PULMONARY SVC/PX: CPT

## 2019-04-02 RX ORDER — L.ACID,PARA/B.BIFIDUM/S.THERM 8B CELL
1 CAPSULE ORAL DAILY
Qty: 2 CAPSULE | Refills: 0
Start: 2019-04-03 | End: 2019-04-05

## 2019-04-02 RX ORDER — MULTIVITAMIN
1 TABLET ORAL DAILY
Start: 2019-04-03

## 2019-04-02 RX ORDER — ACETAMINOPHEN 325 MG/1
650 TABLET ORAL EVERY 4 HOURS PRN
Start: 2019-04-02 | End: 2019-06-27

## 2019-04-02 RX ORDER — HYDROCODONE BITARTRATE AND ACETAMINOPHEN 5; 325 MG/1; MG/1
1 TABLET ORAL EVERY 4 HOURS PRN
Qty: 12 TABLET | Refills: 0 | Status: SHIPPED | OUTPATIENT
Start: 2019-04-02 | End: 2019-04-07

## 2019-04-02 RX ORDER — CEFDINIR 300 MG/1
300 CAPSULE ORAL EVERY 12 HOURS SCHEDULED
Status: DISCONTINUED | OUTPATIENT
Start: 2019-04-03 | End: 2019-04-02 | Stop reason: HOSPADM

## 2019-04-02 RX ORDER — CEFDINIR 300 MG/1
300 CAPSULE ORAL EVERY 12 HOURS SCHEDULED
Qty: 4 CAPSULE | Refills: 0 | Status: SHIPPED | OUTPATIENT
Start: 2019-04-03 | End: 2019-04-05

## 2019-04-02 RX ADMIN — CARBOXYMETHYLCELLULOSE SODIUM 1 DROP: 5 SOLUTION/ DROPS OPHTHALMIC at 08:19

## 2019-04-02 RX ADMIN — CEFTRIAXONE 1 G: 1 INJECTION, POWDER, FOR SOLUTION INTRAMUSCULAR; INTRAVENOUS at 05:21

## 2019-04-02 RX ADMIN — SODIUM CHLORIDE, PRESERVATIVE FREE 3 ML: 5 INJECTION INTRAVENOUS at 08:22

## 2019-04-02 RX ADMIN — Medication 1 TABLET: at 08:20

## 2019-04-02 RX ADMIN — FERROUS SULFATE TAB 325 MG (65 MG ELEMENTAL FE) 325 MG: 325 (65 FE) TAB at 08:19

## 2019-04-02 RX ADMIN — Medication 1 CAPSULE: at 08:19

## 2019-04-02 RX ADMIN — HYDROCODONE BITARTRATE AND ACETAMINOPHEN 1 TABLET: 5; 325 TABLET ORAL at 10:09

## 2019-04-02 RX ADMIN — CARBAMAZEPINE 200 MG: 200 TABLET ORAL at 08:20

## 2019-04-02 RX ADMIN — LEVOTHYROXINE SODIUM 25 MCG: 25 TABLET ORAL at 05:21

## 2019-04-02 RX ADMIN — FAMOTIDINE 20 MG: 20 TABLET, FILM COATED ORAL at 08:20

## 2019-04-02 RX ADMIN — ENOXAPARIN SODIUM 30 MG: 30 INJECTION SUBCUTANEOUS at 08:20

## 2019-04-02 NOTE — DISCHARGE PLACEMENT REQUEST
"Mary Lou Graves (83 y.o. Female)     Date of Birth Social Security Number Address Home Phone MRN    1935  [facility]  [address] [place] [address] [phone] [id number]    Hoahaoism Marital Status          None        Admission Date Admission Type Admitting Provider Attending Provider Department, Room/Bed    3/28/19 Emergency Carina Almaguer DO Troxell, Christopher A, DO [address], 3309/1S    Discharge Date Discharge Disposition Discharge Destination         Skilled Nursing Facility (DC - External)              Attending Provider:  Kosta Brown DO    Allergies:  No Known Allergies    Isolation:  None   Infection:  None   Code Status:  CPR    Ht:  185.4 cm (72.99\")   Wt:  41.9 kg (92 lb 6.4 oz)    Admission Cmt:  None   Principal Problem:  Closed displaced intertrochanteric fracture of right femur (CMS/[facility]) [S72.141A] More...                 Active Insurance as of 3/28/2019     Primary Coverage     Payor Plan Insurance Group Employer/Plan Group    MEDICARE MEDICARE A & B      Payor Plan Address Payor Plan Phone Number Payor Plan Fax Number Effective Dates    [address] [phone]  1/1/1989 - None Entered    [place] [address]       Subscriber Name Subscriber Birth Date Member ID       MARY LOU GRAVES 1935 [id number]                 Emergency Contacts      (Rel.) Home Phone Work Phone Mobile Phone    SKIP(son)LEONIDES (Guardian) [phone] -- [phone]    RACHAEL GRAVES (Spouse) [phone] -- [phone]            Emergency Contact Information     Name Relation Home Work Mobile    SKIP(son)LEONIDES Guardian [phone]    RACHAEL GRAVES Spouse [phone]          Insurance Information                MEDICARE/MEDICARE A & B Phone: [phone]    Subscriber: Mary Lou Graves Subscriber#: [id number]    Group#:  Precert#:              History & Physical    "   Carina Almaguer DO at 3/28/2019  6:38 AM          Hospitalist History and Physical    Patient Identification:  Name: Mary Lou Graves  Age/Sex: 83 y.o. female  :  1935  MRN: [id number]         Primary Care Physician: London Merritt MD    Chief Complaint   Patient presents with   • Fall   • Hip Pain       History of Present Illness  Patient is a 83 y.o. female presents with the following: Right hip pain status post fall    The patient is an 83-year-old female, [facility] resident with past medical history significant for dementia, hypertension and tobacco abuse who presents to the emergency department after suffering a fall from her bed, landing on her right hip.    Please note that the patient has dementia and I am unable to obtain any history from her.  According to the emergency department notes, the patient is confused at baseline but is ambulatory.    The patient was placed on the observation service from 2019-2019 for failure to thrive.  It was at that time that the patient was discharged to the above-mentioned nursing home.    Imaging revealed a right femoral intertrochanteric fracture.  Basic laboratory studies revealed a hemoglobin of 8.3 and a hematocrit of 27.1.  Patient's hemoglobin was 11.3 in 2019 with a hematocrit of 35.8.  CMP was largely unremarkable with the exception of a glucose of 170, sodium 135 and albumin 2.99.  EKG revealed a ventricular paced rhythm in the 70s.    Past History:  Past Medical History:   Diagnosis Date   • Dementia    • Depression    • Hyperlipidemia    • Hypertension      Past Surgical History:   Procedure Laterality Date   • HYSTERECTOMY     • PACEMAKER IMPLANTATION       History reviewed. No pertinent family history.  Social History     Tobacco Use   • Smoking status: Current Every Day Smoker     Packs/day: 0.50   • Smokeless tobacco: Never Used   Substance Use Topics   • Alcohol use: No     Frequency: Never      Comment: denies   • Drug use: No     Comment: denies       (Not in a hospital admission)  Allergies: Patient has no known allergies.    Review of Systems:  Review of Systems   Unable to perform ROS: Dementia      Vital Signs  Temp:  [97.8 °F (36.6 °C)] 97.8 °F (36.6 °C)  Heart Rate:  [70-80] 70  Resp:  [18] 18  BP: ()/(57-68) 110/62  Body mass index is 12.53 kg/m².    Physical Exam:  Physical Exam   Constitutional: She appears well-developed and well-nourished. She appears cachectic. No distress. Nasal cannula in place.   Chronically ill-appearing.   HENT:   Head: Normocephalic and atraumatic.   Mouth/Throat: Oropharynx is clear and moist.   Eyes: Conjunctivae and EOM are normal. Pupils are equal, round, and reactive to light.   Neck: Neck supple. No tracheal deviation present. No thyromegaly present.   Cardiovascular: Normal rate and regular rhythm. Exam reveals no gallop and no friction rub.   No murmur heard.  Pulmonary/Chest: Breath sounds normal. No respiratory distress. She has no wheezes. She has no rales.   Coarse bilateral breath sounds without significant wheezing/rhonchi/rales.   Abdominal: Soft. Bowel sounds are normal. She exhibits no distension. There is no tenderness. There is no guarding.   Genitourinary:   Genitourinary Comments: Perdomo catheter in place.   Musculoskeletal: She exhibits no tenderness.        Right hip: She exhibits decreased range of motion, swelling (With hematoma.) and deformity (Externally rotated.).   Neurological: She is alert.   Patient awake and alert but confused.  She follows some basic commands.   Skin: Skin is warm and dry. No rash noted. No erythema.      Results Review:    Telemetry: Paced in the 70s-80s    Results from last 7 days   Lab Units 03/28/19  0435   WBC 10*3/mm3 8.53   HEMOGLOBIN g/dL 8.3*   HEMATOCRIT % 27.1*   PLATELETS 10*3/mm3 301     Results from last 7 days   Lab Units 03/28/19  0435   SODIUM mmol/L 135*   POTASSIUM mmol/L 3.8   CHLORIDE mmol/L 99    CO2 mmol/L 26.5   BUN mg/dL 15   CREATININE mg/dL 0.68   CALCIUM mg/dL 8.2*   GLUCOSE mg/dL 170*     Results from last 7 days   Lab Units 03/28/19  0435   BILIRUBIN mg/dL 0.3   ALK PHOS U/L 115   AST (SGOT) U/L 15   ALT (SGPT) U/L 10         Imaging:    I have personally reviewed the EKG. pending official cardiology interpretation, however, per my view patient has a ventricular paced rhythm in the 70s.    Imaging Results (most recent)     Procedure Component Value Units Date/Time    XR Hip With or Without Pelvis 2 - 3 View Right [[id number]] Updated:  03/28/19 0420        *Pending official radiology interpretation, however, per my view the patient has a right intertrochanteric femoral fracture.    Assessment/Plan     -Acute, traumatic, closed right intertrochanteric femoral fracture: Patient will be admitted to the hospital medicine service with strict bedrest. She is currently nothing by mouth while awaiting orthopedic surgery evaluation. A PT/INR has been ordered.  I have requested that the patient be typed and screened.  Will continue with low dose as needed IV analgesics.     Patient is considered to be a low to moderate risk patient for this intermediate risk procedure.     -Acute blood loss anemia on top of chronic normocytic anemia that may be related to her right hip hematoma: The patient will be typed and screened.  Continue to monitor her H/H closely and transfuse for hemoglobin less than 7.0.    -Asymptomatic bacteriuria: Patient was given a dose of IV Rocephin in the emergency department.  A urine culture has been requested.  Will hold on further IV antibiotic therapy, but this may be used as her prophylactic antibiotics prior to surgery.    -Tobacco abuse: Obtain preoperative portable chest x-ray.  Make nebulized inhalants as needed.    -Mild hyponatremia: Will monitor; repeat chemistries in am.     -Pseudo-hypocalcemia due to hypoalbuminemia: Corrected calcium of 9.008.    -Dementia: Awaiting  "patient's home medication list. Patient may require a sitter.    -Hypothyroidism: Patient's TSH was within normal limits in February 2019.  I am currently awaiting her medication list; plan to resume her home dose of Synthroid.    DVT prophylaxis: SCD on left leg    Estimated Length of Stay: > 2 MNs    CODE: FULL/CPR    I discussed the patients findings and my recommendations with ED provider, RICKIE Rivas DO  03/28/19  6:43 AM    Electronically signed by Carina Almaguer DO at 3/28/2019  7:01 AM       Hospital Medications (active)       Dose Frequency Start End    acetaminophen (TYLENOL) 160 MG/5ML solution 650 mg 650 mg Every 4 Hours PRN 3/28/2019     Sig - Route: Take 20.3 mL by mouth Every 4 (Four) Hours As Needed for Mild Pain . - Oral    Linked Group 1:  \"Or\" Linked Group Details        acetaminophen (TYLENOL) suppository 650 mg 650 mg Every 4 Hours PRN 3/28/2019     Sig - Route: Insert 1 suppository into the rectum Every 4 (Four) Hours As Needed for Mild Pain . - Rectal    Linked Group 1:  \"Or\" Linked Group Details        acetaminophen (TYLENOL) tablet 650 mg 650 mg Every 4 Hours PRN 3/28/2019     Sig - Route: Take 2 tablets by mouth Every 4 (Four) Hours As Needed for Mild Pain . - Oral    Linked Group 1:  \"Or\" Linked Group Details        acetaminophen (TYLENOL) tablet 650 mg 650 mg Once 4/1/2019 4/1/2019    Sig - Route: Take 2 tablets by mouth 1 (One) Time. - Oral    bisacodyl (DULCOLAX) suppository 10 mg 10 mg Daily PRN 3/28/2019     Sig - Route: Insert 1 suppository into the rectum Daily As Needed for Constipation. - Rectal    carBAMazepine (TEGretol) tablet 200 mg 200 mg 2 Times Daily 3/28/2019     Sig - Route: Take 1 tablet by mouth 2 (Two) Times a Day. - Oral    Cosign for Ordering: Accepted by Lana Pearson MD on 3/29/2019 10:09 AM    carboxymethylcellulose (REFRESH PLUS) 0.5 % ophthalmic solution 1 drop 1 drop 2 Times Daily 3/28/2019     Sig - Route: " Administer 1 drop to both eyes 2 (Two) Times a Day. - Both Eyes    Cosign for Ordering: Accepted by Lana Pearson MD on 3/29/2019 10:09 AM    cefdinir (OMNICEF) capsule 300 mg 300 mg Every 12 Hours Scheduled 4/3/2019 4/5/2019    Sig - Route: Take 1 capsule by mouth Every 12 (Twelve) Hours. - Oral    cholecalciferol (VITAMIN D3) capsule 50,000 Units 50,000 Units Weekly 3/30/2019     Sig - Route: Take 1 capsule by mouth 1 (One) Time Per Week. - Oral    Cosign for Ordering: Accepted by Lana Pearson MD on 3/29/2019 10:09 AM    cyanocobalamin injection 1,000 mcg 1,000 mcg Weekly 4/1/2019     Sig - Route: Inject 1 mL into the appropriate muscle as directed by prescriber 1 (One) Time Per Week. - Intramuscular    Cosign for Ordering: Accepted by Lana Pearson MD on 3/29/2019 10:09 AM    enoxaparin (LOVENOX) syringe 30 mg 30 mg Daily 3/29/2019     Sig - Route: Inject 0.3 mL under the skin into the appropriate area as directed Daily. - Subcutaneous    famotidine (PEPCID) tablet 20 mg 20 mg Daily 3/28/2019     Sig - Route: Take 1 tablet by mouth Daily. - Oral    Cosign for Ordering: Accepted by Lana Pearson MD on 3/29/2019 10:09 AM    ferrous sulfate tablet 325 mg 325 mg Daily With Breakfast 3/28/2019     Sig - Route: Take 1 tablet by mouth Daily With Breakfast. - Oral    Cosign for Ordering: Accepted by Lana Pearson MD on 3/29/2019 10:09 AM    HYDROcodone-acetaminophen (NORCO) 5-325 MG per tablet 1 tablet 1 tablet Every 4 Hours PRN 3/28/2019 4/7/2019    Sig - Route: Take 1 tablet by mouth Every 4 (Four) Hours As Needed for Moderate Pain . - Oral    lactobacillus acidophilus (RISAQUAD) capsule 1 capsule 1 capsule Daily 3/29/2019     Sig - Route: Take 1 capsule by mouth Daily. - Oral    levothyroxine (SYNTHROID, LEVOTHROID) tablet 25 mcg 25 mcg Every Early Morning 3/28/2019     Sig - Route: Take 1 tablet by mouth Every Morning. - Oral    Cosign for Ordering: Accepted by Lana Pearson,  "MD on 3/29/2019 10:09 AM    mirtazapine (REMERON) tablet 15 mg 15 mg Nightly 3/28/2019     Sig - Route: Take 1 tablet by mouth Every Night. - Oral    Cosign for Ordering: Accepted by Lana Pearson MD on 3/29/2019 10:09 AM    morphine injection 1 mg 1 mg Every 4 Hours PRN 3/28/2019     Sig - Route: Infuse 0.25 mL into a venous catheter Every 4 (Four) Hours As Needed for Severe Pain . - Intravenous    multivitamin (DAILY CARMELA) tablet 1 tablet 1 tablet Daily 4/1/2019     Sig - Route: Take 1 tablet by mouth Daily. - Oral    nitroglycerin (NITROSTAT) SL tablet 0.4 mg 0.4 mg Every 5 Minutes PRN 3/28/2019     Sig - Route: Place 1 tablet under the tongue Every 5 (Five) Minutes As Needed for Chest Pain (Chest Pain With Systolic Blood Pressure Greater Than 100). - Sublingual    Cosign for Ordering: Accepted by Lana Pearson MD on 3/29/2019 10:09 AM    ondansetron (ZOFRAN) injection 4 mg 4 mg Every 6 Hours PRN 3/28/2019     Sig - Route: Infuse 2 mL into a venous catheter Every 6 (Six) Hours As Needed for Nausea or Vomiting. - Intravenous    Linked Group 2:  \"Or\" Linked Group Details        ondansetron (ZOFRAN) tablet 4 mg 4 mg Every 6 Hours PRN 3/28/2019     Sig - Route: Take 1 tablet by mouth Every 6 (Six) Hours As Needed for Nausea or Vomiting. - Oral    Linked Group 2:  \"Or\" Linked Group Details        ondansetron ODT (ZOFRAN-ODT) disintegrating tablet 4 mg 4 mg Every 6 Hours PRN 3/28/2019     Sig - Route: Take 1 tablet by mouth Every 6 (Six) Hours As Needed for Nausea or Vomiting. - Oral    Linked Group 2:  \"Or\" Linked Group Details        sodium chloride 0.9 % flush 10 mL 10 mL As Needed 3/28/2019     Sig - Route: Infuse 10 mL into a venous catheter As Needed for Line Care. - Intravenous    Cosign for Ordering: Accepted by Adithya Guevara MD on 3/28/2019  5:56 AM    Linked Group 3:  \"And\" Linked Group Details        sodium chloride 0.9 % flush 3 mL 3 mL Every 12 Hours Scheduled 3/28/2019     Sig - " Route: Infuse 3 mL into a venous catheter Every 12 (Twelve) Hours. - Intravenous    sodium chloride 0.9 % flush 3-10 mL 3-10 mL As Needed 3/28/2019     Sig - Route: Infuse 3-10 mL into a venous catheter As Needed for Line Care. - Intravenous    sodium chloride 0.9 % infusion  - ADS Override Pull   4/1/2019 4/1/2019    Notes to Pharmacy: Created by cabinet override    cefTRIAXone (ROCEPHIN) 1 g/100 mL 0.9% NS (MBP) (Discontinued) 1 g Every 24 Hours 3/29/2019 4/2/2019    Sig - Route: Infuse 100 mL into a venous catheter Daily. - Intravenous    ipratropium-albuterol (DUO-NEB) nebulizer solution 3 mL (Discontinued) 3 mL Every 6 Hours PRN 3/28/2019 4/2/2019    Sig - Route: Take 3 mL by nebulization Every 6 (Six) Hours As Needed for Shortness of Air. - Nebulization            Lab Results (last 24 hours)     Procedure Component Value Units Date/Time    Comprehensive Metabolic Panel [[id number]]  (Abnormal) Collected:  04/01/19 2348    Specimen:  Blood Updated:  04/02/19 0018     Glucose 93 mg/dL      BUN 19 mg/dL      Creatinine 0.67 mg/dL      Sodium 137 mmol/L      Potassium 4.1 mmol/L      Chloride 100 mmol/L      CO2 27.3 mmol/L      Calcium 8.4 mg/dL      Total Protein 5.7 g/dL      Albumin 3.05 g/dL      ALT (SGPT) 6 U/L      AST (SGOT) 20 U/L      Alkaline Phosphatase 126 U/L      Total Bilirubin 1.1 mg/dL      eGFR Non African Amer 84 mL/min/1.73      Globulin 2.7 gm/dL      A/G Ratio 1.2 g/dL      BUN/Creatinine Ratio 28.4     Anion Gap 9.7 mmol/L     Narrative:       GFR Normal >60  Chronic Kidney Disease <60  Kidney Failure <15    CBC & Differential [[id number]] Collected:  04/01/19 2348    Specimen:  Blood Updated:  04/01/19 2354    Narrative:       The following orders were created for panel order CBC & Differential.  Procedure                               Abnormality         Status                     ---------                               -----------         ------                     CBC Auto  Differential[[id number]]        Abnormal            Final result                 Please view results for these tests on the individual orders.    CBC Auto Differential [[id number]]  (Abnormal) Collected:  04/01/19 2348    Specimen:  Blood Updated:  04/01/19 2354     WBC 5.51 10*3/mm3      RBC 3.22 10*6/mm3      Hemoglobin 9.1 g/dL      Hematocrit 28.9 %      MCV 89.8 fL      MCH 28.3 pg      MCHC 31.5 g/dL      RDW 15.1 %      RDW-SD 48.2 fl      MPV 8.6 fL      Platelets 235 10*3/mm3      Neutrophil % 69.7 %      Lymphocyte % 16.9 %      Monocyte % 11.8 %      Eosinophil % 0.9 %      Basophil % 0.2 %      Immature Grans % 0.5 %      Neutrophils, Absolute 3.84 10*3/mm3      Lymphocytes, Absolute 0.93 10*3/mm3      Monocytes, Absolute 0.65 10*3/mm3      Eosinophils, Absolute 0.05 10*3/mm3      Basophils, Absolute 0.01 10*3/mm3      Immature Grans, Absolute 0.03 10*3/mm3     Blood Culture - Blood, Arm, Left [[id number]] Collected:  03/28/19 1240    Specimen:  Blood from Arm, Left Updated:  04/01/19 1300     Blood Culture No growth at 4 days        Imaging Results (last 24 hours)     ** No results found for the last 24 hours. **        ECG/EMG Results (last 24 hours)     ** No results found for the last 24 hours. **           Physician Progress Notes (last 24 hours) (Notes from 4/1/2019 11:28 AM through 4/2/2019 11:28 AM)      Kosta Brown DO at 4/1/2019  7:30 PM          Subjective     History:   Mary Lou Graves is a 83 y.o. female admitted on 3/28/2019 secondary to Closed displaced intertrochanteric fracture of right femur (CMS/[facility])     Procedures:   3/38/29: Right hip intertrochanteric nailing    Transfusions:  3/29/19: 1 unit of PRBC's     CC: Follow up hip fracture    Patient seen and examined with LEYLA Teixeira. Awake and alert. Pleasantly confused and unable to provide any history. Sitter present at bedside. Pt denies any complaints at present. RN reports pt c/o CP earlier this AM. No acute events overnight  per RN.     History taken from: chart, and RN.      Objective     Vital Signs  Temp:  [96.8 °F (36 °C)-98.4 °F (36.9 °C)] 98.4 °F (36.9 °C)  Heart Rate:  [70-90] 90  Resp:  [18-20] 20  BP: ()/(49-81) 147/81    Intake/Output Summary (Last 24 hours) at 4/1/2019 1930  Last data filed at 4/1/2019 1921  Gross per 24 hour   Intake 300 ml   Output 550 ml   Net -250 ml         Physical Exam:  General:    Awake, alert but confused, in no acute distress, chronically ill appearing    Heart:      Normal S1 and S2. Regular rate and rhythm. No significant murmur, rubs or gallops appreciated.   Lungs:     Respirations regular, even and unlabored. Lungs clear to auscultation B/L. No wheezes, rales or rhonchi.   Abdomen:   Soft and nontender. No guarding, rebound tenderness or  organomegaly noted. Bowel sounds present x 4.   Extremities:  No clubbing, cyanosis or edema noted. Moves UE and LE equally B/L. Right hip dressing appears c/d/i.      Results Review:    Results from last 7 days   Lab Units 04/01/19  0517 03/31/19  0205 03/30/19  0151 03/29/19  1722 03/29/19  1507 03/29/19  0253 03/28/19  0435   WBC 10*3/mm3 5.83 6.18 6.99 7.16  --  9.10 8.53   HEMOGLOBIN g/dL 7.0* 7.0* 7.4* 7.4* 8.0* 6.3* 8.3*   PLATELETS 10*3/mm3 226 212 212 232  --  260 301     Results from last 7 days   Lab Units 04/01/19  0517 03/31/19  0205 03/30/19  0151 03/29/19  0228 03/28/19  0435   SODIUM mmol/L 137 137 137 139 135*   POTASSIUM mmol/L 4.2 3.6 4.5 5.2 3.8   CHLORIDE mmol/L 102 100 100 102 99   CO2 mmol/L 25.9 27.3 26.4 26.1 26.5   BUN mg/dL 21 20 24* 24* 15   CREATININE mg/dL 0.64 0.73 0.92 1.06* 0.68   CALCIUM mg/dL 7.9* 7.8* 8.1* 8.0* 8.2*   GLUCOSE mg/dL 100* 94 105* 122* 170*     Results from last 7 days   Lab Units 03/29/19  0228 03/28/19  0435   BILIRUBIN mg/dL 0.2 0.3   ALK PHOS U/L 104 115   AST (SGOT) U/L 17 15   ALT (SGPT) U/L 14 10     Results from last 7 days   Lab Units 03/29/19  0228   MAGNESIUM mg/dL 1.9     Results from last 7  days   Lab Units 03/29/19  0228   INR  1.24*           Imaging Results (last 24 hours)     ** No results found for the last 24 hours. **            Medications:    carBAMazepine 200 mg Oral BID   carboxymethylcellulose 1 drop Both Eyes BID   ceftriaxone 1 g Intravenous Q24H   cholecalciferol 50,000 Units Oral Weekly   cyanocobalamin 1,000 mcg Intramuscular Weekly   enoxaparin 30 mg Subcutaneous Daily   famotidine 20 mg Oral Daily   ferrous sulfate 325 mg Oral Daily With Breakfast   lactobacillus acidophilus 1 capsule Oral Daily   levothyroxine 25 mcg Oral Q AM   mirtazapine 15 mg Oral Nightly   multivitamin 1 tablet Oral Daily   sodium chloride 3 mL Intravenous Q12H              Assessment/Plan   Right trochanteric hip fracture: S/P ORIF. Cont post-op management per ortho. PT/OT as tolerated. Ortho input appreciated.     UTI: Culture not obtained. Currently on Rocephin empirically.     Acute on chronic diastolic CHF: Improved with IV Lasix and appears euvolemic at present. Cont to monitor.     Acute on chronic normocytic anemia with component of acute blood loss: S/P 1 unit of PRBC's earlier this admission. Hgb remains at 7 with reported CP this AM and intermittent borderline hypotension. Will order additional unit of PRBC's today. Cont to monitor.     Dementia: Cont supportive treatment.    DVT PPX: SQ Lovenox    Discussed with Dr. Burris.     Disposition: Anticipate back to NH soon.       Kosta Brown DO  04/01/19  7:30 PM    Electronically signed by Kosta Brown DO at 4/1/2019  7:42 PM       Medical Student Notes (last 24 hours) (Notes from 4/1/2019 11:28 AM through 4/2/2019 11:28 AM)     No notes of this type exist for this encounter.        Consult Notes (last 24 hours) (Notes from 4/1/2019 11:28 AM through 4/2/2019 11:28 AM)     No notes of this type exist for this encounter.           Nutrition Notes (last 24 hours) (Notes from 4/1/2019 11:28 AM through 4/2/2019 11:28 AM)       Beata Flores RD at 4/1/2019  2:54 PM        Nutrition Services    Patient Name:  Mary Lou Graves  YOB: 1935  MRN: [id number]  Admit Date:  3/28/2019    Recommend add MVI daily.     Thank you    Electronically signed by:  Beata Flores RD  04/01/19 2:54 PM     Electronically signed by Beata Flores RD at 4/1/2019  2:55 PM          Physical Therapy Notes (last 24 hours) (Notes from 4/1/2019 11:28 AM through 4/2/2019 11:28 AM)      Alisa Gutierrez, PT at 4/1/2019  3:25 PM  Version 1 of 1 04/01/19 1524   Rehab Treatment   Discipline physical therapist   Reason Treatment Not Performed unavailable for treatment  (Pt unable to participate with PT. She was very confused and could not focus on tasks)       Electronically signed by Alisa Gutierrez PT at 4/1/2019  3:25 PM       Occupational Therapy Notes (last 24 hours) (Notes from 4/1/2019 11:28 AM through 4/2/2019 11:28 AM)     No notes of this type exist for this encounter.        Speech Language Pathology Notes (last 24 hours) (Notes from 4/1/2019 11:28 AM through 4/2/2019 11:28 AM)     No notes of this type exist for this encounter.        Respiratory Therapy Notes (last 24 hours) (Notes from 4/1/2019 11:28 AM through 4/2/2019 11:28 AM)     No notes of this type exist for this encounter.        Mary Lou Graves MRN: [id number]      Broken leg     3/28/2019 - 4/2/2019     [address]    After Visit Summary   Instructions     ·   Your medications have changed     START taking:   ? acetaminophen (TYLENOL)   ? cefdinir (OMNICEF)   Start taking on: 4/3/2019   ? HYDROcodone-acetaminophen (NORCO)   ? lactobacillus acidophilus   Start taking on: 4/3/2019   ? multivitamin   Start taking on: 4/3/2019   CHANGE how you take:   ? nicotine (NICODERM CQ)   STOP taking:   ? amLODIPine 5 MG tablet (NORVASC)   ? ibuprofen 400 MG tablet (ADVIL,MOTRIN)   Review your updated medication list below.   Your Next Steps     Ask     ·   Ask  how to get these medications   ? acetaminophen   ? lactobacillus acidophilus   ? multivitamin   Do     ·    these medications from any pharmacy with your printed prescription   ? cefdinir   ? HYDROcodone-acetaminophen   If you have any questions about your recovery, please call the [facility] Nurse Call Center at [phone]. A registered nurse is available 24 hours a day 7 days a week to assist you.   ·   Other instructions     Discharge Follow-up with PCP   Currently Documented PCP:   London Merritt MD   PCP Phone Number:   [phone]   Discharge Follow-up with PCP   Currently Documented PCP:   London Merritt MD   PCP Phone Number:   [phone]   Discharge Follow-up with Specified Provider: Follow up with Dr. Burris in 10-14 days.   What's next     What's next          Follow up with London Merritt MD   Follow up with Dr. London Merritt at the NH.  [address] [place] [address]   [phone]          Follow up with London Merritt MD   Follow up with Dr. Christina at the NH.  [address] [place] [address]   [phone]    Your Allergies   Date Reviewed: 3/28/2019   Your Allergies   No active allergies   Patient Belongings Returned     Document Return of Belongings Flowsheet     Were the patient bedside belongings sent home? --   Medications Retrieved from Pharmacy & Sent Home --   Belongings Sent to Safe --   Belongings sent with: --   Belongings Retrieved from Security & Sent Home --       NanoSteelhart Signup     [facility] [employer] allows you to send messages to your doctor, view your test results, renew your prescriptions, schedule appointments, and more. To sign up, go to [employer] and click on the Sign Up Now link in the New User? box. Enter your VelociData Activation Code exactly as it appears below along with the last four digits of your Social Security Number and your Date of Birth () to complete the sign-up process. If you do not sign up before the expiration  date, you must request a new code.     PureSignCo Activation Code: [email or web address]  Expires: 4/3/2019  6:31 AM     If you have questions, you can email [email or web address] or call [phone] to talk to our Pure[employer] staff. Remember, PureSignCo is NOT to be used for urgent needs. For medical emergencies, dial 911.     Medication List   Medication List     Morning Afternoon Evening Bedtime As Needed    acetaminophen 325 MG tablet   Commonly known as: TYLENOL   Take 2 tablets by mouth Every 4 (Four) Hours As Needed for Mild Pain .          aspirin 81 MG chewable tablet   Chew 81 mg Daily.          carBAMazepine 200 MG tablet   Commonly known as: TEGretol   Take 200 mg by mouth 2 (Two) Times a Day.          cefdinir 300 MG capsule   Commonly known as: OMNICEF   Start taking on: 4/3/2019   Take 1 capsule by mouth Every 12 (Twelve) Hours for 4 doses.   For: Urinary Tract Infection          cyanocobalamin 1000 MCG/ML injection   Inject 1,000 mcg into the appropriate muscle as directed by prescriber 1 (One) Time Per Week.          dabigatran etexilate 150 MG capsu   Commonly known as: PRADAXA   Take 150 mg by mouth 2 (Two) Times a Day.          ferrous sulfate 325 (65 FE) MG tablet   Take 325 mg by mouth Daily With Breakfast.          HYDROcodone-acetaminophen 5-325 MG per tablet   Commonly known as: NORCO   Take 1 tablet by mouth Every 4 (Four) Hours As Needed for Moderate Pain for up to 5 days.          lactobacillus acidophilus capsule capsule   Start taking on: 4/3/2019   Take 1 capsule by mouth Daily for 2 days.          levothyroxine 25 MCG tablet   Commonly known as: SYNTHROID, LEVOTHROID   Take 25 mcg by mouth Daily.          mirtazapine 15 MG tablet   Commonly known as: REMERON   Take 15 mg by mouth Every Night.          multivitamin tablet tablet   Start taking on: 4/3/2019   Take 1 tablet by mouth Daily.          nicotine 7 MG/24HR patch   Commonly known as: NICODERM CQ   Place 1 patch on the skin as  "directed by provider Daily.   What changed: Another medication with the same name was removed. Continue taking this medication, and follow the directions you see here.          polyethyl glycol-propyl glycol 0.4-0.3 % solution ophthalmic solution   Commonly known as: SYSTANE   Administer 1 drop to both eyes 2 (Two) Times a Day.          vitamin D 02535 units capsule capsule   Commonly known as: ERGOCALCIFEROL   Take 50,000 Units by mouth 1 (One) Time Per Week.         Where to  your medications     ·    these medications from any pharmacy with your printed prescription     ? cefdinir • HYDROcodone-acetaminophen   ·   Ask your doctor where to  these medications     ? • acetaminophen 325 MG tablet   ? • lactobacillus acidophilus capsule capsule   ? • multivitamin tablet tablet   Your Medication List      Always carry an updated list of your medications with you. If there is an emergency, a responder can quickly see what medications you are taking. Take this paperwork with you the next time you see your health care provider.        [employer] Sign-Up     Send messages to your doctor, view your test results, renew your prescriptions, schedule appointments, and more.   Go to [email or web address], click \"Sign Up Now\", and enter your personal activation code: [email or web address]. Activation code expires 4/3/2019.   PREVENTING SURGICAL SITE INFECTIONS     Surgical Site Infections FAQs  What is a Surgical Site Infection (SSI)?  A surgical site infection is an infection that occurs after surgery in the part of the body where the surgery took place. Most patients who have surgery do not develop an infection. However, infections develop in about 1 to 3 out of every 100 patients who have surgery.  Some of the common symptoms of a surgical site infection are:  · Redness and pain around the area where you had surgery  · Drainage of cloudy fluid from your surgical wound  · Fever     Can " SSIs be treated?  Yes. Most surgical site infections can be treated with antibiotics. The antibiotic given to you depends on the bacteria (germs) causing the infection. Sometimes patients with SSIs also need another surgery to treat the infection.  What are some of the things that hospitals are doing to prevent SSIs?  To prevent SSIs, doctors, nurses, and other healthcare providers:  · Clean their hands and arms up to their elbows with an antiseptic agent just before the surgery.  · Clean their hands with soap and water or an alcohol-based hand rub before and after caring for each patient.  · May remove some of your hair immediately before your surgery using electric clippers if the hair is in the same area where the procedure will occur. They should not shave you with a razor.  · Wear special hair covers, masks, gowns, and gloves during surgery to keep the surgery area clean.  · Give you antibiotics before your surgery starts. In most cases, you should get antibiotics within 60 minutes before the surgery starts and the antibiotics should be stopped within 24 hours after surgery.  · Clean the skin at the site of your surgery with a special soap that kills germs.     What can I do to help prevent SSIs?Before your surgery:  · Tell your doctor about other medical problems you may have. Health problems such as allergies, diabetes, and obesity could affect your surgery and your treatment.  · Quit smoking. Patients who smoke get more infections. Talk to your doctor about how you can quit before your surgery.  · Do not shave near where you will have surgery. Shaving with a razor can irritate your skin and make it easier to develop an infection.  At the time of your surgery:  · Speak up if someone tries to shave you with a razor before surgery. Ask why you need to be shaved and talk with your surgeon if you have any concerns.  · Ask if you will get antibiotics before surgery.  After your surgery:  · Make sure that your  healthcare providers clean their hands before examining you, either with soap and water or an alcohol-based hand rub.  ? If you do not see your providers clean their hands, please ask them to do so.  · Family and friends who visit you should not touch the surgical wound or dressings.  · Family and friends should clean their hands with soap and water or an alcohol-based hand rub before and after visiting you. If you do not see them clean their hands, ask them to clean their hands.  What do I need to do when I go home from the hospital?  · Before you go home, your doctor or nurse should explain everything you need to know about taking care of your wound. Make sure you understand how to care for your wound before you leave the hospital.  · Always clean your hands before and after caring for your wound.  · Before you go home, make sure you know who to contact if you have questions or problems after you get home.  · If you have any symptoms of an infection, such as redness and pain at the surgery site, drainage, or fever, call your doctor immediately.  If you have additional questions, please ask your doctor or nurse.  Developed and co-sponsored by The Society for Healthcare Epidemiology of [place] (SHEA); Infectious Diseases Society of [place] (IDSA); American Hospital Association; Association for Professionals in Infection Control and Epidemiology (APIC); Centers for Disease Control and Prevention (CDC); and The Joint Commission.  This information is not intended to replace advice given to you by your health care provider. Make sure you discuss any questions you have with your health care provider.  Document Released: 12/23/2014 Document Revised: 11/15/2017 Document Reviewed: 05/08/2017  [employer] Interactive Patient Education © 2019 [employer]     Pneumonia Vaccination     Please follow up with your primary care provider or retail pharmacy to see if you are eligible for a pneumonia vaccination.        Opioid  Resource     If you or someone you know needs information on substance abuse, please visit   https://www.findhelpnowky.org/ for listings of facilities and resources across [place].           SYMPTOMS OF A STROKE     Call 911 or have someone take you to the Emergency Department if you have any of the following:     · Sudden numbness or weakness of your face, arm or leg especially on one side of the body  · Sudden confusion, difficulty speaking or trouble understanding   · Changes in your vision or loss of sight in one eye  · Sudden severe headache with no known cause  · Sudden dizziness, trouble walking, loss of balance or coordination     It is important to seek emergency care right away if you have further stroke symptoms. If you get emergency help quickly, the powerful clot-dissolving medicines can reduce the disabilities caused by a stroke.      For more information:     American Stroke Association  2-410-5-STROKE  www.strokeassociation.org      IF YOU SMOKE OR USE TOBACCO PLEASE READ THE FOLLOWING:     Why is smoking bad for me?  Smoking increases the risk of heart disease, lung disease, vascular disease, stroke, and cancer.      If you smoke, STOP!     If you would like more information on quitting smoking, please visit the [employer] Elements website: www.[employer] SoftBase/SmartSignal/healthier-together/smoke   This link will provide additional resources including the QUIT line and the Beat the Pack support groups.      For more information:     Quit Now [place]  1-800-QUIT-NOW  https://[place].quitlogix.org/en-US/         SUICIDAL FEELINGS     If you feel like life is too tough and are thinking of suicide or injuring yourself, get help right away!  · Call 911  · Call a suicide hotline to speak to a counselor. 2-371-727-TALK or 1-393-UOYFRDZ             YOU ARE THE MOST IMPORTANT FACTOR IN YOUR RECOVERY.      Follow all instructions carefully.      I have reviewed my discharge instructions with my nurse,  including the following information, if applicable:                 Information about my illness and diagnosis              Follow up appointments (including lab draws)              Wound Care              Equipment Needs              Medications (new and continuing) along with side effects              Preventative information such as vaccines and smoking cessations              Diet              Pain              I know when to contact my Doctor's office or seek emergency care        I want my nurse to describe the side effects of my medications: YES NO   If the answer is no, I understand the side effects of my medications: YES NO   My nurse described the side effects of my medications in a way that I could understand: YES NO   I have taken my personal belongings and my own medications with me at discharge: YES NO       I have received this information and my questions have been answered. I have discussed any concerns I see with this plan with the nurse or physician. I understand these instructions.     Signature of Patient or Responsible Person: _____________________________________     Date: _________________  Time: __________________     Signature of Healthcare Provider: _______________________________________  Date: _________________  Time: __________________            Discharge Summary     No notes of this type exist for this encounter.        Discharge Order (From admission, onward)    Start     Ordered    04/02/19 1117  Discharge patient  Once     Expected Discharge Date:  04/02/19    Discharge Disposition:  Skilled Nursing Facility (DC - External)    Physician of Record for Attribution - Please select from Treatment Team:  STEFANO KITCHEN [[id number]]    Review needed by CMO to determine Physician of Record:  No       Question Answer Comment   Physician of Record for Attribution - Please select from Treatment Team STEFANO KITCHEN    Review needed by CMO to determine Physician of Record No         04/02/19 1125

## 2019-04-02 NOTE — DISCHARGE SUMMARY
Date of Admission: 3/28/2019    Date of Discharge: 4/2/2019     PCP: London Merritt MD    Admission Diagnosis:   Please see admission H&P    Discharge Diagnosis:   Right trochanteric hip fracture  UTI  Acute on chronic diastolic CHF  Acute on chronic normocytic anemia with component of acute blood loss  Dementia    Procedures Performed:    3/28/29: Right hip intertrochanteric nailing     Transfusions:  3/29/19: 1 unit of PRBC's   4/1/19: 1 unit of PRBC's     Consults:   Consults     Date and Time Order Name Status Description    3/28/2019 0756 Inpatient Orthopedic Surgery Consult Completed     3/28/2019 0611 Ortho (on-call MD unless specified) Completed             History of Present Illness:  Mary Lou Graves is a 83 y.o. female who presented to [facility] ED with CC of right hip pain s/p a fall. Please see admission H&P for complete details.     In the ED, imaging revealed a right femoral intertrochanteric fracture. Routine labs revealed anemia with workup also revealing possible UTI. Cultures were obtained and she was given a dose of Rocephin.         Hospital Course  Mary Lou Graves was admitted to the telemetry floor for further evaluation and treatment. She was continued on IV Rocephin. Her home medications were reviewed and both ASA and Pradaxa were held. Orthopedic surgery was consulted for further evaluation.     She was taken to the OR on 3/28/19 where she underwent an ORIF with right hip intertrochanteric nailing. She tolerated the procedure well and returned to the telemetry floor. She developed acute on chronic anemia with acute blood loss requiring 1 unit of PRBC's on 3/29/19 and an additional unit on 4/1/19. She remained confused but overall clinically stable. She developed some mild volume overload but this improved with IV Lasix.     Ms. Graves was seen and examined with LEYLA Barron. She remained hemodynamically stable with stable AM labs. She was clinically stable and deemed medically stable for discharge  after discussion with orthopedic surgery. Anticoagulation was discussed and they recommended to resume her home ASA and Pradaxa but to not continue the Lovenox initially prescribed postoperatively. She was transitioned to Omnicef to complete a course of treatment for her UTI. She will need follow up with Dr. Christina at the NH as well as orthopedic surgery in 10-14 days.      Condition on Discharge:  Stable    Vital Signs  Vitals:    04/02/19 0800   BP:    Pulse:    Resp:    Temp:    SpO2: 96%       Physical Exam:  General:    Awake, alert but confused, in no acute distress, chronically ill appearing    Heart:      Normal S1 and S2. Regular rate and rhythm. No significant murmur, rubs or gallops appreciated.   Lungs:     Respirations regular, even and unlabored. Lungs clear to auscultation B/L. No wheezes, rales or rhonchi.   Abdomen:   Soft and nontender. No guarding, rebound tenderness or  organomegaly noted. Bowel sounds present x 4.   Extremities:  No clubbing, cyanosis or edema noted. Moves UE and LE equally B/L. Right hip dressing appears c/d/i.      Discharge Disposition:   SNF      Discharge Medications:     Discharge Medications      New Medications      Instructions Start Date   acetaminophen 325 MG tablet  Commonly known as:  TYLENOL   650 mg, Oral, Every 4 Hours PRN      cefdinir 300 MG capsule  Commonly known as:  OMNICEF   300 mg, Oral, Every 12 Hours Scheduled   Start Date:  4/3/2019     HYDROcodone-acetaminophen 5-325 MG per tablet  Commonly known as:  NORCO   1 tablet, Oral, Every 4 Hours PRN      lactobacillus acidophilus capsule capsule   1 capsule, Oral, Daily   Start Date:  4/3/2019     multivitamin tablet tablet   1 tablet, Oral, Daily   Start Date:  4/3/2019        Changes to Medications      Instructions Start Date   nicotine 7 MG/24HR patch  Commonly known as:  NICODERM CQ  What changed:  Another medication with the same name was removed. Continue taking this medication, and follow the  directions you see here.   1 patch, Transdermal, Every 24 Hours         Continue These Medications      Instructions Start Date   aspirin 81 MG chewable tablet   81 mg, Oral, Daily      carBAMazepine 200 MG tablet  Commonly known as:  TEGretol   200 mg, Oral, 2 Times Daily      cyanocobalamin 1000 MCG/ML injection   1,000 mcg, Intramuscular, Weekly      dabigatran etexilate 150 MG capsu  Commonly known as:  PRADAXA   150 mg, Oral, 2 Times Daily      ferrous sulfate 325 (65 FE) MG tablet   325 mg, Oral, Daily With Breakfast      levothyroxine 25 MCG tablet  Commonly known as:  SYNTHROID, LEVOTHROID   25 mcg, Oral, Daily      mirtazapine 15 MG tablet  Commonly known as:  REMERON   15 mg, Oral, Nightly      polyethyl glycol-propyl glycol 0.4-0.3 % solution ophthalmic solution  Commonly known as:  SYSTANE   1 drop, Both Eyes, 2 Times Daily      vitamin D 43304 units capsule capsule  Commonly known as:  ERGOCALCIFEROL   50,000 Units, Oral, Weekly         Stop These Medications    amLODIPine 5 MG tablet  Commonly known as:  NORVASC     ibuprofen 400 MG tablet  Commonly known as:  ADVIL,MOTRIN              Discharge Diet:   Dietary Orders (From admission, onward)    Start     Ordered    04/01/19 1800  Dietary Nutrition Supplements Magic Cup  Daily With Lunch & Dinner     Question:  Select Supplement  Answer:  Magic Cup    04/01/19 1454    03/31/19 1200  Dietary Nutrition Supplements Boost Plus (Ensure Enlive, Ensure Plus)  Daily With Lunch & Dinner     Question:  Select Supplement  Answer:  Boost Plus (Ensure Enlive, Ensure Plus)    03/30/19 2238    03/29/19 1600  Diet Pureed; Thin  Diet Effective Now     Comments:  Meds whole in puree, crush PRN.   Question Answer Comment   Diet Texture / Consistency Pureed    Fluid Consistency Thin        03/29/19 1600          Activity at Discharge:  Activity per orthopedic surgery recommendations.     Follow-up Appointments:  Follow up with Dr. Christina at the NH.  Follow up with   [facility] in 10-14 days.         Test Results Pending at Discharge:  None     Kosta Brown DO  04/02/19  11:23 AM      Time: Greater than 30 minutes spent on this discharge.

## 2019-04-02 NOTE — PROGRESS NOTES
Discharge Planning Assessment  YAN Kate     Patient Name: Mary Lou Graves  MRN: [id number]  Today's Date: 4/2/2019    Admit Date: 3/28/2019    Discharge Needs Assessment    No  Discharge Plan     Row Name 04/02/19 1133       Plan    Final Discharge Disposition Code  03 - skilled nursing facility (SNF)    Final Note  Pt is being discharged back to [facility] on this date. SS [facility] on this date who states they are agreeable to accept pt back on this date. SS faxed pt's information on this date. SS spoke with pt's son on this date to inform of discharge. No further needs at this time.           Olena Oneil

## 2019-04-03 ENCOUNTER — EPISODE CHANGES (OUTPATIENT)
Dept: CASE MANAGEMENT | Facility: OTHER | Age: 84
End: 2019-04-03

## 2019-04-03 ENCOUNTER — PATIENT OUTREACH (OUTPATIENT)
Dept: CASE MANAGEMENT | Facility: OTHER | Age: 84
End: 2019-04-03

## 2019-04-03 NOTE — OUTREACH NOTE
Care Coordination Review:  Patient with admission at [facility] 03/28-04/02, 2019 after suffering femur fracture while resident of nursing facility.  Patient previously private pay at [facility].  Discharge placement back at [facility].   HRCM outreach scheduled to determine payor and residency status following this event.

## 2019-04-04 ENCOUNTER — PATIENT OUTREACH (OUTPATIENT)
Dept: CASE MANAGEMENT | Facility: OTHER | Age: 84
End: 2019-04-04

## 2019-04-04 NOTE — OUTREACH NOTE
Skilled Nursing Facility Discharge Flowsheet:     Skilled Nursing Facility Discharge Assessment 4/4/2019   Acute Facility Discharged From [facility]   Acute Discharge Date 4/2/2019   Name of the Skilled Nursing Facility? [facility]   Tier Level of the Skilled Nursing Facility 2   Purpose of SNF Admission PT;OT   Estimated length of stay for the patient? LTC anticipated   Who is the insurance provider or payor of patient stay? Medicare   Progression of Patient? Reestablished care following acute event.

## 2019-04-17 DIAGNOSIS — Z98.890 S/P ORIF (OPEN REDUCTION INTERNAL FIXATION) FRACTURE: ICD-10-CM

## 2019-04-17 DIAGNOSIS — Z87.81 S/P ORIF (OPEN REDUCTION INTERNAL FIXATION) FRACTURE: ICD-10-CM

## 2019-04-17 DIAGNOSIS — S72.141D CLOSED DISPLACED INTERTROCHANTERIC FRACTURE OF RIGHT FEMUR WITH ROUTINE HEALING, SUBSEQUENT ENCOUNTER: Primary | ICD-10-CM

## 2019-04-18 ENCOUNTER — OFFICE VISIT (OUTPATIENT)
Dept: ORTHOPEDIC SURGERY | Facility: CLINIC | Age: 84
End: 2019-04-18

## 2019-04-18 ENCOUNTER — HOSPITAL ENCOUNTER (OUTPATIENT)
Dept: GENERAL RADIOLOGY | Facility: HOSPITAL | Age: 84
Discharge: HOME | End: 2019-04-18
Admitting: ORTHOPAEDIC SURGERY

## 2019-04-18 VITALS — WEIGHT: 92.37 LBS | BODY MASS INDEX: 12.51 KG/M2 | HEIGHT: 72 IN

## 2019-04-18 DIAGNOSIS — S72.141D CLOSED DISPLACED INTERTROCHANTERIC FRACTURE OF RIGHT FEMUR WITH ROUTINE HEALING, SUBSEQUENT ENCOUNTER: ICD-10-CM

## 2019-04-18 DIAGNOSIS — Z98.890 S/P ORIF (OPEN REDUCTION INTERNAL FIXATION) FRACTURE: ICD-10-CM

## 2019-04-18 DIAGNOSIS — Z87.81 S/P ORIF (OPEN REDUCTION INTERNAL FIXATION) FRACTURE: ICD-10-CM

## 2019-04-18 DIAGNOSIS — Z87.81 S/P ORIF (OPEN REDUCTION INTERNAL FIXATION) FRACTURE: Primary | ICD-10-CM

## 2019-04-18 DIAGNOSIS — Z98.890 S/P ORIF (OPEN REDUCTION INTERNAL FIXATION) FRACTURE: Primary | ICD-10-CM

## 2019-04-18 PROCEDURE — 73502 X-RAY EXAM HIP UNI 2-3 VIEWS: CPT | Performed by: RADIOLOGY

## 2019-04-18 PROCEDURE — 73502 X-RAY EXAM HIP UNI 2-3 VIEWS: CPT

## 2019-04-18 PROCEDURE — 99024 POSTOP FOLLOW-UP VISIT: CPT | Performed by: ORTHOPAEDIC SURGERY

## 2019-04-18 NOTE — PROGRESS NOTES
"Patient: Mary Lou Graves    YOB: 1935    Chief Complaint   Patient presents with   • Right Hip - Post-op, Follow-up         History of Present Illness: Patient is 43 weeks status post open reduction internal fixation right hip comminuted fracture with nail.  She does have significant baseline dementia.  She is here with ambulance personnel from the nursing home.  Unable to give history    Past Medical History:   Diagnosis Date   • Arthritis    • Dementia    • Depression    • Disease of thyroid gland    • Hyperlipidemia    • Hypertension         Social History     Socioeconomic History   • Marital status:      Spouse name: Not on file   • Number of children: Not on file   • Years of education: Not on file   • Highest education level: Not on file   Tobacco Use   • Smoking status: Current Every Day Smoker     Packs/day: 0.50   • Smokeless tobacco: Never Used   Substance and Sexual Activity   • Alcohol use: No     Frequency: Never     Comment: denies   • Drug use: No     Comment: denies   • Sexual activity: No           Physical Exam: 83 y.o. female  General Appearance:    Alert and oriented x 3, cooperative, in no acute distress                   Vitals:    04/18/19 1429   Weight: 41.9 kg (92 lb 6 oz)   Height: 185.4 cm (72.99\")          Lying on stretcher does not appear to be in acute distress.  Responsive to questioning but not oriented to time or place.  Wounds are clean dry and intact with 2 stitches left.  Reasonable passive range of motion of her hip discomfort.  No lower extremity swelling is noted.      Radiology:       X-ray shows no significant change in alignment of the intertrochanteric fracture fixation      Assessment/Plan: Healing fracture status post gamma nail fixation.  Patient can begin gentle progressive weightbearing as he tolerates although due to her dementia status, sure, she ambulates to begin with.  Stitches were removed today.  We will see her more time in 6 weeks for " follow-up x-ray check            Discussion/Summary:                This chart was completed utilizing the dragon speech recognition software.  Grammatical errors, random word insertions, pronoun errors, and incomplete sentences or occasional consequences of the system due to software limitations, ambient noise, and hardware issues.  Any questions or concerns about the content, text, or information contained within the body of this dictation should be directly addressed to the physician for clarification        This document was signed by Sebas Burris M.D. April 18, 2019 2:57 PM

## 2019-05-13 ENCOUNTER — HOSPITAL ENCOUNTER (EMERGENCY)
Facility: HOSPITAL | Age: 84
Discharge: HOME | End: 2019-05-13
Attending: EMERGENCY MEDICINE | Admitting: EMERGENCY MEDICINE

## 2019-05-13 ENCOUNTER — APPOINTMENT (OUTPATIENT)
Dept: GENERAL RADIOLOGY | Facility: HOSPITAL | Age: 84
End: 2019-05-13

## 2019-05-13 VITALS
HEART RATE: 73 BPM | HEIGHT: 72 IN | TEMPERATURE: 98.3 F | WEIGHT: 95 LBS | SYSTOLIC BLOOD PRESSURE: 140 MMHG | RESPIRATION RATE: 20 BRPM | BODY MASS INDEX: 12.87 KG/M2 | OXYGEN SATURATION: 95 % | DIASTOLIC BLOOD PRESSURE: 90 MMHG

## 2019-05-13 DIAGNOSIS — S62.102A LEFT WRIST FRACTURE, CLOSED, INITIAL ENCOUNTER: Primary | ICD-10-CM

## 2019-05-13 DIAGNOSIS — S52.502A CLOSED FRACTURE DISTAL RADIUS AND ULNA, LEFT, INITIAL ENCOUNTER: ICD-10-CM

## 2019-05-13 DIAGNOSIS — S52.602A CLOSED FRACTURE DISTAL RADIUS AND ULNA, LEFT, INITIAL ENCOUNTER: ICD-10-CM

## 2019-05-13 PROCEDURE — 99283 EMERGENCY DEPT VISIT LOW MDM: CPT

## 2019-05-13 PROCEDURE — 73110 X-RAY EXAM OF WRIST: CPT | Performed by: RADIOLOGY

## 2019-05-13 PROCEDURE — 73110 X-RAY EXAM OF WRIST: CPT

## 2019-05-13 RX ORDER — HYDROCODONE BITARTRATE AND ACETAMINOPHEN 5; 325 MG/1; MG/1
1 TABLET ORAL ONCE
Status: COMPLETED | OUTPATIENT
Start: 2019-05-13 | End: 2019-05-13

## 2019-05-13 RX ADMIN — HYDROCODONE BITARTRATE AND ACETAMINOPHEN 1 TABLET: 5; 325 TABLET ORAL at 20:50

## 2019-05-13 NOTE — ED PROVIDER NOTES
Subjective   This is a 83-year-old female who comes in with chief complaint left wrist injury x1 day ago.  Patient states that she fell yesterday at the nursing home.  Denies any head injury or loss of conscious.        History provided by:  Patient and nursing home   used: No    Fall   Mechanism of injury: fall    Injury location:  Shoulder/arm  Shoulder/arm injury location:  L wrist  Incident location:  Home  Time since incident:  1 day  Arrived directly from scene: no    Fall:     Fall occurred:  Standing and tripped    Impact surface:  Athletic surface    Entrapped after fall: no    Suspicion of alcohol use: no    Suspicion of drug use: no    Tetanus status:  Unknown  Prior to arrival data:     Bystander interventions:  None    Patient ambulatory at scene: no      Blood loss:  None    Orientation at scene:  Person, situation, place and time    Loss of consciousness: no      Amnesic to event: no      Airway interventions:  None    Breathing interventions:  None    IV access status:  None    IO access:  None    Fluids administered:  None    Cardiac interventions:  None    Medications administered:  None    Immobilization:  None  Associated symptoms: no abdominal pain, no back pain, no blindness, no difficulty breathing, no headaches, no hearing loss, no nausea, no neck pain and no vomiting    Risk factors: no AICD, no anticoagulation therapy, no asthma, no beta blocker therapy, no CHF, no COPD, no diabetes, no kidney disease, no pacemaker, no past MI and not pregnant        Review of Systems   Constitutional: Negative.    HENT: Negative for hearing loss.    Eyes: Negative.  Negative for blindness, pain, discharge, redness and itching.   Respiratory: Negative.  Negative for apnea, choking and chest tightness.    Gastrointestinal: Negative.  Negative for abdominal pain, nausea and vomiting.   Genitourinary: Negative for difficulty urinating, dysuria and enuresis.   Musculoskeletal: Positive for  arthralgias, joint swelling and myalgias. Negative for back pain and neck pain.   Skin: Negative.  Negative for color change, pallor and rash.   Neurological: Negative for headaches.   Hematological: Negative.  Negative for adenopathy. Does not bruise/bleed easily.   Psychiatric/Behavioral: Negative.  Negative for agitation, decreased concentration and dysphoric mood.   All other systems reviewed and are negative.      Past Medical History:   Diagnosis Date   • Arthritis    • Dementia    • Depression    • Disease of thyroid gland    • Hyperlipidemia    • Hypertension        No Known Allergies    Past Surgical History:   Procedure Laterality Date   • HIP INTERTROCHANTERIC NAILING Right 3/28/2019    Procedure: HIP INTERTROCHANTERIC NAILING;  Surgeon: Sebas Burris MD;  Location: [facility];  Service: Orthopedics   • HYSTERECTOMY     • PACEMAKER IMPLANTATION         No family history on file.    Social History     Socioeconomic History   • Marital status:      Spouse name: Not on file   • Number of children: Not on file   • Years of education: Not on file   • Highest education level: Not on file   Tobacco Use   • Smoking status: Current Every Day Smoker     Packs/day: 0.50   • Smokeless tobacco: Never Used   Substance and Sexual Activity   • Alcohol use: No     Frequency: Never     Comment: denies   • Drug use: No     Comment: denies   • Sexual activity: No           Objective   Physical Exam   Constitutional: She is oriented to person, place, and time. She appears well-developed and well-nourished. No distress.   HENT:   Head: Normocephalic and atraumatic.   Right Ear: External ear normal.   Left Ear: External ear normal.   Nose: Nose normal.   Mouth/Throat: Oropharynx is clear and moist. No oropharyngeal exudate.   Eyes: Conjunctivae and EOM are normal. Pupils are equal, round, and reactive to light. Right eye exhibits no discharge. Left eye exhibits no discharge. No scleral icterus.   Neck:  Normal range of motion. Neck supple. No JVD present. No tracheal deviation present. No thyromegaly present.   Cardiovascular: Normal rate, regular rhythm, normal heart sounds and intact distal pulses. Exam reveals no friction rub.   No murmur heard.  Pulmonary/Chest: Effort normal and breath sounds normal. No stridor. No respiratory distress. She has no wheezes. She has no rales. She exhibits no tenderness.   Abdominal: Soft. Bowel sounds are normal. She exhibits no distension and no mass. There is no tenderness. There is no rebound and no guarding.   Musculoskeletal: She exhibits edema, tenderness and deformity.        Left wrist: She exhibits decreased range of motion, tenderness, bony tenderness, swelling and effusion.   Neurological: She is alert and oriented to person, place, and time. She displays normal reflexes. No cranial nerve deficit or sensory deficit. She exhibits normal muscle tone. Coordination normal.   Skin: Skin is warm and dry. Capillary refill takes less than 2 seconds. No rash noted. She is not diaphoretic. No erythema. No pallor.   Psychiatric: She has a normal mood and affect. Her behavior is normal. Judgment and thought content normal.   Nursing note and vitals reviewed.      Splint - Cast - Strapping  Date/Time: 5/13/2019 8:17 PM  Performed by: Zoran Jacobsen PA-C  Authorized by: Roni Corrales MD     Consent:     Consent obtained:  Verbal    Consent given by:  Patient    Risks discussed:  Discoloration, swelling and pain    Alternatives discussed:  No treatment  Pre-procedure details:     Sensation:  Normal    Skin color:  Pink   Procedure details:     Laterality:  Left    Location:  Wrist    Wrist:  L wrist    Strapping: no      Cast type:  Short arm    Splint type:  Wrist    Supplies:  Ortho-Glass  Post-procedure details:     Pain:  Improved    Sensation:  Normal    Skin color:  Pink     Patient tolerance of procedure:  Tolerated well, no immediate complications  Comments:       N/V intact                ED Course                  MDM      Final diagnoses:   Left wrist fracture, closed, initial encounter   Closed fracture distal radius and ulna, left, initial encounter            Zoran Jacobsen PA-C  05/13/19 2023       Zoran Jacobsen PA-C  05/13/19 2023

## 2019-05-14 ENCOUNTER — OFFICE VISIT (OUTPATIENT)
Dept: ORTHOPEDIC SURGERY | Facility: CLINIC | Age: 84
End: 2019-05-14

## 2019-05-14 VITALS — WEIGHT: 95 LBS | HEIGHT: 72 IN | BODY MASS INDEX: 12.87 KG/M2

## 2019-05-14 DIAGNOSIS — S52.532A CLOSED COLLES' FRACTURE OF LEFT RADIUS, INITIAL ENCOUNTER: Primary | ICD-10-CM

## 2019-05-14 PROCEDURE — 25600 CLTX DST RDL FX/EPHYS SEP WO: CPT | Performed by: ORTHOPAEDIC SURGERY

## 2019-05-14 RX ORDER — HYDROCODONE BITARTRATE AND ACETAMINOPHEN 5; 325 MG/1; MG/1
1 TABLET ORAL EVERY 4 HOURS PRN
Status: ON HOLD | COMMUNITY
End: 2019-07-10 | Stop reason: SDUPTHER

## 2019-05-14 RX ORDER — BARRIER CREAM 200; 4.5 MG/G; MG/G
CREAM TOPICAL
COMMUNITY
End: 2019-06-27

## 2019-05-14 RX ORDER — POLYETHYLENE GLYCOL 3350 17 G/17G
17 POWDER, FOR SOLUTION ORAL DAILY PRN
COMMUNITY

## 2019-05-14 NOTE — ED NOTES
I called [facility], spoke with Eladio.  They currently are waiting to hear back from [facility] about 1 transfer from here.  He states it would be a while before they could accept.       Xena Yo  05/13/19 3060

## 2019-05-14 NOTE — ED NOTES
I called [facility] back for an ETA.  I spoke with Sushil, she states all the trucks are out on emergencies.  ETA will be about an hour, unless more emergencies arise.      Xena Yo  05/13/19 2127

## 2019-05-14 NOTE — PROGRESS NOTES
"Patient: Mary Lou Graves    YOB: 1935    Chief Complaint   Patient presents with   • Left Wrist - Pain, Edema         History of Present Illness: 83-year-old white female known to scratch this status post open reduction internal fixation of her right hip who apparently while at the nursing home suffered an injury to her left wrist.  Had x-rays elsewhere was placed in splint and presents here for definitive care.  Patient does have difficulty communicating.  She does not appear to be in acute distress    Past Medical History:   Diagnosis Date   • Arthritis    • Dementia    • Depression    • Disease of thyroid gland    • Hyperlipidemia    • Hypertension         Social History     Socioeconomic History   • Marital status:      Spouse name: Not on file   • Number of children: Not on file   • Years of education: Not on file   • Highest education level: Not on file   Tobacco Use   • Smoking status: Current Every Day Smoker     Packs/day: 0.50   • Smokeless tobacco: Never Used   Substance and Sexual Activity   • Alcohol use: No     Frequency: Never     Comment: denies   • Drug use: No     Comment: denies   • Sexual activity: No           Physical Exam: 83 y.o. female  General Appearance:    Alert and oriented x 3, cooperative, in no acute distress                   Vitals:    05/14/19 1410   Weight: 43.1 kg (95 lb)   Height: 185 cm (72.84\")          Exam of her left wrist shows she has some mild swelling but no obvious deformity but significant ecchymosis is noted throughout the forearm.  She is missing her second and third fingers.  Otherwise there is neurovascular intact      Radiology:       X-rays reviewed by myself show that she has a slightly impacted mildly dorsally angulated distal radius fracture      Assessment/Plan: Left distal radius fracture.  Today we placed her in a well molded synthetic short arm cast.  Elevate and encourage movement of the remaining fingers.  We will see her back in " 4 weeks for x-rays out of cast of her left wrist and also follow-up of her right hip.  It is noted that the patient takes Pradaxa normally.                    Discussion/Summary:                This chart was completed utilizing the dragon speech recognition software.  Grammatical errors, random word insertions, pronoun errors, and incomplete sentences or occasional consequences of the system due to software limitations, ambient noise, and hardware issues.  Any questions or concerns about the content, text, or information contained within the body of this dictation should be directly addressed to the physician for clarification        This document was signed by Sebas Burris M.D. May 14, 2019 2:54 PM

## 2019-05-14 NOTE — ED NOTES
I called [facility], about transfer back to the the nursing home.  They accepted transfer back and will page it out.  ETA should not be too long.       Xena Yo  05/13/19 2052

## 2019-05-16 ENCOUNTER — PATIENT OUTREACH (OUTPATIENT)
Dept: CASE MANAGEMENT | Facility: OTHER | Age: 84
End: 2019-05-16

## 2019-05-16 NOTE — OUTREACH NOTE
SNF Follow-up Note    Skilled Nursing Facility Discharge Assessment 5/16/2019   Acute Facility Discharged From [facility]   Acute Discharge Date 4/2/2019   Name of the Skilled Nursing Facility? [facility]   Tier Level of the Skilled Nursing Facility 2   Purpose of SNF Admission PT;OT   Estimated length of stay for the patient? LTC anticipated   Who is the insurance provider or payor of patient stay? Medicare   Progression of Patient? Spoke with Sophia, patient is still under Medicare, no dc date at this time     * Per Sophia, new  is Sasha.    Brianna Cuellar RN    5/16/2019, 1:20 PM

## 2019-05-21 ENCOUNTER — HOSPITAL ENCOUNTER (OUTPATIENT)
Dept: BONE DENSITY | Facility: HOSPITAL | Age: 84
Discharge: HOME | End: 2019-05-21
Admitting: INTERNAL MEDICINE

## 2019-05-21 DIAGNOSIS — M81.0 AGE-RELATED OSTEOPOROSIS WITHOUT CURRENT PATHOLOGICAL FRACTURE: ICD-10-CM

## 2019-05-21 PROCEDURE — 77080 DXA BONE DENSITY AXIAL: CPT | Performed by: RADIOLOGY

## 2019-05-21 PROCEDURE — 77080 DXA BONE DENSITY AXIAL: CPT

## 2019-05-23 ENCOUNTER — PATIENT OUTREACH (OUTPATIENT)
Dept: CASE MANAGEMENT | Facility: OTHER | Age: 84
End: 2019-05-23

## 2019-05-23 NOTE — OUTREACH NOTE
SNF Follow-up Note    Skilled Nursing Facility Discharge Assessment 5/23/2019   Acute Facility Discharged From [facility]   Acute Discharge Date 4/2/2019   Name of the Skilled Nursing Facility? [facility]   Tier Level of the Skilled Nursing Facility 2   Purpose of SNF Admission PT;OT   Estimated length of stay for the patient? LTC anticipated   Who is the insurance provider or payor of patient stay? Medicaid   Progression of Patient? Spoke with Sasha, patient switched to Medicaid 5/3/19   Skilled Nursing Discharge Date? 5/3/2019   Where was the patient discharged to? LTC       Brianna Cuellar, RN    5/23/2019, 2:42 PM

## 2019-05-30 ENCOUNTER — PATIENT OUTREACH (OUTPATIENT)
Dept: CASE MANAGEMENT | Facility: OTHER | Age: 84
End: 2019-05-30

## 2019-05-30 ENCOUNTER — EPISODE CHANGES (OUTPATIENT)
Dept: CASE MANAGEMENT | Facility: OTHER | Age: 84
End: 2019-05-30

## 2019-05-30 NOTE — OUTREACH NOTE
SNF Follow-up Note    Skilled Nursing Facility Discharge Assessment 5/30/2019   Acute Facility Discharged From [facility]   Acute Discharge Date 4/2/2019   Name of the Skilled Nursing Facility? [facility]   Tier Level of the Skilled Nursing Facility 2   Purpose of SNF Admission PT;OT   Estimated length of stay for the patient? LTC   Who is the insurance provider or payor of patient stay? Medicaid   Progression of Patient? Spoke with Sasha, patient switched to Medicaid 5/3/19   Skilled Nursing Discharge Date? 5/3/2019   Where was the patient discharged to? -       Brianna Cuellar, RN    5/30/2019, 1:43 PM

## 2019-06-10 DIAGNOSIS — S52.532A CLOSED COLLES' FRACTURE OF LEFT RADIUS, INITIAL ENCOUNTER: Primary | ICD-10-CM

## 2019-06-10 DIAGNOSIS — S72.141D CLOSED DISPLACED INTERTROCHANTERIC FRACTURE OF RIGHT FEMUR WITH ROUTINE HEALING, SUBSEQUENT ENCOUNTER: ICD-10-CM

## 2019-06-10 DIAGNOSIS — Z98.890 S/P ORIF (OPEN REDUCTION INTERNAL FIXATION) FRACTURE: ICD-10-CM

## 2019-06-10 DIAGNOSIS — Z87.81 S/P ORIF (OPEN REDUCTION INTERNAL FIXATION) FRACTURE: ICD-10-CM

## 2019-06-11 ENCOUNTER — OFFICE VISIT (OUTPATIENT)
Dept: ORTHOPEDIC SURGERY | Facility: CLINIC | Age: 84
End: 2019-06-11

## 2019-06-11 ENCOUNTER — HOSPITAL ENCOUNTER (OUTPATIENT)
Dept: GENERAL RADIOLOGY | Facility: HOSPITAL | Age: 84
Discharge: HOME | End: 2019-06-11
Admitting: ORTHOPAEDIC SURGERY

## 2019-06-11 VITALS — WEIGHT: 85 LBS | HEIGHT: 72 IN | BODY MASS INDEX: 11.51 KG/M2

## 2019-06-11 DIAGNOSIS — Z98.890 S/P ORIF (OPEN REDUCTION INTERNAL FIXATION) FRACTURE: ICD-10-CM

## 2019-06-11 DIAGNOSIS — S72.141D CLOSED DISPLACED INTERTROCHANTERIC FRACTURE OF RIGHT FEMUR WITH ROUTINE HEALING, SUBSEQUENT ENCOUNTER: ICD-10-CM

## 2019-06-11 DIAGNOSIS — Z87.81 S/P ORIF (OPEN REDUCTION INTERNAL FIXATION) FRACTURE: ICD-10-CM

## 2019-06-11 DIAGNOSIS — S52.532A CLOSED COLLES' FRACTURE OF LEFT RADIUS, INITIAL ENCOUNTER: ICD-10-CM

## 2019-06-11 DIAGNOSIS — S52.532D CLOSED COLLES' FRACTURE OF LEFT RADIUS WITH ROUTINE HEALING, SUBSEQUENT ENCOUNTER: Primary | ICD-10-CM

## 2019-06-11 PROCEDURE — 73110 X-RAY EXAM OF WRIST: CPT

## 2019-06-11 PROCEDURE — 73110 X-RAY EXAM OF WRIST: CPT | Performed by: RADIOLOGY

## 2019-06-11 PROCEDURE — 73502 X-RAY EXAM HIP UNI 2-3 VIEWS: CPT

## 2019-06-11 PROCEDURE — 73502 X-RAY EXAM HIP UNI 2-3 VIEWS: CPT | Performed by: RADIOLOGY

## 2019-06-11 PROCEDURE — 99024 POSTOP FOLLOW-UP VISIT: CPT | Performed by: ORTHOPAEDIC SURGERY

## 2019-06-11 NOTE — PROGRESS NOTES
"Mary Lou Graves   :1935    Date of encounter:2019    Chief Complaint   Patient presents with   • Right Hip - Fracture, Follow-up   • Left Wrist - Fracture, Follow-up       HPI:  Mary Lou Graves is a 83 y.o. female who returns here today status post ORIF of the right hip with gamma nail for treatment of intertrochanteric hip fracture done on 3/28/2019.  We are also following her for a left distal radius fracture sustained about a month ago in the nursing home.  History is limited secondary to dementia.  Family member with her today states that she does complain of pain and she has not been doing any walking with physical therapy.  They are also having difficulties with her eating at the nursing home.    PMH:   Patient Active Problem List   Diagnosis   • Acute UTI   • Failure to thrive in adult   • Intertrochanteric fracture (CMS/HCC)   • Closed displaced intertrochanteric fracture of right femur (CMS/HCC)   • S/P ORIF (open reduction internal fixation) fracture   • Fracture, Colles, left, closed       Exam:  General Appearance:    83 y.o. female  cooperative, in no acute distress.  Alert and oriented x 3,                   Vitals:    19 1433   Weight: 38.6 kg (85 lb)   Height: 185 cm (72.84\")          Body mass index is 11.27 kg/m².    On examination today remove the short arm fiberglass cast from the left wrist.  Her skin was intact throughout.  She had no significant swelling.  She had mild deformity and limited motion.  Her neurovascular status was intact.    Examination of the right hip reveals a well-healed incision with good mobility and no significant pain upon rotation.  Her neurovascular status was grossly intact.    Radiology:  3 views of the left wrist were reviewed revealing nondisplaced and minimally posterior angulated distal radius fracture with associated ulnar styloid fracture.    2 views of the right hip were reviewed revealing the healing lesser trochanteric fracture with gamma " nail in good alignment position.    Assessment    ICD-10-CM ICD-9-CM   1. Closed Colles' fracture of left radius with routine healing, subsequent encounter S52.532D V54.12   2. S/P ORIF (open reduction internal fixation) fracture Z96.7 V45.89    Z87.81 V15.51   3. Closed displaced intertrochanteric fracture of right femur with routine healing, subsequent encounter S72.141D V54.13       Plan:  83-year-old female status post ORIF of the right hip with gamma nail for treatment of intertrochanteric hip fracture.  She is now approximately 2-1/2 months postop.  Hip x-rays look good with no change in alignment and evidence of healing.  We would like physical therapy to begin to progress her weightbearing as tolerated with a walker.  Regards to the left wrist she is now 4 weeks out from a Colles' fracture.  X-rays out of cast today show slight posterior angulation but still acceptable alignment.  Clinically she is doing well.  Today we placed her in a cock-up wrist brace.  They can remove it for gentle range of motion and bathing purposes.  Would like to see her back in 5 weeks for reevaluation of both the hip and wrist.    Written by, Mayra GONZALEZ, acting as a scribe for Dr. Burris

## 2019-06-25 ENCOUNTER — HOSPITAL ENCOUNTER (EMERGENCY)
Facility: HOSPITAL | Age: 84
Discharge: NURSING, REHAB OR LONG-TERM CARE | End: 2019-06-25
Attending: EMERGENCY MEDICINE | Admitting: EMERGENCY MEDICINE

## 2019-06-25 ENCOUNTER — APPOINTMENT (OUTPATIENT)
Dept: GENERAL RADIOLOGY | Facility: HOSPITAL | Age: 84
End: 2019-06-25

## 2019-06-25 VITALS
RESPIRATION RATE: 16 BRPM | WEIGHT: 85 LBS | HEART RATE: 70 BPM | OXYGEN SATURATION: 89 % | DIASTOLIC BLOOD PRESSURE: 78 MMHG | HEIGHT: 72 IN | BODY MASS INDEX: 11.51 KG/M2 | TEMPERATURE: 97.4 F | SYSTOLIC BLOOD PRESSURE: 146 MMHG

## 2019-06-25 DIAGNOSIS — L03.313 CELLULITIS OF CHEST WALL: Primary | ICD-10-CM

## 2019-06-25 LAB
A-A DO2: 33.9 MMHG (ref 0–300)
ALBUMIN SERPL-MCNC: 3.28 G/DL (ref 3.5–5.2)
ALBUMIN/GLOB SERPL: 1 G/DL
ALP SERPL-CCNC: 125 U/L (ref 39–117)
ALT SERPL W P-5'-P-CCNC: 21 U/L (ref 1–33)
ANION GAP SERPL CALCULATED.3IONS-SCNC: 12.8 MMOL/L
ARTERIAL PATENCY WRIST A: ABNORMAL
AST SERPL-CCNC: 25 U/L (ref 1–32)
ATMOSPHERIC PRESS: 727 MMHG
BASE EXCESS BLDA CALC-SCNC: 4.1 MMOL/L
BASOPHILS # BLD AUTO: 0.01 10*3/MM3 (ref 0–0.2)
BASOPHILS NFR BLD AUTO: 0.1 % (ref 0–1.5)
BDY SITE: ABNORMAL
BILIRUB SERPL-MCNC: 0.3 MG/DL (ref 0.2–1.2)
BODY TEMPERATURE: 98.6 C
BUN BLD-MCNC: 34 MG/DL (ref 8–23)
BUN/CREAT SERPL: 41.5 (ref 7–25)
CALCIUM SPEC-SCNC: 9.2 MG/DL (ref 8.6–10.5)
CHLORIDE SERPL-SCNC: 97 MMOL/L (ref 98–107)
CO2 SERPL-SCNC: 30.2 MMOL/L (ref 22–29)
COHGB MFR BLD: 1.3 % (ref 0–5)
CREAT BLD-MCNC: 0.82 MG/DL (ref 0.57–1)
D-LACTATE SERPL-SCNC: 2.4 MMOL/L (ref 0.5–2)
DEPRECATED RDW RBC AUTO: 49.3 FL (ref 37–54)
EOSINOPHIL # BLD AUTO: 0 10*3/MM3 (ref 0–0.4)
EOSINOPHIL NFR BLD AUTO: 0 % (ref 0.3–6.2)
ERYTHROCYTE [DISTWIDTH] IN BLOOD BY AUTOMATED COUNT: 15.6 % (ref 12.3–15.4)
GFR SERPL CREATININE-BSD FRML MDRD: 67 ML/MIN/1.73
GLOBULIN UR ELPH-MCNC: 3.2 GM/DL
GLUCOSE BLD-MCNC: 141 MG/DL (ref 65–99)
HCO3 BLDA-SCNC: 27.6 MMOL/L (ref 22–26)
HCT VFR BLD AUTO: 37.4 % (ref 34–46.6)
HCT VFR BLD CALC: 37 % (ref 37–47)
HGB BLD-MCNC: 12.4 G/DL (ref 12–15.9)
HGB BLDA-MCNC: 12.5 G/DL (ref 12–16)
HOLD SPECIMEN: NORMAL
HOROWITZ INDEX BLD+IHG-RTO: 21 %
IMM GRANULOCYTES # BLD AUTO: 0.07 10*3/MM3 (ref 0–0.05)
IMM GRANULOCYTES NFR BLD AUTO: 0.4 % (ref 0–0.5)
LYMPHOCYTES # BLD AUTO: 0.93 10*3/MM3 (ref 0.7–3.1)
LYMPHOCYTES NFR BLD AUTO: 5 % (ref 19.6–45.3)
MAGNESIUM SERPL-MCNC: 1.8 MG/DL (ref 1.6–2.4)
MCH RBC QN AUTO: 29.1 PG (ref 26.6–33)
MCHC RBC AUTO-ENTMCNC: 33.2 G/DL (ref 31.5–35.7)
MCV RBC AUTO: 87.8 FL (ref 79–97)
METHGB BLD QL: 0.3 % (ref 0–3)
MODALITY: ABNORMAL
MONOCYTES # BLD AUTO: 0.61 10*3/MM3 (ref 0.1–0.9)
MONOCYTES NFR BLD AUTO: 3.3 % (ref 5–12)
NEUTROPHILS # BLD AUTO: 16.86 10*3/MM3 (ref 1.7–7)
NEUTROPHILS NFR BLD AUTO: 91.2 % (ref 42.7–76)
OXYHGB MFR BLDV: 91.9 % (ref 85–100)
PCO2 BLDA: 37.3 MM HG (ref 35–45)
PH BLDA: 7.49 PH UNITS (ref 7.35–7.45)
PLATELET # BLD AUTO: 190 10*3/MM3 (ref 140–450)
PMV BLD AUTO: 10 FL (ref 6–12)
PO2 BLDA: 64.2 MM HG (ref 80–100)
POTASSIUM BLD-SCNC: 4.6 MMOL/L (ref 3.5–5.2)
PROT SERPL-MCNC: 6.5 G/DL (ref 6–8.5)
RBC # BLD AUTO: 4.26 10*6/MM3 (ref 3.77–5.28)
SAO2 % BLDCOA: 93.4 % (ref 90–100)
SODIUM BLD-SCNC: 140 MMOL/L (ref 136–145)
WBC NRBC COR # BLD: 18.48 10*3/MM3 (ref 3.4–10.8)

## 2019-06-25 PROCEDURE — 71045 X-RAY EXAM CHEST 1 VIEW: CPT

## 2019-06-25 PROCEDURE — 99284 EMERGENCY DEPT VISIT MOD MDM: CPT

## 2019-06-25 PROCEDURE — 87040 BLOOD CULTURE FOR BACTERIA: CPT | Performed by: EMERGENCY MEDICINE

## 2019-06-25 PROCEDURE — 87147 CULTURE TYPE IMMUNOLOGIC: CPT | Performed by: EMERGENCY MEDICINE

## 2019-06-25 PROCEDURE — 82805 BLOOD GASES W/O2 SATURATION: CPT | Performed by: EMERGENCY MEDICINE

## 2019-06-25 PROCEDURE — 36600 WITHDRAWAL OF ARTERIAL BLOOD: CPT | Performed by: EMERGENCY MEDICINE

## 2019-06-25 PROCEDURE — 87150 DNA/RNA AMPLIFIED PROBE: CPT | Performed by: EMERGENCY MEDICINE

## 2019-06-25 PROCEDURE — 87186 SC STD MICRODIL/AGAR DIL: CPT | Performed by: EMERGENCY MEDICINE

## 2019-06-25 PROCEDURE — 83050 HGB METHEMOGLOBIN QUAN: CPT | Performed by: EMERGENCY MEDICINE

## 2019-06-25 PROCEDURE — 96365 THER/PROPH/DIAG IV INF INIT: CPT

## 2019-06-25 PROCEDURE — 83605 ASSAY OF LACTIC ACID: CPT | Performed by: EMERGENCY MEDICINE

## 2019-06-25 PROCEDURE — 25010000002 CEFTRIAXONE: Performed by: EMERGENCY MEDICINE

## 2019-06-25 PROCEDURE — 80053 COMPREHEN METABOLIC PANEL: CPT | Performed by: EMERGENCY MEDICINE

## 2019-06-25 PROCEDURE — 83735 ASSAY OF MAGNESIUM: CPT | Performed by: EMERGENCY MEDICINE

## 2019-06-25 PROCEDURE — 82375 ASSAY CARBOXYHB QUANT: CPT | Performed by: EMERGENCY MEDICINE

## 2019-06-25 PROCEDURE — 71045 X-RAY EXAM CHEST 1 VIEW: CPT | Performed by: RADIOLOGY

## 2019-06-25 PROCEDURE — 85025 COMPLETE CBC W/AUTO DIFF WBC: CPT | Performed by: EMERGENCY MEDICINE

## 2019-06-25 RX ORDER — SODIUM CHLORIDE 0.9 % (FLUSH) 0.9 %
10 SYRINGE (ML) INJECTION AS NEEDED
Status: DISCONTINUED | OUTPATIENT
Start: 2019-06-25 | End: 2019-06-25 | Stop reason: HOSPADM

## 2019-06-25 RX ADMIN — CEFTRIAXONE 1 G: 1 INJECTION, POWDER, FOR SOLUTION INTRAMUSCULAR; INTRAVENOUS at 18:05

## 2019-06-25 RX ADMIN — SODIUM CHLORIDE 1000 ML: 9 INJECTION, SOLUTION INTRAVENOUS at 17:26

## 2019-06-26 ENCOUNTER — APPOINTMENT (OUTPATIENT)
Dept: GENERAL RADIOLOGY | Facility: HOSPITAL | Age: 84
End: 2019-06-26

## 2019-06-26 ENCOUNTER — HOSPITAL ENCOUNTER (INPATIENT)
Facility: HOSPITAL | Age: 84
LOS: 13 days | Discharge: NURSING, REHAB OR LONG-TERM CARE | End: 2019-07-10
Attending: EMERGENCY MEDICINE | Admitting: INTERNAL MEDICINE

## 2019-06-26 DIAGNOSIS — R78.81 MRSA BACTEREMIA: ICD-10-CM

## 2019-06-26 DIAGNOSIS — R78.81 BACTEREMIA: Primary | ICD-10-CM

## 2019-06-26 DIAGNOSIS — L02.219 ABSCESS, TRUNK: ICD-10-CM

## 2019-06-26 DIAGNOSIS — B95.62 MRSA BACTEREMIA: ICD-10-CM

## 2019-06-26 LAB
ALBUMIN SERPL-MCNC: 2.89 G/DL (ref 3.5–5.2)
ALBUMIN/GLOB SERPL: 0.9 G/DL
ALP SERPL-CCNC: 118 U/L (ref 39–117)
ALT SERPL W P-5'-P-CCNC: 21 U/L (ref 1–33)
ANION GAP SERPL CALCULATED.3IONS-SCNC: 10.2 MMOL/L (ref 5–15)
APTT PPP: 59.6 SECONDS (ref 23.8–36.1)
AST SERPL-CCNC: 27 U/L (ref 1–32)
BACTERIA BLD CULT: ABNORMAL
BACTERIA UR QL AUTO: ABNORMAL /HPF
BASOPHILS # BLD AUTO: 0.01 10*3/MM3 (ref 0–0.2)
BASOPHILS NFR BLD AUTO: 0.1 % (ref 0–1.5)
BILIRUB SERPL-MCNC: <0.2 MG/DL (ref 0.2–1.2)
BILIRUB UR QL STRIP: NEGATIVE
BUN BLD-MCNC: 38 MG/DL (ref 8–23)
BUN/CREAT SERPL: 55.9 (ref 7–25)
CALCIUM SPEC-SCNC: 9.4 MG/DL (ref 8.6–10.5)
CHLORIDE SERPL-SCNC: 102 MMOL/L (ref 98–107)
CLARITY UR: ABNORMAL
CO2 SERPL-SCNC: 30.8 MMOL/L (ref 22–29)
COLOR UR: ABNORMAL
CREAT BLD-MCNC: 0.68 MG/DL (ref 0.57–1)
CRP SERPL-MCNC: 8.8 MG/DL (ref 0–0.5)
D-LACTATE SERPL-SCNC: 1.7 MMOL/L (ref 0.5–2)
DEPRECATED RDW RBC AUTO: 50.9 FL (ref 37–54)
EOSINOPHIL # BLD AUTO: 0.01 10*3/MM3 (ref 0–0.4)
EOSINOPHIL NFR BLD AUTO: 0.1 % (ref 0.3–6.2)
ERYTHROCYTE [DISTWIDTH] IN BLOOD BY AUTOMATED COUNT: 15.6 % (ref 12.3–15.4)
GFR SERPL CREATININE-BSD FRML MDRD: 83 ML/MIN/1.73
GLOBULIN UR ELPH-MCNC: 3.2 GM/DL
GLUCOSE BLD-MCNC: 165 MG/DL (ref 65–99)
GLUCOSE UR STRIP-MCNC: NEGATIVE MG/DL
HCT VFR BLD AUTO: 36.4 % (ref 34–46.6)
HGB BLD-MCNC: 11.2 G/DL (ref 12–15.9)
HGB UR QL STRIP.AUTO: ABNORMAL
HYALINE CASTS UR QL AUTO: ABNORMAL /LPF
IMM GRANULOCYTES # BLD AUTO: 0.02 10*3/MM3 (ref 0–0.05)
IMM GRANULOCYTES NFR BLD AUTO: 0.2 % (ref 0–0.5)
INR PPP: 1.19 (ref 0.9–1.1)
KETONES UR QL STRIP: NEGATIVE
LEUKOCYTE ESTERASE UR QL STRIP.AUTO: ABNORMAL
LYMPHOCYTES # BLD AUTO: 0.6 10*3/MM3 (ref 0.7–3.1)
LYMPHOCYTES NFR BLD AUTO: 5.7 % (ref 19.6–45.3)
MCH RBC QN AUTO: 27.9 PG (ref 26.6–33)
MCHC RBC AUTO-ENTMCNC: 30.8 G/DL (ref 31.5–35.7)
MCV RBC AUTO: 90.5 FL (ref 79–97)
MONOCYTES # BLD AUTO: 0.7 10*3/MM3 (ref 0.1–0.9)
MONOCYTES NFR BLD AUTO: 6.7 % (ref 5–12)
NEUTROPHILS # BLD AUTO: 9.12 10*3/MM3 (ref 1.7–7)
NEUTROPHILS NFR BLD AUTO: 87.2 % (ref 42.7–76)
NITRITE UR QL STRIP: NEGATIVE
PH UR STRIP.AUTO: 6 [PH] (ref 5–8)
PLATELET # BLD AUTO: 189 10*3/MM3 (ref 140–450)
PMV BLD AUTO: 10.3 FL (ref 6–12)
POTASSIUM BLD-SCNC: 4 MMOL/L (ref 3.5–5.2)
PROT SERPL-MCNC: 6.1 G/DL (ref 6–8.5)
PROT UR QL STRIP: ABNORMAL
PROTHROMBIN TIME: 15.7 SECONDS (ref 11–15.4)
RBC # BLD AUTO: 4.02 10*6/MM3 (ref 3.77–5.28)
RBC # UR: ABNORMAL /HPF
REF LAB TEST METHOD: ABNORMAL
SODIUM BLD-SCNC: 143 MMOL/L (ref 136–145)
SP GR UR STRIP: 1.03 (ref 1–1.03)
SQUAMOUS #/AREA URNS HPF: ABNORMAL /HPF
UROBILINOGEN UR QL STRIP: ABNORMAL
WBC NRBC COR # BLD: 10.46 10*3/MM3 (ref 3.4–10.8)
WBC UR QL AUTO: ABNORMAL /HPF
YEAST URNS QL MICRO: ABNORMAL /HPF

## 2019-06-26 PROCEDURE — 85610 PROTHROMBIN TIME: CPT | Performed by: EMERGENCY MEDICINE

## 2019-06-26 PROCEDURE — 87086 URINE CULTURE/COLONY COUNT: CPT | Performed by: NURSE PRACTITIONER

## 2019-06-26 PROCEDURE — 87040 BLOOD CULTURE FOR BACTERIA: CPT | Performed by: EMERGENCY MEDICINE

## 2019-06-26 PROCEDURE — 99285 EMERGENCY DEPT VISIT HI MDM: CPT

## 2019-06-26 PROCEDURE — 83605 ASSAY OF LACTIC ACID: CPT | Performed by: EMERGENCY MEDICINE

## 2019-06-26 PROCEDURE — 86140 C-REACTIVE PROTEIN: CPT | Performed by: EMERGENCY MEDICINE

## 2019-06-26 PROCEDURE — 25010000002 PIPERACILLIN-TAZOBACTAM: Performed by: EMERGENCY MEDICINE

## 2019-06-26 PROCEDURE — 81001 URINALYSIS AUTO W/SCOPE: CPT | Performed by: EMERGENCY MEDICINE

## 2019-06-26 PROCEDURE — 80053 COMPREHEN METABOLIC PANEL: CPT | Performed by: EMERGENCY MEDICINE

## 2019-06-26 PROCEDURE — 85730 THROMBOPLASTIN TIME PARTIAL: CPT | Performed by: EMERGENCY MEDICINE

## 2019-06-26 PROCEDURE — 71045 X-RAY EXAM CHEST 1 VIEW: CPT | Performed by: RADIOLOGY

## 2019-06-26 PROCEDURE — 85025 COMPLETE CBC W/AUTO DIFF WBC: CPT | Performed by: EMERGENCY MEDICINE

## 2019-06-26 PROCEDURE — 25010000002 VANCOMYCIN 5 G RECONSTITUTED SOLUTION 5,000 MG VIAL: Performed by: EMERGENCY MEDICINE

## 2019-06-26 PROCEDURE — 71045 X-RAY EXAM CHEST 1 VIEW: CPT

## 2019-06-26 RX ORDER — SODIUM CHLORIDE 0.9 % (FLUSH) 0.9 %
10 SYRINGE (ML) INJECTION AS NEEDED
Status: DISCONTINUED | OUTPATIENT
Start: 2019-06-26 | End: 2019-07-10 | Stop reason: HOSPADM

## 2019-06-26 RX ADMIN — VANCOMYCIN HYDROCHLORIDE 750 MG: 5 INJECTION, POWDER, LYOPHILIZED, FOR SOLUTION INTRAVENOUS at 22:11

## 2019-06-26 RX ADMIN — PIPERACILLIN AND TAZOBACTAM 4.5 G: 4; .5 INJECTION, POWDER, LYOPHILIZED, FOR SOLUTION INTRAVENOUS; PARENTERAL at 21:40

## 2019-06-27 ENCOUNTER — ANESTHESIA EVENT (OUTPATIENT)
Dept: PERIOP | Facility: HOSPITAL | Age: 84
End: 2019-06-27

## 2019-06-27 ENCOUNTER — ANESTHESIA (OUTPATIENT)
Dept: PERIOP | Facility: HOSPITAL | Age: 84
End: 2019-06-27

## 2019-06-27 ENCOUNTER — APPOINTMENT (OUTPATIENT)
Dept: CARDIOLOGY | Facility: HOSPITAL | Age: 84
End: 2019-06-27

## 2019-06-27 PROBLEM — L02.219 ABSCESS, TRUNK: Status: ACTIVE | Noted: 2019-06-26

## 2019-06-27 PROBLEM — R78.81 MRSA BACTEREMIA: Status: ACTIVE | Noted: 2019-06-27

## 2019-06-27 PROBLEM — B95.62 MRSA BACTEREMIA: Status: ACTIVE | Noted: 2019-06-27

## 2019-06-27 LAB
ALBUMIN SERPL-MCNC: 2.45 G/DL (ref 3.5–5.2)
ALBUMIN/GLOB SERPL: 0.9 G/DL
ALP SERPL-CCNC: 91 U/L (ref 39–117)
ALT SERPL W P-5'-P-CCNC: 18 U/L (ref 1–33)
ANION GAP SERPL CALCULATED.3IONS-SCNC: 8.9 MMOL/L (ref 5–15)
AST SERPL-CCNC: 22 U/L (ref 1–32)
BASOPHILS # BLD AUTO: 0.01 10*3/MM3 (ref 0–0.2)
BASOPHILS NFR BLD AUTO: 0.1 % (ref 0–1.5)
BILIRUB SERPL-MCNC: 0.2 MG/DL (ref 0.2–1.2)
BUN BLD-MCNC: 34 MG/DL (ref 8–23)
BUN/CREAT SERPL: 52.3 (ref 7–25)
CALCIUM SPEC-SCNC: 8.3 MG/DL (ref 8.6–10.5)
CHLORIDE SERPL-SCNC: 105 MMOL/L (ref 98–107)
CO2 SERPL-SCNC: 28.1 MMOL/L (ref 22–29)
CREAT BLD-MCNC: 0.65 MG/DL (ref 0.57–1)
CRP SERPL-MCNC: 6.32 MG/DL (ref 0–0.5)
DEPRECATED RDW RBC AUTO: 50.3 FL (ref 37–54)
EOSINOPHIL # BLD AUTO: 0.02 10*3/MM3 (ref 0–0.4)
EOSINOPHIL NFR BLD AUTO: 0.2 % (ref 0.3–6.2)
ERYTHROCYTE [DISTWIDTH] IN BLOOD BY AUTOMATED COUNT: 15.6 % (ref 12.3–15.4)
GFR SERPL CREATININE-BSD FRML MDRD: 87 ML/MIN/1.73
GLOBULIN UR ELPH-MCNC: 2.8 GM/DL
GLUCOSE BLD-MCNC: 122 MG/DL (ref 65–99)
HCT VFR BLD AUTO: 32 % (ref 34–46.6)
HGB BLD-MCNC: 9.8 G/DL (ref 12–15.9)
IMM GRANULOCYTES # BLD AUTO: 0.02 10*3/MM3 (ref 0–0.05)
IMM GRANULOCYTES NFR BLD AUTO: 0.2 % (ref 0–0.5)
LYMPHOCYTES # BLD AUTO: 0.81 10*3/MM3 (ref 0.7–3.1)
LYMPHOCYTES NFR BLD AUTO: 7.9 % (ref 19.6–45.3)
MCH RBC QN AUTO: 28.2 PG (ref 26.6–33)
MCHC RBC AUTO-ENTMCNC: 30.6 G/DL (ref 31.5–35.7)
MCV RBC AUTO: 92 FL (ref 79–97)
MONOCYTES # BLD AUTO: 0.76 10*3/MM3 (ref 0.1–0.9)
MONOCYTES NFR BLD AUTO: 7.4 % (ref 5–12)
NEUTROPHILS # BLD AUTO: 8.64 10*3/MM3 (ref 1.7–7)
NEUTROPHILS NFR BLD AUTO: 84.2 % (ref 42.7–76)
PLATELET # BLD AUTO: 183 10*3/MM3 (ref 140–450)
PMV BLD AUTO: 10.3 FL (ref 6–12)
POTASSIUM BLD-SCNC: 4.2 MMOL/L (ref 3.5–5.2)
PROT SERPL-MCNC: 5.2 G/DL (ref 6–8.5)
RBC # BLD AUTO: 3.48 10*6/MM3 (ref 3.77–5.28)
SODIUM BLD-SCNC: 142 MMOL/L (ref 136–145)
T3FREE SERPL-MCNC: 1.82 PG/ML (ref 2–4.4)
T4 FREE SERPL-MCNC: 0.81 NG/DL (ref 0.93–1.7)
TSH SERPL DL<=0.05 MIU/L-ACNC: 2.83 MIU/ML (ref 0.27–4.2)
WBC NRBC COR # BLD: 10.26 10*3/MM3 (ref 3.4–10.8)

## 2019-06-27 PROCEDURE — 10121 INC&RMVL FB SUBQ TISS COMP: CPT | Performed by: SURGERY

## 2019-06-27 PROCEDURE — 25010000002 CEFTRIAXONE: Performed by: HOSPITALIST

## 2019-06-27 PROCEDURE — 25010000002 VANCOMYCIN 5 G RECONSTITUTED SOLUTION 5,000 MG VIAL: Performed by: HOSPITALIST

## 2019-06-27 PROCEDURE — 86140 C-REACTIVE PROTEIN: CPT | Performed by: HOSPITALIST

## 2019-06-27 PROCEDURE — 25010000002 FENTANYL CITRATE (PF) 100 MCG/2ML SOLUTION: Performed by: ANESTHESIOLOGY

## 2019-06-27 PROCEDURE — 88300 SURGICAL PATH GROSS: CPT | Performed by: SURGERY

## 2019-06-27 PROCEDURE — 84443 ASSAY THYROID STIM HORMONE: CPT | Performed by: HOSPITALIST

## 2019-06-27 PROCEDURE — 99223 1ST HOSP IP/OBS HIGH 75: CPT | Performed by: HOSPITALIST

## 2019-06-27 PROCEDURE — 0PC00ZZ EXTIRPATION OF MATTER FROM STERNUM, OPEN APPROACH: ICD-10-PCS | Performed by: SURGERY

## 2019-06-27 PROCEDURE — 94799 UNLISTED PULMONARY SVC/PX: CPT

## 2019-06-27 PROCEDURE — 80053 COMPREHEN METABOLIC PANEL: CPT | Performed by: HOSPITALIST

## 2019-06-27 PROCEDURE — 84439 ASSAY OF FREE THYROXINE: CPT | Performed by: HOSPITALIST

## 2019-06-27 PROCEDURE — 99221 1ST HOSP IP/OBS SF/LOW 40: CPT | Performed by: SURGERY

## 2019-06-27 PROCEDURE — 85025 COMPLETE CBC W/AUTO DIFF WBC: CPT | Performed by: HOSPITALIST

## 2019-06-27 PROCEDURE — 25010000002 MORPHINE PER 10 MG: Performed by: HOSPITALIST

## 2019-06-27 PROCEDURE — 84481 FREE ASSAY (FT-3): CPT | Performed by: HOSPITALIST

## 2019-06-27 PROCEDURE — 10061 I&D ABSCESS COMP/MULTIPLE: CPT | Performed by: SURGERY

## 2019-06-27 PROCEDURE — 0P900ZZ DRAINAGE OF STERNUM, OPEN APPROACH: ICD-10-PCS | Performed by: SURGERY

## 2019-06-27 RX ORDER — MORPHINE SULFATE 2 MG/ML
1 INJECTION, SOLUTION INTRAMUSCULAR; INTRAVENOUS EVERY 4 HOURS PRN
Status: DISPENSED | OUTPATIENT
Start: 2019-06-27 | End: 2019-07-07

## 2019-06-27 RX ORDER — CARBOXYMETHYLCELLULOSE SODIUM 5 MG/ML
1 SOLUTION/ DROPS OPHTHALMIC 2 TIMES DAILY PRN
Status: DISCONTINUED | OUTPATIENT
Start: 2019-06-27 | End: 2019-07-10 | Stop reason: HOSPADM

## 2019-06-27 RX ORDER — CARBAMAZEPINE 200 MG/1
200 TABLET ORAL EVERY 12 HOURS
Status: DISCONTINUED | OUTPATIENT
Start: 2019-06-27 | End: 2019-07-10 | Stop reason: HOSPADM

## 2019-06-27 RX ORDER — IBUPROFEN 200 MG
TABLET ORAL EVERY 12 HOURS SCHEDULED
Status: DISCONTINUED | OUTPATIENT
Start: 2019-06-27 | End: 2019-06-28

## 2019-06-27 RX ORDER — SODIUM CHLORIDE 0.9 % (FLUSH) 0.9 %
3-10 SYRINGE (ML) INJECTION AS NEEDED
Status: DISCONTINUED | OUTPATIENT
Start: 2019-06-27 | End: 2019-06-27 | Stop reason: HOSPADM

## 2019-06-27 RX ORDER — MULTIVITAMIN
1 TABLET ORAL DAILY
Status: DISCONTINUED | OUTPATIENT
Start: 2019-06-27 | End: 2019-07-10 | Stop reason: HOSPADM

## 2019-06-27 RX ORDER — SODIUM CHLORIDE 0.9 % (FLUSH) 0.9 %
3 SYRINGE (ML) INJECTION EVERY 12 HOURS SCHEDULED
Status: DISCONTINUED | OUTPATIENT
Start: 2019-06-27 | End: 2019-06-27 | Stop reason: HOSPADM

## 2019-06-27 RX ORDER — CARBAMAZEPINE 200 MG/1
200 TABLET ORAL EVERY 12 HOURS
Status: CANCELLED | OUTPATIENT
Start: 2019-06-27

## 2019-06-27 RX ORDER — SODIUM CHLORIDE, SODIUM LACTATE, POTASSIUM CHLORIDE, CALCIUM CHLORIDE 600; 310; 30; 20 MG/100ML; MG/100ML; MG/100ML; MG/100ML
125 INJECTION, SOLUTION INTRAVENOUS CONTINUOUS
Status: DISCONTINUED | OUTPATIENT
Start: 2019-06-27 | End: 2019-06-27

## 2019-06-27 RX ORDER — FENTANYL CITRATE 50 UG/ML
INJECTION, SOLUTION INTRAMUSCULAR; INTRAVENOUS AS NEEDED
Status: DISCONTINUED | OUTPATIENT
Start: 2019-06-27 | End: 2019-06-27 | Stop reason: SURG

## 2019-06-27 RX ORDER — ROCURONIUM BROMIDE 10 MG/ML
INJECTION, SOLUTION INTRAVENOUS AS NEEDED
Status: DISCONTINUED | OUTPATIENT
Start: 2019-06-27 | End: 2019-06-27 | Stop reason: SURG

## 2019-06-27 RX ORDER — DABIGATRAN ETEXILATE 150 MG/1
150 CAPSULE ORAL 2 TIMES DAILY
Status: CANCELLED | OUTPATIENT
Start: 2019-06-27

## 2019-06-27 RX ORDER — LEVOTHYROXINE SODIUM 0.03 MG/1
25 TABLET ORAL DAILY
Status: CANCELLED | OUTPATIENT
Start: 2019-06-27

## 2019-06-27 RX ORDER — ACETAMINOPHEN 325 MG/1
325 TABLET ORAL EVERY 4 HOURS PRN
Status: DISCONTINUED | OUTPATIENT
Start: 2019-06-27 | End: 2019-07-10 | Stop reason: HOSPADM

## 2019-06-27 RX ORDER — NITROGLYCERIN 0.4 MG/1
0.4 TABLET SUBLINGUAL
Status: DISCONTINUED | OUTPATIENT
Start: 2019-06-27 | End: 2019-07-10 | Stop reason: HOSPADM

## 2019-06-27 RX ORDER — ONDANSETRON 2 MG/ML
4 INJECTION INTRAMUSCULAR; INTRAVENOUS ONCE AS NEEDED
Status: DISCONTINUED | OUTPATIENT
Start: 2019-06-27 | End: 2019-06-27 | Stop reason: HOSPADM

## 2019-06-27 RX ORDER — ACETAMINOPHEN 325 MG/1
325 TABLET ORAL EVERY 4 HOURS PRN
COMMUNITY

## 2019-06-27 RX ORDER — SODIUM CHLORIDE 0.9 % (FLUSH) 0.9 %
3-10 SYRINGE (ML) INJECTION AS NEEDED
Status: DISCONTINUED | OUTPATIENT
Start: 2019-06-27 | End: 2019-07-10 | Stop reason: HOSPADM

## 2019-06-27 RX ORDER — IPRATROPIUM BROMIDE AND ALBUTEROL SULFATE 2.5; .5 MG/3ML; MG/3ML
3 SOLUTION RESPIRATORY (INHALATION) ONCE AS NEEDED
Status: DISCONTINUED | OUTPATIENT
Start: 2019-06-27 | End: 2019-06-27 | Stop reason: HOSPADM

## 2019-06-27 RX ORDER — MULTIVITAMIN
1 TABLET ORAL DAILY
Status: CANCELLED | OUTPATIENT
Start: 2019-06-27

## 2019-06-27 RX ORDER — MEPERIDINE HYDROCHLORIDE 25 MG/ML
12.5 INJECTION INTRAMUSCULAR; INTRAVENOUS; SUBCUTANEOUS
Status: DISCONTINUED | OUTPATIENT
Start: 2019-06-27 | End: 2019-06-27 | Stop reason: HOSPADM

## 2019-06-27 RX ORDER — MIRTAZAPINE 15 MG/1
15 TABLET, FILM COATED ORAL NIGHTLY
Status: DISCONTINUED | OUTPATIENT
Start: 2019-06-27 | End: 2019-07-10 | Stop reason: HOSPADM

## 2019-06-27 RX ORDER — LEVOTHYROXINE SODIUM 0.03 MG/1
25 TABLET ORAL
Status: DISCONTINUED | OUTPATIENT
Start: 2019-06-27 | End: 2019-07-10 | Stop reason: HOSPADM

## 2019-06-27 RX ORDER — POLYETHYLENE GLYCOL 3350 17 G/17G
17 POWDER, FOR SOLUTION ORAL DAILY PRN
Status: DISCONTINUED | OUTPATIENT
Start: 2019-06-27 | End: 2019-07-10 | Stop reason: HOSPADM

## 2019-06-27 RX ORDER — CYANOCOBALAMIN 1000 UG/ML
1000 INJECTION, SOLUTION INTRAMUSCULAR; SUBCUTANEOUS WEEKLY
Status: CANCELLED | OUTPATIENT
Start: 2019-06-29

## 2019-06-27 RX ORDER — HYDROCODONE BITARTRATE AND ACETAMINOPHEN 5; 325 MG/1; MG/1
1 TABLET ORAL EVERY 4 HOURS PRN
Status: CANCELLED | OUTPATIENT
Start: 2019-06-27

## 2019-06-27 RX ORDER — MIRTAZAPINE 15 MG/1
15 TABLET, FILM COATED ORAL NIGHTLY
COMMUNITY

## 2019-06-27 RX ORDER — FENTANYL CITRATE 50 UG/ML
50 INJECTION, SOLUTION INTRAMUSCULAR; INTRAVENOUS
Status: DISCONTINUED | OUTPATIENT
Start: 2019-06-27 | End: 2019-06-27 | Stop reason: HOSPADM

## 2019-06-27 RX ORDER — CARBOXYMETHYLCELLULOSE SODIUM 5 MG/ML
1 SOLUTION/ DROPS OPHTHALMIC 2 TIMES DAILY PRN
Status: CANCELLED | OUTPATIENT
Start: 2019-06-27

## 2019-06-27 RX ORDER — FERROUS SULFATE 325(65) MG
325 TABLET ORAL 2 TIMES DAILY
Status: CANCELLED | OUTPATIENT
Start: 2019-06-27

## 2019-06-27 RX ORDER — ACETAMINOPHEN 325 MG/1
325 TABLET ORAL EVERY 4 HOURS PRN
Status: CANCELLED | OUTPATIENT
Start: 2019-06-27

## 2019-06-27 RX ORDER — SODIUM CHLORIDE 0.9 % (FLUSH) 0.9 %
3 SYRINGE (ML) INJECTION EVERY 12 HOURS SCHEDULED
Status: DISCONTINUED | OUTPATIENT
Start: 2019-06-27 | End: 2019-07-10 | Stop reason: HOSPADM

## 2019-06-27 RX ORDER — ASPIRIN 81 MG/1
81 TABLET, CHEWABLE ORAL DAILY
Status: DISCONTINUED | OUTPATIENT
Start: 2019-06-27 | End: 2019-07-10 | Stop reason: HOSPADM

## 2019-06-27 RX ORDER — ASPIRIN 81 MG/1
81 TABLET, CHEWABLE ORAL DAILY
Status: CANCELLED | OUTPATIENT
Start: 2019-06-27

## 2019-06-27 RX ORDER — MIRTAZAPINE 15 MG/1
15 TABLET, FILM COATED ORAL NIGHTLY
Status: CANCELLED | OUTPATIENT
Start: 2019-06-27

## 2019-06-27 RX ORDER — CYANOCOBALAMIN 1000 UG/ML
1000 INJECTION, SOLUTION INTRAMUSCULAR; SUBCUTANEOUS WEEKLY
Status: DISCONTINUED | OUTPATIENT
Start: 2019-06-29 | End: 2019-07-10 | Stop reason: HOSPADM

## 2019-06-27 RX ORDER — HYDROCODONE BITARTRATE AND ACETAMINOPHEN 5; 325 MG/1; MG/1
1 TABLET ORAL EVERY 4 HOURS PRN
Status: DISCONTINUED | OUTPATIENT
Start: 2019-06-27 | End: 2019-07-10 | Stop reason: HOSPADM

## 2019-06-27 RX ORDER — CARBOXYMETHYLCELLULOSE SODIUM 5 MG/ML
1 SOLUTION/ DROPS OPHTHALMIC 2 TIMES DAILY
Status: CANCELLED | OUTPATIENT
Start: 2019-06-27

## 2019-06-27 RX ORDER — POLYETHYLENE GLYCOL 3350 17 G/17G
17 POWDER, FOR SOLUTION ORAL DAILY PRN
Status: CANCELLED | OUTPATIENT
Start: 2019-06-27

## 2019-06-27 RX ORDER — FERROUS SULFATE 325(65) MG
325 TABLET ORAL 2 TIMES DAILY WITH MEALS
Status: DISCONTINUED | OUTPATIENT
Start: 2019-06-27 | End: 2019-07-10 | Stop reason: HOSPADM

## 2019-06-27 RX ORDER — DABIGATRAN ETEXILATE 150 MG/1
150 CAPSULE ORAL EVERY 12 HOURS SCHEDULED
Status: DISCONTINUED | OUTPATIENT
Start: 2019-06-27 | End: 2019-07-01

## 2019-06-27 RX ORDER — SODIUM CHLORIDE 9 MG/ML
50 INJECTION, SOLUTION INTRAVENOUS CONTINUOUS
Status: DISCONTINUED | OUTPATIENT
Start: 2019-06-27 | End: 2019-06-28

## 2019-06-27 RX ORDER — OXYCODONE HYDROCHLORIDE AND ACETAMINOPHEN 5; 325 MG/1; MG/1
1 TABLET ORAL ONCE AS NEEDED
Status: DISCONTINUED | OUTPATIENT
Start: 2019-06-27 | End: 2019-06-27 | Stop reason: HOSPADM

## 2019-06-27 RX ORDER — MAGNESIUM HYDROXIDE 1200 MG/15ML
LIQUID ORAL AS NEEDED
Status: DISCONTINUED | OUTPATIENT
Start: 2019-06-27 | End: 2019-06-27 | Stop reason: HOSPADM

## 2019-06-27 RX ADMIN — SODIUM CHLORIDE 50 ML/HR: 9 INJECTION, SOLUTION INTRAVENOUS at 03:19

## 2019-06-27 RX ADMIN — FENTANYL CITRATE 50 MCG: 50 INJECTION INTRAMUSCULAR; INTRAVENOUS at 17:59

## 2019-06-27 RX ADMIN — BACITRACIN ZINC NEOMYCIN SULFATE POLYMYXIN B SULFATE: 400; 3.5; 5 OINTMENT TOPICAL at 11:21

## 2019-06-27 RX ADMIN — SODIUM CHLORIDE 50 ML/HR: 9 INJECTION, SOLUTION INTRAVENOUS at 07:43

## 2019-06-27 RX ADMIN — SODIUM CHLORIDE, PRESERVATIVE FREE 3 ML: 5 INJECTION INTRAVENOUS at 07:43

## 2019-06-27 RX ADMIN — CEFTRIAXONE 1 G: 1 INJECTION, POWDER, FOR SOLUTION INTRAMUSCULAR; INTRAVENOUS at 03:18

## 2019-06-27 RX ADMIN — MORPHINE SULFATE 1 MG: 2 INJECTION, SOLUTION INTRAMUSCULAR; INTRAVENOUS at 23:39

## 2019-06-27 RX ADMIN — FENTANYL CITRATE 50 MCG: 50 INJECTION INTRAMUSCULAR; INTRAVENOUS at 18:04

## 2019-06-27 RX ADMIN — ROCURONIUM BROMIDE 70 MG: 10 INJECTION INTRAVENOUS at 17:46

## 2019-06-27 RX ADMIN — SODIUM CHLORIDE, POTASSIUM CHLORIDE, SODIUM LACTATE AND CALCIUM CHLORIDE: 600; 310; 30; 20 INJECTION, SOLUTION INTRAVENOUS at 17:42

## 2019-06-27 RX ADMIN — VANCOMYCIN HYDROCHLORIDE 500 MG: 5 INJECTION, POWDER, LYOPHILIZED, FOR SOLUTION INTRAVENOUS at 20:28

## 2019-06-27 NOTE — ANESTHESIA PREPROCEDURE EVALUATION
Anesthesia Evaluation     Patient summary reviewed and Nursing notes reviewed   no history of anesthetic complications:  NPO Solid Status: > 8 hours  NPO Liquid Status: > 8 hours           Airway   Mallampati: II  TM distance: >3 FB  Neck ROM: full  No difficulty expected  Dental - normal exam   (+) edentulous    Pulmonary - normal exam   (+) a smoker Former,   Cardiovascular - normal exam  Exercise tolerance: good (4-7 METS)    NYHA Classification: II  PT is on anticoagulation therapy    (+) pacemaker pacemaker interrogated unknown, hypertension, dysrhythmias, hyperlipidemia,       Neuro/Psych  (+) psychiatric history Depression, dementia, poor historian.,     GI/Hepatic/Renal/Endo    (+)   hypothyroidism,     Musculoskeletal     Abdominal  - normal exam    Bowel sounds: normal.   Substance History - negative use     OB/GYN negative ob/gyn ROS         Other   (+) arthritis                       Anesthesia Plan    ASA 3     general     intravenous induction   Anesthetic plan, all risks, benefits, and alternatives have been provided, discussed and informed consent has been obtained with: legal guardian (consent signed).

## 2019-06-27 NOTE — ANESTHESIA POSTPROCEDURE EVALUATION
Patient: Mary Lou Graves    Procedure Summary     Date:  06/27/19 Room / Location:   [facility] OR 04 /  [facility] OR    Anesthesia Start:  1742 Anesthesia Stop:  1833    Procedure:  INCISION AND DRAINAGE TRUNK (N/A Abdomen) Diagnosis:       Abscess, trunk      (Abscess, trunk [L02.219])    Surgeon:  Imelda Sullivan MD Provider:  Seth Nieto MD    Anesthesia Type:  general ASA Status:  3          Anesthesia Type: general  Last vitals  BP   130/89 (06/27/19 1845)   Temp   97.7 °F (36.5 °C) (06/27/19 1830)   Pulse   69 (06/27/19 1845)   Resp   14 (06/27/19 1845)     SpO2   100 % (06/27/19 1845)     Post Anesthesia Care and Evaluation    Patient location during evaluation: PACU  Patient participation: complete - patient participated  Level of consciousness: awake and alert  Pain score: 1  Pain management: adequate  Airway patency: patent  Anesthetic complications: No anesthetic complications  PONV Status: controlled  Cardiovascular status: acceptable  Respiratory status: acceptable  Hydration status: acceptable

## 2019-06-28 ENCOUNTER — ANESTHESIA (OUTPATIENT)
Dept: PERIOP | Facility: HOSPITAL | Age: 84
End: 2019-06-28

## 2019-06-28 ENCOUNTER — ANESTHESIA EVENT (OUTPATIENT)
Dept: PERIOP | Facility: HOSPITAL | Age: 84
End: 2019-06-28

## 2019-06-28 ENCOUNTER — APPOINTMENT (OUTPATIENT)
Dept: CARDIOLOGY | Facility: HOSPITAL | Age: 84
End: 2019-06-28

## 2019-06-28 LAB
BACTERIA SPEC AEROBE CULT: ABNORMAL
BACTERIA SPEC AEROBE CULT: ABNORMAL
BH CV ECHO MEAS - BSA(HAYCOCK): 1.4 M^2
BH CV ECHO MEAS - BSA: 1.5 M^2
BH CV ECHO MEAS - BZI_BMI: 11.5 KILOGRAMS/M^2
BH CV ECHO MEAS - BZI_METRIC_HEIGHT: 185.4 CM
BH CV ECHO MEAS - BZI_METRIC_WEIGHT: 39.5 KG
BH CV ECHO MEAS - EDV(MOD-SP4): 80 ML
BH CV ECHO MEAS - EF(MOD-SP4): 75 %
BH CV ECHO MEAS - ESV(MOD-SP4): 20 ML
BH CV ECHO MEAS - LV DIASTOLIC VOL/BSA (35-75): 53 ML/M^2
BH CV ECHO MEAS - LV SYSTOLIC VOL/BSA (12-30): 13.2 ML/M^2
BH CV ECHO MEAS - LVLD AP4: 5.7 CM
BH CV ECHO MEAS - LVLS AP4: 3.7 CM
BH CV ECHO MEAS - RAP SYSTOLE: 10 MMHG
BH CV ECHO MEAS - RVSP: 23.2 MMHG
BH CV ECHO MEAS - SI(MOD-SP4): 39.7 ML/M^2
BH CV ECHO MEAS - SV(MOD-SP4): 60 ML
BH CV ECHO MEAS - TR MAX VEL: 181.9 CM/SEC
GLUCOSE BLDC GLUCOMTR-MCNC: 201 MG/DL (ref 70–130)
GRAM STN SPEC: ABNORMAL
HCT VFR BLD AUTO: 33.3 % (ref 34–46.6)
HGB BLD-MCNC: 10.1 G/DL (ref 12–15.9)
ISOLATED FROM: ABNORMAL
ISOLATED FROM: ABNORMAL
MAXIMAL PREDICTED HEART RATE: 139 BPM
STRESS TARGET HR: 118 BPM

## 2019-06-28 PROCEDURE — 87040 BLOOD CULTURE FOR BACTERIA: CPT | Performed by: INTERNAL MEDICINE

## 2019-06-28 PROCEDURE — 43246 EGD PLACE GASTROSTOMY TUBE: CPT | Performed by: SURGERY

## 2019-06-28 PROCEDURE — 85018 HEMOGLOBIN: CPT | Performed by: SURGERY

## 2019-06-28 PROCEDURE — 93325 DOPPLER ECHO COLOR FLOW MAPG: CPT

## 2019-06-28 PROCEDURE — 25010000002 CEFTRIAXONE: Performed by: INTERNAL MEDICINE

## 2019-06-28 PROCEDURE — 93321 DOPPLER ECHO F-UP/LMTD STD: CPT

## 2019-06-28 PROCEDURE — 82962 GLUCOSE BLOOD TEST: CPT

## 2019-06-28 PROCEDURE — 93308 TTE F-UP OR LMTD: CPT | Performed by: INTERNAL MEDICINE

## 2019-06-28 PROCEDURE — 93321 DOPPLER ECHO F-UP/LMTD STD: CPT | Performed by: INTERNAL MEDICINE

## 2019-06-28 PROCEDURE — 93308 TTE F-UP OR LMTD: CPT

## 2019-06-28 PROCEDURE — 85014 HEMATOCRIT: CPT | Performed by: SURGERY

## 2019-06-28 PROCEDURE — 0DH63UZ INSERTION OF FEEDING DEVICE INTO STOMACH, PERCUTANEOUS APPROACH: ICD-10-PCS | Performed by: SURGERY

## 2019-06-28 PROCEDURE — 25010000002 PROPOFOL 10 MG/ML EMULSION: Performed by: NURSE ANESTHETIST, CERTIFIED REGISTERED

## 2019-06-28 PROCEDURE — 93325 DOPPLER ECHO COLOR FLOW MAPG: CPT | Performed by: INTERNAL MEDICINE

## 2019-06-28 PROCEDURE — 25010000002 MORPHINE PER 10 MG: Performed by: HOSPITALIST

## 2019-06-28 PROCEDURE — 99233 SBSQ HOSP IP/OBS HIGH 50: CPT | Performed by: INTERNAL MEDICINE

## 2019-06-28 RX ORDER — SODIUM CHLORIDE 0.9 % (FLUSH) 0.9 %
3 SYRINGE (ML) INJECTION EVERY 12 HOURS SCHEDULED
Status: DISCONTINUED | OUTPATIENT
Start: 2019-06-28 | End: 2019-06-28 | Stop reason: HOSPADM

## 2019-06-28 RX ORDER — SODIUM CHLORIDE, SODIUM LACTATE, POTASSIUM CHLORIDE, CALCIUM CHLORIDE 600; 310; 30; 20 MG/100ML; MG/100ML; MG/100ML; MG/100ML
125 INJECTION, SOLUTION INTRAVENOUS CONTINUOUS
Status: DISCONTINUED | OUTPATIENT
Start: 2019-06-28 | End: 2019-06-28

## 2019-06-28 RX ORDER — ONDANSETRON 2 MG/ML
4 INJECTION INTRAMUSCULAR; INTRAVENOUS ONCE AS NEEDED
Status: DISCONTINUED | OUTPATIENT
Start: 2019-06-28 | End: 2019-06-28 | Stop reason: HOSPADM

## 2019-06-28 RX ORDER — FENTANYL CITRATE 50 UG/ML
50 INJECTION, SOLUTION INTRAMUSCULAR; INTRAVENOUS
Status: DISCONTINUED | OUTPATIENT
Start: 2019-06-28 | End: 2019-06-28 | Stop reason: HOSPADM

## 2019-06-28 RX ORDER — SODIUM HYPOCHLORITE 1.25 MG/ML
SOLUTION TOPICAL 2 TIMES DAILY
Status: DISCONTINUED | OUTPATIENT
Start: 2019-06-28 | End: 2019-07-10 | Stop reason: HOSPADM

## 2019-06-28 RX ORDER — LIDOCAINE HYDROCHLORIDE 20 MG/ML
INJECTION, SOLUTION EPIDURAL; INFILTRATION; INTRACAUDAL; PERINEURAL AS NEEDED
Status: DISCONTINUED | OUTPATIENT
Start: 2019-06-28 | End: 2019-06-28 | Stop reason: SURG

## 2019-06-28 RX ORDER — SODIUM CHLORIDE 0.9 % (FLUSH) 0.9 %
3-10 SYRINGE (ML) INJECTION AS NEEDED
Status: DISCONTINUED | OUTPATIENT
Start: 2019-06-28 | End: 2019-06-28 | Stop reason: HOSPADM

## 2019-06-28 RX ORDER — PROPOFOL 10 MG/ML
VIAL (ML) INTRAVENOUS AS NEEDED
Status: DISCONTINUED | OUTPATIENT
Start: 2019-06-28 | End: 2019-06-28 | Stop reason: SURG

## 2019-06-28 RX ORDER — IPRATROPIUM BROMIDE AND ALBUTEROL SULFATE 2.5; .5 MG/3ML; MG/3ML
3 SOLUTION RESPIRATORY (INHALATION) ONCE AS NEEDED
Status: DISCONTINUED | OUTPATIENT
Start: 2019-06-28 | End: 2019-06-28 | Stop reason: HOSPADM

## 2019-06-28 RX ADMIN — PROPOFOL 10 MG: 10 INJECTION, EMULSION INTRAVENOUS at 17:17

## 2019-06-28 RX ADMIN — MORPHINE SULFATE 1 MG: 2 INJECTION, SOLUTION INTRAMUSCULAR; INTRAVENOUS at 05:03

## 2019-06-28 RX ADMIN — PROPOFOL 20 MG: 10 INJECTION, EMULSION INTRAVENOUS at 17:38

## 2019-06-28 RX ADMIN — CARBAMAZEPINE 200 MG: 200 TABLET ORAL at 20:13

## 2019-06-28 RX ADMIN — PROPOFOL 20 MG: 10 INJECTION, EMULSION INTRAVENOUS at 17:33

## 2019-06-28 RX ADMIN — PROPOFOL 50 MG: 10 INJECTION, EMULSION INTRAVENOUS at 17:25

## 2019-06-28 RX ADMIN — PROPOFOL 50 MG: 10 INJECTION, EMULSION INTRAVENOUS at 17:29

## 2019-06-28 RX ADMIN — LIDOCAINE HYDROCHLORIDE 100 MG: 20 INJECTION, SOLUTION EPIDURAL; INFILTRATION; INTRACAUDAL; PERINEURAL at 17:11

## 2019-06-28 RX ADMIN — PROPOFOL 30 MG: 10 INJECTION, EMULSION INTRAVENOUS at 17:13

## 2019-06-28 RX ADMIN — SODIUM CHLORIDE, PRESERVATIVE FREE 3 ML: 5 INJECTION INTRAVENOUS at 07:52

## 2019-06-28 RX ADMIN — PROPOFOL 50 MG: 10 INJECTION, EMULSION INTRAVENOUS at 17:21

## 2019-06-28 RX ADMIN — SODIUM HYPOCHLORITE: 1.25 SOLUTION TOPICAL at 20:13

## 2019-06-28 RX ADMIN — MIRTAZAPINE 15 MG: 15 TABLET, FILM COATED ORAL at 20:13

## 2019-06-28 RX ADMIN — CEFTRIAXONE 2 G: 2 INJECTION, POWDER, FOR SOLUTION INTRAMUSCULAR; INTRAVENOUS at 07:51

## 2019-06-28 NOTE — ANESTHESIA POSTPROCEDURE EVALUATION
Patient: Mary Lou Star    Procedure Summary     Date:  06/28/19 Room / Location:   [facility] OR 09 /  [facility] OR    Anesthesia Start:  1711 Anesthesia Stop:  1755    Procedure:  ESOPHAGOGASTRODUODENOSCOPY WITH PERCUTANEOUS ENDOSCOPIC GASTROSTOMY TUBE (N/A Esophagus) Diagnosis:       MRSA bacteremia      (MRSA bacteremia [R78.81])    Surgeon:  Imelda Sullivan MD Provider:  Seth Nieto MD    Anesthesia Type:  general ASA Status:  3          Anesthesia Type: general  Last vitals  BP   143/64 (06/28/19 1802)   Temp   99 °F (37.2 °C) (06/28/19 1757)   Pulse   70 (06/28/19 1802)   Resp   20 (06/28/19 1802)     SpO2   94 % (06/28/19 1802)     Post Anesthesia Care and Evaluation    Patient location during evaluation: PACU  Patient participation: complete - patient participated  Level of consciousness: awake and alert  Pain score: 1  Pain management: adequate  Airway patency: patent  Anesthetic complications: No anesthetic complications  PONV Status: controlled  Cardiovascular status: acceptable  Respiratory status: acceptable  Hydration status: acceptable

## 2019-06-29 LAB
BACTERIA SPEC AEROBE CULT: ABNORMAL
HCT VFR BLD AUTO: 33.8 % (ref 34–46.6)
HGB BLD-MCNC: 11 G/DL (ref 12–15.9)

## 2019-06-29 PROCEDURE — 85018 HEMOGLOBIN: CPT | Performed by: SURGERY

## 2019-06-29 PROCEDURE — 85014 HEMATOCRIT: CPT | Performed by: SURGERY

## 2019-06-29 PROCEDURE — 94799 UNLISTED PULMONARY SVC/PX: CPT

## 2019-06-29 PROCEDURE — 99233 SBSQ HOSP IP/OBS HIGH 50: CPT | Performed by: INTERNAL MEDICINE

## 2019-06-29 PROCEDURE — 25010000002 CEFTRIAXONE: Performed by: INTERNAL MEDICINE

## 2019-06-29 PROCEDURE — 25010000002 MORPHINE PER 10 MG: Performed by: HOSPITALIST

## 2019-06-29 PROCEDURE — 99024 POSTOP FOLLOW-UP VISIT: CPT | Performed by: SURGERY

## 2019-06-29 PROCEDURE — 25010000002 VANCOMYCIN 5 G RECONSTITUTED SOLUTION 5,000 MG VIAL: Performed by: HOSPITALIST

## 2019-06-29 PROCEDURE — 25010000002 CYANOCOBALAMIN PER 1000 MCG: Performed by: SURGERY

## 2019-06-29 RX ADMIN — Medication 1 TABLET: at 08:37

## 2019-06-29 RX ADMIN — DABIGATRAN ETEXILATE MESYLATE 150 MG: 150 CAPSULE ORAL at 19:59

## 2019-06-29 RX ADMIN — SODIUM HYPOCHLORITE: 1.25 SOLUTION TOPICAL at 20:00

## 2019-06-29 RX ADMIN — VANCOMYCIN HYDROCHLORIDE 750 MG: 5 INJECTION, POWDER, LYOPHILIZED, FOR SOLUTION INTRAVENOUS at 10:34

## 2019-06-29 RX ADMIN — FERROUS SULFATE TAB 325 MG (65 MG ELEMENTAL FE) 325 MG: 325 (65 FE) TAB at 16:47

## 2019-06-29 RX ADMIN — CARBAMAZEPINE 200 MG: 200 TABLET ORAL at 08:37

## 2019-06-29 RX ADMIN — MORPHINE SULFATE 1 MG: 2 INJECTION, SOLUTION INTRAMUSCULAR; INTRAVENOUS at 15:54

## 2019-06-29 RX ADMIN — FERROUS SULFATE TAB 325 MG (65 MG ELEMENTAL FE) 325 MG: 325 (65 FE) TAB at 08:37

## 2019-06-29 RX ADMIN — DABIGATRAN ETEXILATE MESYLATE 150 MG: 150 CAPSULE ORAL at 08:37

## 2019-06-29 RX ADMIN — MORPHINE SULFATE 1 MG: 2 INJECTION, SOLUTION INTRAMUSCULAR; INTRAVENOUS at 22:44

## 2019-06-29 RX ADMIN — MORPHINE SULFATE 1 MG: 2 INJECTION, SOLUTION INTRAMUSCULAR; INTRAVENOUS at 00:12

## 2019-06-29 RX ADMIN — LEVOTHYROXINE SODIUM 25 MCG: 25 TABLET ORAL at 05:50

## 2019-06-29 RX ADMIN — MIRTAZAPINE 15 MG: 15 TABLET, FILM COATED ORAL at 20:00

## 2019-06-29 RX ADMIN — HYDROCODONE BITARTRATE AND ACETAMINOPHEN 1 TABLET: 5; 325 TABLET ORAL at 09:38

## 2019-06-29 RX ADMIN — SODIUM HYPOCHLORITE 1 ML: 1.25 SOLUTION TOPICAL at 08:36

## 2019-06-29 RX ADMIN — OFLOXACIN 50000 UNITS: 300 TABLET, COATED ORAL at 08:37

## 2019-06-29 RX ADMIN — CYANOCOBALAMIN 1000 MCG: 1000 INJECTION, SOLUTION INTRAMUSCULAR at 08:37

## 2019-06-29 RX ADMIN — ASPIRIN 81 MG: 81 TABLET, CHEWABLE ORAL at 08:37

## 2019-06-29 RX ADMIN — CEFTRIAXONE 2 G: 2 INJECTION, POWDER, FOR SOLUTION INTRAMUSCULAR; INTRAVENOUS at 08:56

## 2019-06-29 RX ADMIN — CARBAMAZEPINE 200 MG: 200 TABLET ORAL at 20:00

## 2019-06-29 RX ADMIN — Medication 1 TABLET: at 16:47

## 2019-06-29 RX ADMIN — HYDROCODONE BITARTRATE AND ACETAMINOPHEN 1 TABLET: 5; 325 TABLET ORAL at 20:00

## 2019-06-30 ENCOUNTER — APPOINTMENT (OUTPATIENT)
Dept: CT IMAGING | Facility: HOSPITAL | Age: 84
End: 2019-06-30

## 2019-06-30 LAB
ANION GAP SERPL CALCULATED.3IONS-SCNC: 12.3 MMOL/L (ref 5–15)
BUN BLD-MCNC: 22 MG/DL (ref 8–23)
BUN/CREAT SERPL: 52.4 (ref 7–25)
CALCIUM SPEC-SCNC: 8.3 MG/DL (ref 8.6–10.5)
CHLORIDE SERPL-SCNC: 105 MMOL/L (ref 98–107)
CO2 SERPL-SCNC: 21.7 MMOL/L (ref 22–29)
CREAT BLD-MCNC: 0.42 MG/DL (ref 0.57–1)
GFR SERPL CREATININE-BSD FRML MDRD: 145 ML/MIN/1.73
GLUCOSE BLD-MCNC: 97 MG/DL (ref 65–99)
POTASSIUM BLD-SCNC: 3.7 MMOL/L (ref 3.5–5.2)
SODIUM BLD-SCNC: 139 MMOL/L (ref 136–145)

## 2019-06-30 PROCEDURE — 25010000002 VANCOMYCIN 5 G RECONSTITUTED SOLUTION 5,000 MG VIAL: Performed by: HOSPITALIST

## 2019-06-30 PROCEDURE — 74177 CT ABD & PELVIS W/CONTRAST: CPT

## 2019-06-30 PROCEDURE — 99024 POSTOP FOLLOW-UP VISIT: CPT | Performed by: SURGERY

## 2019-06-30 PROCEDURE — 74177 CT ABD & PELVIS W/CONTRAST: CPT | Performed by: RADIOLOGY

## 2019-06-30 PROCEDURE — 25010000002 CEFTRIAXONE: Performed by: INTERNAL MEDICINE

## 2019-06-30 PROCEDURE — 25010000002 MORPHINE PER 10 MG: Performed by: HOSPITALIST

## 2019-06-30 PROCEDURE — 99232 SBSQ HOSP IP/OBS MODERATE 35: CPT | Performed by: HOSPITALIST

## 2019-06-30 PROCEDURE — 25010000003 MICAFUNGIN SODIUM 100 MG RECONSTITUTED SOLUTION 1 EACH VIAL: Performed by: PHYSICIAN ASSISTANT

## 2019-06-30 PROCEDURE — 80048 BASIC METABOLIC PNL TOTAL CA: CPT | Performed by: HOSPITALIST

## 2019-06-30 PROCEDURE — 0 IOVERSOL 68 % SOLUTION: Performed by: HOSPITALIST

## 2019-06-30 RX ORDER — CLINDAMYCIN PHOSPHATE 900 MG/50ML
900 INJECTION INTRAVENOUS EVERY 8 HOURS
Status: DISCONTINUED | OUTPATIENT
Start: 2019-06-30 | End: 2019-07-03

## 2019-06-30 RX ORDER — LACTULOSE 10 G/15ML
30 SOLUTION ORAL DAILY
Status: COMPLETED | OUTPATIENT
Start: 2019-06-30 | End: 2019-06-30

## 2019-06-30 RX ORDER — SENNA AND DOCUSATE SODIUM 50; 8.6 MG/1; MG/1
2 TABLET, FILM COATED ORAL NIGHTLY
Status: DISCONTINUED | OUTPATIENT
Start: 2019-06-30 | End: 2019-07-10 | Stop reason: HOSPADM

## 2019-06-30 RX ADMIN — CLINDAMYCIN IN 5 PERCENT DEXTROSE 900 MG: 18 INJECTION, SOLUTION INTRAVENOUS at 23:51

## 2019-06-30 RX ADMIN — CARBAMAZEPINE 200 MG: 200 TABLET ORAL at 20:22

## 2019-06-30 RX ADMIN — SODIUM CHLORIDE, PRESERVATIVE FREE 3 ML: 5 INJECTION INTRAVENOUS at 08:58

## 2019-06-30 RX ADMIN — FERROUS SULFATE TAB 325 MG (65 MG ELEMENTAL FE) 325 MG: 325 (65 FE) TAB at 07:36

## 2019-06-30 RX ADMIN — MORPHINE SULFATE 1 MG: 2 INJECTION, SOLUTION INTRAMUSCULAR; INTRAVENOUS at 12:10

## 2019-06-30 RX ADMIN — VANCOMYCIN HYDROCHLORIDE 750 MG: 5 INJECTION, POWDER, LYOPHILIZED, FOR SOLUTION INTRAVENOUS at 10:06

## 2019-06-30 RX ADMIN — IOVERSOL 90 ML: 678 INJECTION INTRA-ARTERIAL; INTRAVENOUS at 11:00

## 2019-06-30 RX ADMIN — CEFTRIAXONE 2 G: 2 INJECTION, POWDER, FOR SOLUTION INTRAMUSCULAR; INTRAVENOUS at 07:50

## 2019-06-30 RX ADMIN — Medication 1 TABLET: at 07:36

## 2019-06-30 RX ADMIN — LACTULOSE 30 G: 10 SOLUTION ORAL at 13:22

## 2019-06-30 RX ADMIN — CARBAMAZEPINE 200 MG: 200 TABLET ORAL at 07:36

## 2019-06-30 RX ADMIN — Medication 1 TABLET: at 13:23

## 2019-06-30 RX ADMIN — SODIUM HYPOCHLORITE: 1.25 SOLUTION TOPICAL at 07:37

## 2019-06-30 RX ADMIN — MICAFUNGIN SODIUM 100 MG: 20 INJECTION, POWDER, LYOPHILIZED, FOR SOLUTION INTRAVENOUS at 16:57

## 2019-06-30 RX ADMIN — MORPHINE SULFATE 1 MG: 2 INJECTION, SOLUTION INTRAMUSCULAR; INTRAVENOUS at 05:20

## 2019-06-30 RX ADMIN — POLYETHYLENE GLYCOL (3350) 17 G: 17 POWDER, FOR SOLUTION ORAL at 13:22

## 2019-06-30 RX ADMIN — FERROUS SULFATE TAB 325 MG (65 MG ELEMENTAL FE) 325 MG: 325 (65 FE) TAB at 13:22

## 2019-06-30 RX ADMIN — DABIGATRAN ETEXILATE MESYLATE 150 MG: 150 CAPSULE ORAL at 07:36

## 2019-06-30 RX ADMIN — SODIUM HYPOCHLORITE: 1.25 SOLUTION TOPICAL at 20:22

## 2019-06-30 RX ADMIN — CLINDAMYCIN IN 5 PERCENT DEXTROSE 900 MG: 18 INJECTION, SOLUTION INTRAVENOUS at 16:51

## 2019-06-30 RX ADMIN — LEVOTHYROXINE SODIUM 25 MCG: 25 TABLET ORAL at 05:17

## 2019-06-30 RX ADMIN — MORPHINE SULFATE 1 MG: 2 INJECTION, SOLUTION INTRAMUSCULAR; INTRAVENOUS at 15:56

## 2019-06-30 RX ADMIN — HYDROCODONE BITARTRATE AND ACETAMINOPHEN 1 TABLET: 5; 325 TABLET ORAL at 07:59

## 2019-06-30 RX ADMIN — ASPIRIN 81 MG: 81 TABLET, CHEWABLE ORAL at 07:36

## 2019-06-30 RX ADMIN — MIRTAZAPINE 15 MG: 15 TABLET, FILM COATED ORAL at 20:22

## 2019-06-30 RX ADMIN — SENNOSIDES AND DOCUSATE SODIUM 2 TABLET: 8.6; 5 TABLET ORAL at 20:22

## 2019-06-30 RX ADMIN — HYDROCODONE BITARTRATE AND ACETAMINOPHEN 1 TABLET: 5; 325 TABLET ORAL at 20:22

## 2019-07-01 LAB
BACTERIA SPEC AEROBE CULT: NORMAL
BACTERIA SPEC AEROBE CULT: NORMAL
BASOPHILS # BLD AUTO: 0.01 10*3/MM3 (ref 0–0.2)
BASOPHILS NFR BLD AUTO: 0.1 % (ref 0–1.5)
CRP SERPL-MCNC: 2.18 MG/DL (ref 0–0.5)
DEPRECATED RDW RBC AUTO: 46.2 FL (ref 37–54)
EOSINOPHIL # BLD AUTO: 0.13 10*3/MM3 (ref 0–0.4)
EOSINOPHIL NFR BLD AUTO: 1.9 % (ref 0.3–6.2)
ERYTHROCYTE [DISTWIDTH] IN BLOOD BY AUTOMATED COUNT: 14.7 % (ref 12.3–15.4)
HCT VFR BLD AUTO: 37.4 % (ref 34–46.6)
HGB BLD-MCNC: 12.2 G/DL (ref 12–15.9)
IMM GRANULOCYTES # BLD AUTO: 0.03 10*3/MM3 (ref 0–0.05)
IMM GRANULOCYTES NFR BLD AUTO: 0.4 % (ref 0–0.5)
LAB AP CASE REPORT: NORMAL
LYMPHOCYTES # BLD AUTO: 0.64 10*3/MM3 (ref 0.7–3.1)
LYMPHOCYTES NFR BLD AUTO: 9.4 % (ref 19.6–45.3)
MCH RBC QN AUTO: 27.8 PG (ref 26.6–33)
MCHC RBC AUTO-ENTMCNC: 32.6 G/DL (ref 31.5–35.7)
MCV RBC AUTO: 85.2 FL (ref 79–97)
MONOCYTES # BLD AUTO: 0.62 10*3/MM3 (ref 0.1–0.9)
MONOCYTES NFR BLD AUTO: 9.1 % (ref 5–12)
NEUTROPHILS # BLD AUTO: 5.41 10*3/MM3 (ref 1.7–7)
NEUTROPHILS NFR BLD AUTO: 79.1 % (ref 42.7–76)
PATH REPORT.FINAL DX SPEC: NORMAL
PLATELET # BLD AUTO: 188 10*3/MM3 (ref 140–450)
PMV BLD AUTO: 9.5 FL (ref 6–12)
RBC # BLD AUTO: 4.39 10*6/MM3 (ref 3.77–5.28)
VANCOMYCIN TROUGH SERPL-MCNC: 7.9 MCG/ML (ref 5–20)
WBC NRBC COR # BLD: 6.84 10*3/MM3 (ref 3.4–10.8)

## 2019-07-01 PROCEDURE — 94799 UNLISTED PULMONARY SVC/PX: CPT

## 2019-07-01 PROCEDURE — 85025 COMPLETE CBC W/AUTO DIFF WBC: CPT | Performed by: PHYSICIAN ASSISTANT

## 2019-07-01 PROCEDURE — 25010000003 MICAFUNGIN SODIUM 100 MG RECONSTITUTED SOLUTION 1 EACH VIAL: Performed by: PHYSICIAN ASSISTANT

## 2019-07-01 PROCEDURE — 93005 ELECTROCARDIOGRAM TRACING: CPT | Performed by: NURSE PRACTITIONER

## 2019-07-01 PROCEDURE — 86140 C-REACTIVE PROTEIN: CPT | Performed by: PHYSICIAN ASSISTANT

## 2019-07-01 PROCEDURE — 99222 1ST HOSP IP/OBS MODERATE 55: CPT | Performed by: INTERNAL MEDICINE

## 2019-07-01 PROCEDURE — 25010000002 VANCOMYCIN 5 G RECONSTITUTED SOLUTION 5,000 MG VIAL: Performed by: HOSPITALIST

## 2019-07-01 PROCEDURE — 99233 SBSQ HOSP IP/OBS HIGH 50: CPT | Performed by: HOSPITALIST

## 2019-07-01 PROCEDURE — 99024 POSTOP FOLLOW-UP VISIT: CPT | Performed by: SURGERY

## 2019-07-01 PROCEDURE — 80202 ASSAY OF VANCOMYCIN: CPT | Performed by: HOSPITALIST

## 2019-07-01 RX ADMIN — LEVOTHYROXINE SODIUM 25 MCG: 25 TABLET ORAL at 05:33

## 2019-07-01 RX ADMIN — Medication 1 TABLET: at 08:27

## 2019-07-01 RX ADMIN — SENNOSIDES AND DOCUSATE SODIUM 2 TABLET: 8.6; 5 TABLET ORAL at 20:59

## 2019-07-01 RX ADMIN — SODIUM CHLORIDE, PRESERVATIVE FREE 3 ML: 5 INJECTION INTRAVENOUS at 08:34

## 2019-07-01 RX ADMIN — SODIUM CHLORIDE, PRESERVATIVE FREE 3 ML: 5 INJECTION INTRAVENOUS at 20:59

## 2019-07-01 RX ADMIN — MICAFUNGIN SODIUM 100 MG: 20 INJECTION, POWDER, LYOPHILIZED, FOR SOLUTION INTRAVENOUS at 20:58

## 2019-07-01 RX ADMIN — CARBAMAZEPINE 200 MG: 200 TABLET ORAL at 08:27

## 2019-07-01 RX ADMIN — SODIUM HYPOCHLORITE: 1.25 SOLUTION TOPICAL at 08:28

## 2019-07-01 RX ADMIN — POLYETHYLENE GLYCOL (3350) 17 G: 17 POWDER, FOR SOLUTION ORAL at 08:27

## 2019-07-01 RX ADMIN — MIRTAZAPINE 15 MG: 15 TABLET, FILM COATED ORAL at 20:58

## 2019-07-01 RX ADMIN — SODIUM HYPOCHLORITE: 1.25 SOLUTION TOPICAL at 22:51

## 2019-07-01 RX ADMIN — ASPIRIN 81 MG: 81 TABLET, CHEWABLE ORAL at 08:33

## 2019-07-01 RX ADMIN — Medication 1 TABLET: at 15:17

## 2019-07-01 RX ADMIN — VANCOMYCIN HYDROCHLORIDE 750 MG: 5 INJECTION, POWDER, LYOPHILIZED, FOR SOLUTION INTRAVENOUS at 12:02

## 2019-07-01 RX ADMIN — CLINDAMYCIN IN 5 PERCENT DEXTROSE 900 MG: 18 INJECTION, SOLUTION INTRAVENOUS at 22:51

## 2019-07-01 RX ADMIN — CARBAMAZEPINE 200 MG: 200 TABLET ORAL at 20:59

## 2019-07-01 RX ADMIN — DABIGATRAN ETEXILATE MESYLATE 150 MG: 150 CAPSULE ORAL at 08:27

## 2019-07-01 RX ADMIN — APIXABAN 2.5 MG: 2.5 TABLET, FILM COATED ORAL at 15:17

## 2019-07-01 RX ADMIN — APIXABAN 2.5 MG: 2.5 TABLET, FILM COATED ORAL at 20:59

## 2019-07-01 RX ADMIN — CLINDAMYCIN IN 5 PERCENT DEXTROSE 900 MG: 18 INJECTION, SOLUTION INTRAVENOUS at 17:59

## 2019-07-01 RX ADMIN — FERROUS SULFATE TAB 325 MG (65 MG ELEMENTAL FE) 325 MG: 325 (65 FE) TAB at 08:27

## 2019-07-01 RX ADMIN — FERROUS SULFATE TAB 325 MG (65 MG ELEMENTAL FE) 325 MG: 325 (65 FE) TAB at 15:17

## 2019-07-01 RX ADMIN — CLINDAMYCIN IN 5 PERCENT DEXTROSE 900 MG: 18 INJECTION, SOLUTION INTRAVENOUS at 08:27

## 2019-07-01 RX ADMIN — HYDROCODONE BITARTRATE AND ACETAMINOPHEN 1 TABLET: 5; 325 TABLET ORAL at 08:27

## 2019-07-02 ENCOUNTER — ANESTHESIA (OUTPATIENT)
Dept: CARDIOLOGY | Facility: HOSPITAL | Age: 84
End: 2019-07-02

## 2019-07-02 ENCOUNTER — APPOINTMENT (OUTPATIENT)
Dept: CT IMAGING | Facility: HOSPITAL | Age: 84
End: 2019-07-02

## 2019-07-02 ENCOUNTER — APPOINTMENT (OUTPATIENT)
Dept: CARDIOLOGY | Facility: HOSPITAL | Age: 84
End: 2019-07-02

## 2019-07-02 ENCOUNTER — ANESTHESIA EVENT (OUTPATIENT)
Dept: CARDIOLOGY | Facility: HOSPITAL | Age: 84
End: 2019-07-02

## 2019-07-02 VITALS — SYSTOLIC BLOOD PRESSURE: 106 MMHG | TEMPERATURE: 98 F | DIASTOLIC BLOOD PRESSURE: 54 MMHG | OXYGEN SATURATION: 100 %

## 2019-07-02 PROCEDURE — 0 IOVERSOL 68 % SOLUTION: Performed by: HOSPITALIST

## 2019-07-02 PROCEDURE — 93325 DOPPLER ECHO COLOR FLOW MAPG: CPT

## 2019-07-02 PROCEDURE — 71260 CT THORAX DX C+: CPT | Performed by: RADIOLOGY

## 2019-07-02 PROCEDURE — 93312 ECHO TRANSESOPHAGEAL: CPT

## 2019-07-02 PROCEDURE — 25010000002 PROPOFOL 10 MG/ML EMULSION: Performed by: NURSE ANESTHETIST, CERTIFIED REGISTERED

## 2019-07-02 PROCEDURE — 99232 SBSQ HOSP IP/OBS MODERATE 35: CPT | Performed by: HOSPITALIST

## 2019-07-02 PROCEDURE — 25010000002 VANCOMYCIN 5 G RECONSTITUTED SOLUTION 5,000 MG VIAL: Performed by: HOSPITALIST

## 2019-07-02 PROCEDURE — B24BZZ4 ULTRASONOGRAPHY OF HEART WITH AORTA, TRANSESOPHAGEAL: ICD-10-PCS | Performed by: INTERNAL MEDICINE

## 2019-07-02 PROCEDURE — 99231 SBSQ HOSP IP/OBS SF/LOW 25: CPT | Performed by: NURSE PRACTITIONER

## 2019-07-02 PROCEDURE — 93325 DOPPLER ECHO COLOR FLOW MAPG: CPT | Performed by: INTERNAL MEDICINE

## 2019-07-02 PROCEDURE — 94799 UNLISTED PULMONARY SVC/PX: CPT

## 2019-07-02 PROCEDURE — 25010000003 MICAFUNGIN SODIUM 100 MG RECONSTITUTED SOLUTION 1 EACH VIAL: Performed by: PHYSICIAN ASSISTANT

## 2019-07-02 PROCEDURE — 71260 CT THORAX DX C+: CPT

## 2019-07-02 PROCEDURE — 93312 ECHO TRANSESOPHAGEAL: CPT | Performed by: INTERNAL MEDICINE

## 2019-07-02 RX ORDER — SODIUM CHLORIDE 9 MG/ML
INJECTION, SOLUTION INTRAVENOUS CONTINUOUS PRN
Status: DISCONTINUED | OUTPATIENT
Start: 2019-07-02 | End: 2019-07-02 | Stop reason: SURG

## 2019-07-02 RX ORDER — PROPOFOL 10 MG/ML
VIAL (ML) INTRAVENOUS AS NEEDED
Status: DISCONTINUED | OUTPATIENT
Start: 2019-07-02 | End: 2019-07-02 | Stop reason: SURG

## 2019-07-02 RX ADMIN — ASPIRIN 81 MG: 81 TABLET, CHEWABLE ORAL at 08:30

## 2019-07-02 RX ADMIN — IOVERSOL 100 ML: 678 INJECTION INTRA-ARTERIAL; INTRAVENOUS at 14:00

## 2019-07-02 RX ADMIN — FERROUS SULFATE TAB 325 MG (65 MG ELEMENTAL FE) 325 MG: 325 (65 FE) TAB at 08:30

## 2019-07-02 RX ADMIN — FERROUS SULFATE TAB 325 MG (65 MG ELEMENTAL FE) 325 MG: 325 (65 FE) TAB at 15:13

## 2019-07-02 RX ADMIN — SODIUM CHLORIDE: 9 INJECTION, SOLUTION INTRAVENOUS at 09:33

## 2019-07-02 RX ADMIN — MIRTAZAPINE 15 MG: 15 TABLET, FILM COATED ORAL at 20:59

## 2019-07-02 RX ADMIN — SENNOSIDES AND DOCUSATE SODIUM 2 TABLET: 8.6; 5 TABLET ORAL at 20:59

## 2019-07-02 RX ADMIN — MICAFUNGIN SODIUM 100 MG: 20 INJECTION, POWDER, LYOPHILIZED, FOR SOLUTION INTRAVENOUS at 16:58

## 2019-07-02 RX ADMIN — Medication 1 TABLET: at 15:12

## 2019-07-02 RX ADMIN — VANCOMYCIN HYDROCHLORIDE 750 MG: 5 INJECTION, POWDER, LYOPHILIZED, FOR SOLUTION INTRAVENOUS at 05:07

## 2019-07-02 RX ADMIN — CARBAMAZEPINE 200 MG: 200 TABLET ORAL at 08:30

## 2019-07-02 RX ADMIN — APIXABAN 2.5 MG: 2.5 TABLET, FILM COATED ORAL at 20:59

## 2019-07-02 RX ADMIN — Medication 1 TABLET: at 08:30

## 2019-07-02 RX ADMIN — CLINDAMYCIN IN 5 PERCENT DEXTROSE 900 MG: 18 INJECTION, SOLUTION INTRAVENOUS at 10:00

## 2019-07-02 RX ADMIN — CARBAMAZEPINE 200 MG: 200 TABLET ORAL at 20:59

## 2019-07-02 RX ADMIN — APIXABAN 2.5 MG: 2.5 TABLET, FILM COATED ORAL at 08:30

## 2019-07-02 RX ADMIN — SODIUM HYPOCHLORITE: 1.25 SOLUTION TOPICAL at 08:34

## 2019-07-02 RX ADMIN — PROPOFOL 100 MG: 10 INJECTION, EMULSION INTRAVENOUS at 09:39

## 2019-07-02 RX ADMIN — CLINDAMYCIN IN 5 PERCENT DEXTROSE 900 MG: 18 INJECTION, SOLUTION INTRAVENOUS at 15:11

## 2019-07-02 RX ADMIN — SODIUM HYPOCHLORITE: 1.25 SOLUTION TOPICAL at 20:59

## 2019-07-02 RX ADMIN — SODIUM CHLORIDE, PRESERVATIVE FREE 3 ML: 5 INJECTION INTRAVENOUS at 20:58

## 2019-07-02 RX ADMIN — SODIUM CHLORIDE, PRESERVATIVE FREE 3 ML: 5 INJECTION INTRAVENOUS at 08:34

## 2019-07-02 RX ADMIN — HYDROCODONE BITARTRATE AND ACETAMINOPHEN 1 TABLET: 5; 325 TABLET ORAL at 21:26

## 2019-07-02 NOTE — ANESTHESIA POSTPROCEDURE EVALUATION
Patient: Mary Lou Graves    Procedure Summary     Date:  07/02/19 Room / Location:  [facility] NONINVASIVE LAB    Anesthesia Start:  0933 Anesthesia Stop:  0945    Procedure:  ADULT TRANSESOPHAGEAL ECHO (SHANE) W/ CONT IF NECESSARY PER PROTOCOL Diagnosis:  (Endocarditis)    Scheduled Providers:  Javier Oro MD Provider:  Seth Nieto MD    Anesthesia Type:  general ASA Status:  3          Anesthesia Type: general  Last vitals  BP   119/63(rn johnny said one set of vitals would be fine. pt in and out) (07/02/19 1017)   Temp   96.7 °F (35.9 °C) (07/02/19 1017)   Pulse   69 (07/02/19 1017)   Resp   18 (07/02/19 1017)     SpO2   99 % (07/02/19 1017)     Post Anesthesia Care and Evaluation    Patient location during evaluation: bedside  Patient participation: complete - patient participated  Level of consciousness: awake and alert  Pain score: 1  Pain management: adequate  Airway patency: patent  Anesthetic complications: No anesthetic complications  PONV Status: none  Cardiovascular status: acceptable  Respiratory status: acceptable  Hydration status: acceptable

## 2019-07-03 LAB
BACTERIA SPEC AEROBE CULT: NORMAL
BH CV ECHO MEAS - BSA(HAYCOCK): 1.4 M^2
BH CV ECHO MEAS - BSA: 1.4 M^2
BH CV ECHO MEAS - BZI_BMI: 14.5 KILOGRAMS/M^2
BH CV ECHO MEAS - BZI_METRIC_HEIGHT: 167.6 CM
BH CV ECHO MEAS - BZI_METRIC_WEIGHT: 40.8 KG
CRP SERPL-MCNC: 2.03 MG/DL (ref 0–0.5)

## 2019-07-03 PROCEDURE — 36415 COLL VENOUS BLD VENIPUNCTURE: CPT

## 2019-07-03 PROCEDURE — 25010000002 VANCOMYCIN 5 G RECONSTITUTED SOLUTION 5,000 MG VIAL: Performed by: HOSPITALIST

## 2019-07-03 PROCEDURE — 94799 UNLISTED PULMONARY SVC/PX: CPT

## 2019-07-03 PROCEDURE — C1751 CATH, INF, PER/CENT/MIDLINE: HCPCS

## 2019-07-03 PROCEDURE — 99233 SBSQ HOSP IP/OBS HIGH 50: CPT | Performed by: HOSPITALIST

## 2019-07-03 PROCEDURE — 25010000003 MICAFUNGIN SODIUM 100 MG RECONSTITUTED SOLUTION 1 EACH VIAL: Performed by: PHYSICIAN ASSISTANT

## 2019-07-03 PROCEDURE — 86140 C-REACTIVE PROTEIN: CPT | Performed by: PHYSICIAN ASSISTANT

## 2019-07-03 PROCEDURE — 25010000002 MORPHINE PER 10 MG: Performed by: HOSPITALIST

## 2019-07-03 RX ORDER — MORPHINE SULFATE 2 MG/ML
2 INJECTION, SOLUTION INTRAMUSCULAR; INTRAVENOUS EVERY 4 HOURS PRN
Status: COMPLETED | OUTPATIENT
Start: 2019-07-03 | End: 2019-07-03

## 2019-07-03 RX ORDER — SODIUM CHLORIDE 0.9 % (FLUSH) 0.9 %
10 SYRINGE (ML) INJECTION AS NEEDED
Status: DISCONTINUED | OUTPATIENT
Start: 2019-07-03 | End: 2019-07-10 | Stop reason: HOSPADM

## 2019-07-03 RX ORDER — SODIUM CHLORIDE 0.9 % (FLUSH) 0.9 %
10 SYRINGE (ML) INJECTION EVERY 12 HOURS SCHEDULED
Status: DISCONTINUED | OUTPATIENT
Start: 2019-07-03 | End: 2019-07-10 | Stop reason: HOSPADM

## 2019-07-03 RX ORDER — HYDROCODONE BITARTRATE AND ACETAMINOPHEN 5; 325 MG/1; MG/1
1 TABLET ORAL EVERY 8 HOURS SCHEDULED
Status: DISCONTINUED | OUTPATIENT
Start: 2019-07-03 | End: 2019-07-10 | Stop reason: HOSPADM

## 2019-07-03 RX ADMIN — HYDROCODONE BITARTRATE AND ACETAMINOPHEN 1 TABLET: 5; 325 TABLET ORAL at 08:42

## 2019-07-03 RX ADMIN — LEVOTHYROXINE SODIUM 25 MCG: 25 TABLET ORAL at 06:18

## 2019-07-03 RX ADMIN — FERROUS SULFATE TAB 325 MG (65 MG ELEMENTAL FE) 325 MG: 325 (65 FE) TAB at 17:01

## 2019-07-03 RX ADMIN — CARBAMAZEPINE 200 MG: 200 TABLET ORAL at 20:04

## 2019-07-03 RX ADMIN — CARBAMAZEPINE 200 MG: 200 TABLET ORAL at 08:42

## 2019-07-03 RX ADMIN — MIRTAZAPINE 15 MG: 15 TABLET, FILM COATED ORAL at 20:04

## 2019-07-03 RX ADMIN — FERROUS SULFATE TAB 325 MG (65 MG ELEMENTAL FE) 325 MG: 325 (65 FE) TAB at 08:42

## 2019-07-03 RX ADMIN — SODIUM CHLORIDE, PRESERVATIVE FREE 3 ML: 5 INJECTION INTRAVENOUS at 08:43

## 2019-07-03 RX ADMIN — VANCOMYCIN HYDROCHLORIDE 750 MG: 5 INJECTION, POWDER, LYOPHILIZED, FOR SOLUTION INTRAVENOUS at 18:28

## 2019-07-03 RX ADMIN — APIXABAN 2.5 MG: 2.5 TABLET, FILM COATED ORAL at 20:04

## 2019-07-03 RX ADMIN — SODIUM HYPOCHLORITE: 1.25 SOLUTION TOPICAL at 08:43

## 2019-07-03 RX ADMIN — HYDROCODONE BITARTRATE AND ACETAMINOPHEN 1 TABLET: 5; 325 TABLET ORAL at 20:04

## 2019-07-03 RX ADMIN — Medication 1 TABLET: at 17:01

## 2019-07-03 RX ADMIN — MORPHINE SULFATE 2 MG: 2 INJECTION, SOLUTION INTRAMUSCULAR; INTRAVENOUS at 09:17

## 2019-07-03 RX ADMIN — Medication 1 TABLET: at 08:42

## 2019-07-03 RX ADMIN — APIXABAN 2.5 MG: 2.5 TABLET, FILM COATED ORAL at 08:42

## 2019-07-03 RX ADMIN — CLINDAMYCIN IN 5 PERCENT DEXTROSE 900 MG: 18 INJECTION, SOLUTION INTRAVENOUS at 00:13

## 2019-07-03 RX ADMIN — MICAFUNGIN SODIUM 100 MG: 20 INJECTION, POWDER, LYOPHILIZED, FOR SOLUTION INTRAVENOUS at 17:03

## 2019-07-03 RX ADMIN — POLYETHYLENE GLYCOL (3350) 17 G: 17 POWDER, FOR SOLUTION ORAL at 08:42

## 2019-07-03 RX ADMIN — VANCOMYCIN HYDROCHLORIDE 750 MG: 5 INJECTION, POWDER, LYOPHILIZED, FOR SOLUTION INTRAVENOUS at 00:40

## 2019-07-03 RX ADMIN — ASPIRIN 81 MG: 81 TABLET, CHEWABLE ORAL at 08:42

## 2019-07-03 RX ADMIN — SENNOSIDES AND DOCUSATE SODIUM 2 TABLET: 8.6; 5 TABLET ORAL at 20:04

## 2019-07-04 LAB
ANION GAP SERPL CALCULATED.3IONS-SCNC: 11.1 MMOL/L (ref 5–15)
BASOPHILS # BLD AUTO: 0.01 10*3/MM3 (ref 0–0.2)
BASOPHILS NFR BLD AUTO: 0.2 % (ref 0–1.5)
BUN BLD-MCNC: 16 MG/DL (ref 8–23)
BUN/CREAT SERPL: 32.7 (ref 7–25)
CALCIUM SPEC-SCNC: 8.5 MG/DL (ref 8.6–10.5)
CHLORIDE SERPL-SCNC: 102 MMOL/L (ref 98–107)
CO2 SERPL-SCNC: 25.9 MMOL/L (ref 22–29)
CREAT BLD-MCNC: 0.49 MG/DL (ref 0.57–1)
CRP SERPL-MCNC: 1.12 MG/DL (ref 0–0.5)
DEPRECATED RDW RBC AUTO: 46.8 FL (ref 37–54)
EOSINOPHIL # BLD AUTO: 0.23 10*3/MM3 (ref 0–0.4)
EOSINOPHIL NFR BLD AUTO: 4.1 % (ref 0.3–6.2)
ERYTHROCYTE [DISTWIDTH] IN BLOOD BY AUTOMATED COUNT: 14.9 % (ref 12.3–15.4)
GFR SERPL CREATININE-BSD FRML MDRD: 121 ML/MIN/1.73
GLUCOSE BLD-MCNC: 102 MG/DL (ref 65–99)
HCT VFR BLD AUTO: 34.4 % (ref 34–46.6)
HGB BLD-MCNC: 10.9 G/DL (ref 12–15.9)
IMM GRANULOCYTES # BLD AUTO: 0.02 10*3/MM3 (ref 0–0.05)
IMM GRANULOCYTES NFR BLD AUTO: 0.4 % (ref 0–0.5)
LYMPHOCYTES # BLD AUTO: 0.88 10*3/MM3 (ref 0.7–3.1)
LYMPHOCYTES NFR BLD AUTO: 15.7 % (ref 19.6–45.3)
MCH RBC QN AUTO: 27.7 PG (ref 26.6–33)
MCHC RBC AUTO-ENTMCNC: 31.7 G/DL (ref 31.5–35.7)
MCV RBC AUTO: 87.3 FL (ref 79–97)
MONOCYTES # BLD AUTO: 0.58 10*3/MM3 (ref 0.1–0.9)
MONOCYTES NFR BLD AUTO: 10.3 % (ref 5–12)
NEUTROPHILS # BLD AUTO: 3.9 10*3/MM3 (ref 1.7–7)
NEUTROPHILS NFR BLD AUTO: 69.3 % (ref 42.7–76)
PLATELET # BLD AUTO: 247 10*3/MM3 (ref 140–450)
PMV BLD AUTO: 8.9 FL (ref 6–12)
POTASSIUM BLD-SCNC: 4.5 MMOL/L (ref 3.5–5.2)
RBC # BLD AUTO: 3.94 10*6/MM3 (ref 3.77–5.28)
SODIUM BLD-SCNC: 139 MMOL/L (ref 136–145)
VANCOMYCIN TROUGH SERPL-MCNC: 11.8 MCG/ML (ref 5–20)
WBC NRBC COR # BLD: 5.62 10*3/MM3 (ref 3.4–10.8)

## 2019-07-04 PROCEDURE — 80202 ASSAY OF VANCOMYCIN: CPT

## 2019-07-04 PROCEDURE — 80048 BASIC METABOLIC PNL TOTAL CA: CPT

## 2019-07-04 PROCEDURE — 86140 C-REACTIVE PROTEIN: CPT | Performed by: PHYSICIAN ASSISTANT

## 2019-07-04 PROCEDURE — 85025 COMPLETE CBC W/AUTO DIFF WBC: CPT | Performed by: PHYSICIAN ASSISTANT

## 2019-07-04 PROCEDURE — 25010000002 VANCOMYCIN 5 G RECONSTITUTED SOLUTION 5,000 MG VIAL: Performed by: INTERNAL MEDICINE

## 2019-07-04 PROCEDURE — 99232 SBSQ HOSP IP/OBS MODERATE 35: CPT | Performed by: HOSPITALIST

## 2019-07-04 PROCEDURE — 25010000003 MICAFUNGIN SODIUM 100 MG RECONSTITUTED SOLUTION 1 EACH VIAL: Performed by: PHYSICIAN ASSISTANT

## 2019-07-04 PROCEDURE — 25010000002 VANCOMYCIN 5 G RECONSTITUTED SOLUTION 5,000 MG VIAL: Performed by: HOSPITALIST

## 2019-07-04 RX ADMIN — MIRTAZAPINE 15 MG: 15 TABLET, FILM COATED ORAL at 21:44

## 2019-07-04 RX ADMIN — Medication 1 TABLET: at 09:21

## 2019-07-04 RX ADMIN — CARBAMAZEPINE 200 MG: 200 TABLET ORAL at 09:21

## 2019-07-04 RX ADMIN — HYDROCODONE BITARTRATE AND ACETAMINOPHEN 1 TABLET: 5; 325 TABLET ORAL at 14:58

## 2019-07-04 RX ADMIN — SODIUM HYPOCHLORITE: 1.25 SOLUTION TOPICAL at 21:45

## 2019-07-04 RX ADMIN — HYDROCODONE BITARTRATE AND ACETAMINOPHEN 1 TABLET: 5; 325 TABLET ORAL at 21:44

## 2019-07-04 RX ADMIN — VANCOMYCIN HYDROCHLORIDE 750 MG: 5 INJECTION, POWDER, LYOPHILIZED, FOR SOLUTION INTRAVENOUS at 11:57

## 2019-07-04 RX ADMIN — MICAFUNGIN SODIUM 100 MG: 20 INJECTION, POWDER, LYOPHILIZED, FOR SOLUTION INTRAVENOUS at 17:53

## 2019-07-04 RX ADMIN — ASPIRIN 81 MG: 81 TABLET, CHEWABLE ORAL at 09:21

## 2019-07-04 RX ADMIN — Medication 1 TABLET: at 09:22

## 2019-07-04 RX ADMIN — SENNOSIDES AND DOCUSATE SODIUM 2 TABLET: 8.6; 5 TABLET ORAL at 21:44

## 2019-07-04 RX ADMIN — VANCOMYCIN HYDROCHLORIDE 750 MG: 5 INJECTION, POWDER, LYOPHILIZED, FOR SOLUTION INTRAVENOUS at 23:37

## 2019-07-04 RX ADMIN — FERROUS SULFATE TAB 325 MG (65 MG ELEMENTAL FE) 325 MG: 325 (65 FE) TAB at 09:21

## 2019-07-04 RX ADMIN — APIXABAN 2.5 MG: 2.5 TABLET, FILM COATED ORAL at 21:44

## 2019-07-04 RX ADMIN — APIXABAN 2.5 MG: 2.5 TABLET, FILM COATED ORAL at 09:22

## 2019-07-04 RX ADMIN — CARBAMAZEPINE 200 MG: 200 TABLET ORAL at 21:44

## 2019-07-04 RX ADMIN — HYDROCODONE BITARTRATE AND ACETAMINOPHEN 1 TABLET: 5; 325 TABLET ORAL at 05:28

## 2019-07-04 RX ADMIN — FERROUS SULFATE TAB 325 MG (65 MG ELEMENTAL FE) 325 MG: 325 (65 FE) TAB at 17:53

## 2019-07-04 RX ADMIN — LEVOTHYROXINE SODIUM 25 MCG: 25 TABLET ORAL at 05:28

## 2019-07-04 RX ADMIN — Medication 1 TABLET: at 17:53

## 2019-07-04 RX ADMIN — SODIUM HYPOCHLORITE: 1.25 SOLUTION TOPICAL at 09:42

## 2019-07-04 RX ADMIN — POLYETHYLENE GLYCOL (3350) 17 G: 17 POWDER, FOR SOLUTION ORAL at 09:22

## 2019-07-05 ENCOUNTER — APPOINTMENT (OUTPATIENT)
Dept: GENERAL RADIOLOGY | Facility: HOSPITAL | Age: 84
End: 2019-07-05

## 2019-07-05 PROCEDURE — 71045 X-RAY EXAM CHEST 1 VIEW: CPT

## 2019-07-05 PROCEDURE — 99232 SBSQ HOSP IP/OBS MODERATE 35: CPT | Performed by: HOSPITALIST

## 2019-07-05 PROCEDURE — C1751 CATH, INF, PER/CENT/MIDLINE: HCPCS

## 2019-07-05 PROCEDURE — 25010000002 VANCOMYCIN 5 G RECONSTITUTED SOLUTION 5,000 MG VIAL: Performed by: HOSPITALIST

## 2019-07-05 PROCEDURE — 02HV33Z INSERTION OF INFUSION DEVICE INTO SUPERIOR VENA CAVA, PERCUTANEOUS APPROACH: ICD-10-PCS | Performed by: INTERNAL MEDICINE

## 2019-07-05 PROCEDURE — 71045 X-RAY EXAM CHEST 1 VIEW: CPT | Performed by: RADIOLOGY

## 2019-07-05 PROCEDURE — B548ZZA ULTRASONOGRAPHY OF SUPERIOR VENA CAVA, GUIDANCE: ICD-10-PCS | Performed by: INTERNAL MEDICINE

## 2019-07-05 RX ORDER — SODIUM CHLORIDE 0.9 % (FLUSH) 0.9 %
10 SYRINGE (ML) INJECTION EVERY 12 HOURS SCHEDULED
Status: DISCONTINUED | OUTPATIENT
Start: 2019-07-05 | End: 2019-07-10 | Stop reason: HOSPADM

## 2019-07-05 RX ORDER — SODIUM CHLORIDE 0.9 % (FLUSH) 0.9 %
10 SYRINGE (ML) INJECTION AS NEEDED
Status: DISCONTINUED | OUTPATIENT
Start: 2019-07-05 | End: 2019-07-10 | Stop reason: HOSPADM

## 2019-07-05 RX ORDER — SODIUM CHLORIDE 0.9 % (FLUSH) 0.9 %
20 SYRINGE (ML) INJECTION AS NEEDED
Status: DISCONTINUED | OUTPATIENT
Start: 2019-07-05 | End: 2019-07-10 | Stop reason: HOSPADM

## 2019-07-05 RX ADMIN — SODIUM CHLORIDE, PRESERVATIVE FREE 10 ML: 5 INJECTION INTRAVENOUS at 21:10

## 2019-07-05 RX ADMIN — MIRTAZAPINE 15 MG: 15 TABLET, FILM COATED ORAL at 21:11

## 2019-07-05 RX ADMIN — HYDROCODONE BITARTRATE AND ACETAMINOPHEN 1 TABLET: 5; 325 TABLET ORAL at 05:08

## 2019-07-05 RX ADMIN — APIXABAN 2.5 MG: 2.5 TABLET, FILM COATED ORAL at 21:12

## 2019-07-05 RX ADMIN — ASPIRIN 81 MG: 81 TABLET, CHEWABLE ORAL at 09:30

## 2019-07-05 RX ADMIN — FERROUS SULFATE TAB 325 MG (65 MG ELEMENTAL FE) 325 MG: 325 (65 FE) TAB at 11:26

## 2019-07-05 RX ADMIN — Medication 1 TABLET: at 09:30

## 2019-07-05 RX ADMIN — SODIUM HYPOCHLORITE: 1.25 SOLUTION TOPICAL at 09:30

## 2019-07-05 RX ADMIN — VANCOMYCIN HYDROCHLORIDE 750 MG: 5 INJECTION, POWDER, LYOPHILIZED, FOR SOLUTION INTRAVENOUS at 11:51

## 2019-07-05 RX ADMIN — APIXABAN 2.5 MG: 2.5 TABLET, FILM COATED ORAL at 09:00

## 2019-07-05 RX ADMIN — CARBAMAZEPINE 200 MG: 200 TABLET ORAL at 09:30

## 2019-07-05 RX ADMIN — HYDROCODONE BITARTRATE AND ACETAMINOPHEN 1 TABLET: 5; 325 TABLET ORAL at 21:11

## 2019-07-05 RX ADMIN — SODIUM CHLORIDE, PRESERVATIVE FREE 3 ML: 5 INJECTION INTRAVENOUS at 21:10

## 2019-07-05 RX ADMIN — LEVOTHYROXINE SODIUM 25 MCG: 25 TABLET ORAL at 05:10

## 2019-07-05 RX ADMIN — CARBAMAZEPINE 200 MG: 200 TABLET ORAL at 21:11

## 2019-07-05 RX ADMIN — POLYETHYLENE GLYCOL (3350) 17 G: 17 POWDER, FOR SOLUTION ORAL at 09:00

## 2019-07-05 RX ADMIN — Medication 1 TABLET: at 18:20

## 2019-07-05 RX ADMIN — FERROUS SULFATE TAB 325 MG (65 MG ELEMENTAL FE) 325 MG: 325 (65 FE) TAB at 18:21

## 2019-07-05 RX ADMIN — HYDROCODONE BITARTRATE AND ACETAMINOPHEN 1 TABLET: 5; 325 TABLET ORAL at 15:19

## 2019-07-05 RX ADMIN — SODIUM HYPOCHLORITE: 1.25 SOLUTION TOPICAL at 21:12

## 2019-07-06 LAB — CRP SERPL-MCNC: 0.45 MG/DL (ref 0–0.5)

## 2019-07-06 PROCEDURE — 94799 UNLISTED PULMONARY SVC/PX: CPT

## 2019-07-06 PROCEDURE — 25010000002 CYANOCOBALAMIN PER 1000 MCG: Performed by: SURGERY

## 2019-07-06 PROCEDURE — 86140 C-REACTIVE PROTEIN: CPT | Performed by: NURSE PRACTITIONER

## 2019-07-06 PROCEDURE — 25010000002 VANCOMYCIN 5 G RECONSTITUTED SOLUTION 5,000 MG VIAL: Performed by: HOSPITALIST

## 2019-07-06 PROCEDURE — 99232 SBSQ HOSP IP/OBS MODERATE 35: CPT | Performed by: HOSPITALIST

## 2019-07-06 RX ADMIN — FERROUS SULFATE TAB 325 MG (65 MG ELEMENTAL FE) 325 MG: 325 (65 FE) TAB at 16:26

## 2019-07-06 RX ADMIN — APIXABAN 2.5 MG: 2.5 TABLET, FILM COATED ORAL at 20:55

## 2019-07-06 RX ADMIN — SODIUM HYPOCHLORITE: 1.25 SOLUTION TOPICAL at 20:55

## 2019-07-06 RX ADMIN — SODIUM HYPOCHLORITE: 1.25 SOLUTION TOPICAL at 09:45

## 2019-07-06 RX ADMIN — HYDROCODONE BITARTRATE AND ACETAMINOPHEN 1 TABLET: 5; 325 TABLET ORAL at 05:10

## 2019-07-06 RX ADMIN — APIXABAN 2.5 MG: 2.5 TABLET, FILM COATED ORAL at 09:38

## 2019-07-06 RX ADMIN — HYDROCODONE BITARTRATE AND ACETAMINOPHEN 1 TABLET: 5; 325 TABLET ORAL at 16:26

## 2019-07-06 RX ADMIN — ASPIRIN 81 MG: 81 TABLET, CHEWABLE ORAL at 09:38

## 2019-07-06 RX ADMIN — CARBAMAZEPINE 200 MG: 200 TABLET ORAL at 20:55

## 2019-07-06 RX ADMIN — OFLOXACIN 50000 UNITS: 300 TABLET, COATED ORAL at 09:38

## 2019-07-06 RX ADMIN — CYANOCOBALAMIN 1000 MCG: 1000 INJECTION, SOLUTION INTRAMUSCULAR at 09:37

## 2019-07-06 RX ADMIN — HYDROCODONE BITARTRATE AND ACETAMINOPHEN 1 TABLET: 5; 325 TABLET ORAL at 20:55

## 2019-07-06 RX ADMIN — VANCOMYCIN HYDROCHLORIDE 750 MG: 5 INJECTION, POWDER, LYOPHILIZED, FOR SOLUTION INTRAVENOUS at 12:24

## 2019-07-06 RX ADMIN — SODIUM CHLORIDE, PRESERVATIVE FREE 10 ML: 5 INJECTION INTRAVENOUS at 20:56

## 2019-07-06 RX ADMIN — CARBAMAZEPINE 200 MG: 200 TABLET ORAL at 09:37

## 2019-07-06 RX ADMIN — VANCOMYCIN HYDROCHLORIDE 750 MG: 5 INJECTION, POWDER, LYOPHILIZED, FOR SOLUTION INTRAVENOUS at 01:26

## 2019-07-06 RX ADMIN — FERROUS SULFATE TAB 325 MG (65 MG ELEMENTAL FE) 325 MG: 325 (65 FE) TAB at 09:38

## 2019-07-06 RX ADMIN — Medication 1 TABLET: at 16:26

## 2019-07-06 RX ADMIN — Medication 1 TABLET: at 09:38

## 2019-07-06 RX ADMIN — MIRTAZAPINE 15 MG: 15 TABLET, FILM COATED ORAL at 20:55

## 2019-07-06 RX ADMIN — SODIUM CHLORIDE, PRESERVATIVE FREE 10 ML: 5 INJECTION INTRAVENOUS at 20:57

## 2019-07-06 RX ADMIN — SENNOSIDES AND DOCUSATE SODIUM 2 TABLET: 8.6; 5 TABLET ORAL at 20:55

## 2019-07-06 RX ADMIN — LEVOTHYROXINE SODIUM 25 MCG: 25 TABLET ORAL at 05:10

## 2019-07-07 LAB
CRP SERPL-MCNC: 0.33 MG/DL (ref 0–0.5)
VANCOMYCIN TROUGH SERPL-MCNC: 19.4 MCG/ML (ref 5–20)

## 2019-07-07 PROCEDURE — 25010000002 VANCOMYCIN 5 G RECONSTITUTED SOLUTION 5,000 MG VIAL: Performed by: HOSPITALIST

## 2019-07-07 PROCEDURE — 80202 ASSAY OF VANCOMYCIN: CPT | Performed by: HOSPITALIST

## 2019-07-07 PROCEDURE — 94799 UNLISTED PULMONARY SVC/PX: CPT

## 2019-07-07 PROCEDURE — 99232 SBSQ HOSP IP/OBS MODERATE 35: CPT | Performed by: HOSPITALIST

## 2019-07-07 PROCEDURE — 86140 C-REACTIVE PROTEIN: CPT | Performed by: NURSE PRACTITIONER

## 2019-07-07 RX ADMIN — VANCOMYCIN HYDROCHLORIDE 750 MG: 5 INJECTION, POWDER, LYOPHILIZED, FOR SOLUTION INTRAVENOUS at 12:27

## 2019-07-07 RX ADMIN — SODIUM CHLORIDE, PRESERVATIVE FREE 10 ML: 5 INJECTION INTRAVENOUS at 20:35

## 2019-07-07 RX ADMIN — FERROUS SULFATE TAB 325 MG (65 MG ELEMENTAL FE) 325 MG: 325 (65 FE) TAB at 07:33

## 2019-07-07 RX ADMIN — APIXABAN 2.5 MG: 2.5 TABLET, FILM COATED ORAL at 07:33

## 2019-07-07 RX ADMIN — POLYETHYLENE GLYCOL (3350) 17 G: 17 POWDER, FOR SOLUTION ORAL at 07:33

## 2019-07-07 RX ADMIN — FERROUS SULFATE TAB 325 MG (65 MG ELEMENTAL FE) 325 MG: 325 (65 FE) TAB at 16:35

## 2019-07-07 RX ADMIN — VANCOMYCIN HYDROCHLORIDE 750 MG: 5 INJECTION, POWDER, LYOPHILIZED, FOR SOLUTION INTRAVENOUS at 23:46

## 2019-07-07 RX ADMIN — SENNOSIDES AND DOCUSATE SODIUM 2 TABLET: 8.6; 5 TABLET ORAL at 20:34

## 2019-07-07 RX ADMIN — SODIUM HYPOCHLORITE: 1.25 SOLUTION TOPICAL at 07:33

## 2019-07-07 RX ADMIN — ASPIRIN 81 MG: 81 TABLET, CHEWABLE ORAL at 07:33

## 2019-07-07 RX ADMIN — Medication 1 TABLET: at 07:33

## 2019-07-07 RX ADMIN — MIRTAZAPINE 15 MG: 15 TABLET, FILM COATED ORAL at 20:34

## 2019-07-07 RX ADMIN — LEVOTHYROXINE SODIUM 25 MCG: 25 TABLET ORAL at 05:13

## 2019-07-07 RX ADMIN — VANCOMYCIN HYDROCHLORIDE 750 MG: 5 INJECTION, POWDER, LYOPHILIZED, FOR SOLUTION INTRAVENOUS at 01:15

## 2019-07-07 RX ADMIN — CARBAMAZEPINE 200 MG: 200 TABLET ORAL at 20:34

## 2019-07-07 RX ADMIN — CARBAMAZEPINE 200 MG: 200 TABLET ORAL at 07:33

## 2019-07-07 RX ADMIN — SODIUM HYPOCHLORITE: 1.25 SOLUTION TOPICAL at 20:35

## 2019-07-07 RX ADMIN — HYDROCODONE BITARTRATE AND ACETAMINOPHEN 1 TABLET: 5; 325 TABLET ORAL at 20:34

## 2019-07-07 RX ADMIN — APIXABAN 2.5 MG: 2.5 TABLET, FILM COATED ORAL at 20:34

## 2019-07-07 RX ADMIN — HYDROCODONE BITARTRATE AND ACETAMINOPHEN 1 TABLET: 5; 325 TABLET ORAL at 13:19

## 2019-07-07 RX ADMIN — HYDROCODONE BITARTRATE AND ACETAMINOPHEN 1 TABLET: 5; 325 TABLET ORAL at 05:14

## 2019-07-07 RX ADMIN — SODIUM CHLORIDE, PRESERVATIVE FREE 10 ML: 5 INJECTION INTRAVENOUS at 07:34

## 2019-07-07 RX ADMIN — Medication 1 TABLET: at 16:35

## 2019-07-08 LAB — CRP SERPL-MCNC: 0.24 MG/DL (ref 0–0.5)

## 2019-07-08 PROCEDURE — 94799 UNLISTED PULMONARY SVC/PX: CPT

## 2019-07-08 PROCEDURE — 86140 C-REACTIVE PROTEIN: CPT | Performed by: NURSE PRACTITIONER

## 2019-07-08 PROCEDURE — 99232 SBSQ HOSP IP/OBS MODERATE 35: CPT | Performed by: INTERNAL MEDICINE

## 2019-07-08 PROCEDURE — 25010000002 VANCOMYCIN 5 G RECONSTITUTED SOLUTION 5,000 MG VIAL: Performed by: HOSPITALIST

## 2019-07-08 RX ADMIN — HYDROCODONE BITARTRATE AND ACETAMINOPHEN 1 TABLET: 5; 325 TABLET ORAL at 05:11

## 2019-07-08 RX ADMIN — LEVOTHYROXINE SODIUM 25 MCG: 25 TABLET ORAL at 05:11

## 2019-07-08 RX ADMIN — SODIUM HYPOCHLORITE: 1.25 SOLUTION TOPICAL at 09:30

## 2019-07-08 RX ADMIN — FERROUS SULFATE TAB 325 MG (65 MG ELEMENTAL FE) 325 MG: 325 (65 FE) TAB at 09:29

## 2019-07-08 RX ADMIN — Medication 1 TABLET: at 09:29

## 2019-07-08 RX ADMIN — VANCOMYCIN HYDROCHLORIDE 750 MG: 5 INJECTION, POWDER, LYOPHILIZED, FOR SOLUTION INTRAVENOUS at 23:19

## 2019-07-08 RX ADMIN — CARBAMAZEPINE 200 MG: 200 TABLET ORAL at 20:56

## 2019-07-08 RX ADMIN — APIXABAN 2.5 MG: 2.5 TABLET, FILM COATED ORAL at 09:30

## 2019-07-08 RX ADMIN — ASPIRIN 81 MG: 81 TABLET, CHEWABLE ORAL at 09:29

## 2019-07-08 RX ADMIN — HYDROCODONE BITARTRATE AND ACETAMINOPHEN 1 TABLET: 5; 325 TABLET ORAL at 21:04

## 2019-07-08 RX ADMIN — SODIUM CHLORIDE, PRESERVATIVE FREE 10 ML: 5 INJECTION INTRAVENOUS at 21:17

## 2019-07-08 RX ADMIN — CARBAMAZEPINE 200 MG: 200 TABLET ORAL at 09:30

## 2019-07-08 RX ADMIN — FERROUS SULFATE TAB 325 MG (65 MG ELEMENTAL FE) 325 MG: 325 (65 FE) TAB at 18:23

## 2019-07-08 RX ADMIN — MIRTAZAPINE 15 MG: 15 TABLET, FILM COATED ORAL at 20:56

## 2019-07-08 RX ADMIN — VANCOMYCIN HYDROCHLORIDE 750 MG: 5 INJECTION, POWDER, LYOPHILIZED, FOR SOLUTION INTRAVENOUS at 12:58

## 2019-07-08 RX ADMIN — HYDROCODONE BITARTRATE AND ACETAMINOPHEN 1 TABLET: 5; 325 TABLET ORAL at 12:58

## 2019-07-08 RX ADMIN — SENNOSIDES AND DOCUSATE SODIUM 2 TABLET: 8.6; 5 TABLET ORAL at 20:56

## 2019-07-08 RX ADMIN — SODIUM HYPOCHLORITE: 1.25 SOLUTION TOPICAL at 20:56

## 2019-07-08 RX ADMIN — POLYETHYLENE GLYCOL (3350) 17 G: 17 POWDER, FOR SOLUTION ORAL at 09:30

## 2019-07-08 RX ADMIN — APIXABAN 2.5 MG: 2.5 TABLET, FILM COATED ORAL at 20:56

## 2019-07-08 RX ADMIN — Medication 1 TABLET: at 18:23

## 2019-07-09 LAB — CRP SERPL-MCNC: 0.21 MG/DL (ref 0–0.5)

## 2019-07-09 PROCEDURE — 25010000002 VANCOMYCIN 5 G RECONSTITUTED SOLUTION 5,000 MG VIAL: Performed by: HOSPITALIST

## 2019-07-09 PROCEDURE — 86140 C-REACTIVE PROTEIN: CPT | Performed by: NURSE PRACTITIONER

## 2019-07-09 PROCEDURE — 99232 SBSQ HOSP IP/OBS MODERATE 35: CPT | Performed by: INTERNAL MEDICINE

## 2019-07-09 RX ADMIN — SODIUM HYPOCHLORITE: 1.25 SOLUTION TOPICAL at 21:42

## 2019-07-09 RX ADMIN — SODIUM CHLORIDE, PRESERVATIVE FREE 10 ML: 5 INJECTION INTRAVENOUS at 21:43

## 2019-07-09 RX ADMIN — MIRTAZAPINE 15 MG: 15 TABLET, FILM COATED ORAL at 21:43

## 2019-07-09 RX ADMIN — SODIUM CHLORIDE, PRESERVATIVE FREE 10 ML: 5 INJECTION INTRAVENOUS at 08:08

## 2019-07-09 RX ADMIN — Medication 1 TABLET: at 08:08

## 2019-07-09 RX ADMIN — APIXABAN 2.5 MG: 2.5 TABLET, FILM COATED ORAL at 08:08

## 2019-07-09 RX ADMIN — ASPIRIN 81 MG: 81 TABLET, CHEWABLE ORAL at 08:08

## 2019-07-09 RX ADMIN — HYDROCODONE BITARTRATE AND ACETAMINOPHEN 1 TABLET: 5; 325 TABLET ORAL at 05:21

## 2019-07-09 RX ADMIN — SENNOSIDES AND DOCUSATE SODIUM 2 TABLET: 8.6; 5 TABLET ORAL at 21:43

## 2019-07-09 RX ADMIN — FERROUS SULFATE TAB 325 MG (65 MG ELEMENTAL FE) 325 MG: 325 (65 FE) TAB at 16:32

## 2019-07-09 RX ADMIN — CARBAMAZEPINE 200 MG: 200 TABLET ORAL at 21:43

## 2019-07-09 RX ADMIN — LEVOTHYROXINE SODIUM 25 MCG: 25 TABLET ORAL at 05:21

## 2019-07-09 RX ADMIN — HYDROCODONE BITARTRATE AND ACETAMINOPHEN 1 TABLET: 5; 325 TABLET ORAL at 13:42

## 2019-07-09 RX ADMIN — POLYETHYLENE GLYCOL (3350) 17 G: 17 POWDER, FOR SOLUTION ORAL at 08:08

## 2019-07-09 RX ADMIN — VANCOMYCIN HYDROCHLORIDE 750 MG: 5 INJECTION, POWDER, LYOPHILIZED, FOR SOLUTION INTRAVENOUS at 21:43

## 2019-07-09 RX ADMIN — HYDROCODONE BITARTRATE AND ACETAMINOPHEN 1 TABLET: 5; 325 TABLET ORAL at 21:43

## 2019-07-09 RX ADMIN — SODIUM HYPOCHLORITE: 1.25 SOLUTION TOPICAL at 08:09

## 2019-07-09 RX ADMIN — VANCOMYCIN HYDROCHLORIDE 750 MG: 5 INJECTION, POWDER, LYOPHILIZED, FOR SOLUTION INTRAVENOUS at 11:32

## 2019-07-09 RX ADMIN — SODIUM CHLORIDE, PRESERVATIVE FREE 10 ML: 5 INJECTION INTRAVENOUS at 21:44

## 2019-07-09 RX ADMIN — Medication 1 TABLET: at 16:32

## 2019-07-09 RX ADMIN — CARBAMAZEPINE 200 MG: 200 TABLET ORAL at 08:08

## 2019-07-09 RX ADMIN — APIXABAN 2.5 MG: 2.5 TABLET, FILM COATED ORAL at 21:43

## 2019-07-09 RX ADMIN — SODIUM CHLORIDE, PRESERVATIVE FREE 10 ML: 5 INJECTION INTRAVENOUS at 21:45

## 2019-07-09 RX ADMIN — FERROUS SULFATE TAB 325 MG (65 MG ELEMENTAL FE) 325 MG: 325 (65 FE) TAB at 08:08

## 2019-07-10 VITALS
HEIGHT: 66 IN | HEART RATE: 69 BPM | SYSTOLIC BLOOD PRESSURE: 119 MMHG | TEMPERATURE: 96.9 F | RESPIRATION RATE: 18 BRPM | BODY MASS INDEX: 14.27 KG/M2 | DIASTOLIC BLOOD PRESSURE: 91 MMHG | OXYGEN SATURATION: 95 % | WEIGHT: 88.8 LBS

## 2019-07-10 LAB — CRP SERPL-MCNC: 0.19 MG/DL (ref 0–0.5)

## 2019-07-10 PROCEDURE — 86140 C-REACTIVE PROTEIN: CPT | Performed by: NURSE PRACTITIONER

## 2019-07-10 PROCEDURE — 25010000002 VANCOMYCIN 5 G RECONSTITUTED SOLUTION 5,000 MG VIAL: Performed by: HOSPITALIST

## 2019-07-10 PROCEDURE — 99239 HOSP IP/OBS DSCHRG MGMT >30: CPT | Performed by: INTERNAL MEDICINE

## 2019-07-10 RX ORDER — SERTRALINE HYDROCHLORIDE 25 MG/1
25 TABLET, FILM COATED ORAL DAILY
Start: 2019-07-10

## 2019-07-10 RX ORDER — HYDROCODONE BITARTRATE AND ACETAMINOPHEN 5; 325 MG/1; MG/1
1 TABLET ORAL EVERY 4 HOURS PRN
Qty: 5 TABLET | Refills: 0 | Status: SHIPPED | OUTPATIENT
Start: 2019-07-10

## 2019-07-10 RX ADMIN — CARBAMAZEPINE 200 MG: 200 TABLET ORAL at 13:06

## 2019-07-10 RX ADMIN — LEVOTHYROXINE SODIUM 25 MCG: 25 TABLET ORAL at 05:22

## 2019-07-10 RX ADMIN — SODIUM HYPOCHLORITE: 1.25 SOLUTION TOPICAL at 09:20

## 2019-07-10 RX ADMIN — POLYETHYLENE GLYCOL (3350) 17 G: 17 POWDER, FOR SOLUTION ORAL at 09:13

## 2019-07-10 RX ADMIN — SODIUM CHLORIDE, PRESERVATIVE FREE 3 ML: 5 INJECTION INTRAVENOUS at 09:18

## 2019-07-10 RX ADMIN — FERROUS SULFATE TAB 325 MG (65 MG ELEMENTAL FE) 325 MG: 325 (65 FE) TAB at 16:40

## 2019-07-10 RX ADMIN — APIXABAN 2.5 MG: 2.5 TABLET, FILM COATED ORAL at 09:13

## 2019-07-10 RX ADMIN — SODIUM CHLORIDE, PRESERVATIVE FREE 10 ML: 5 INJECTION INTRAVENOUS at 09:19

## 2019-07-10 RX ADMIN — Medication 1 TABLET: at 16:40

## 2019-07-10 RX ADMIN — FERROUS SULFATE TAB 325 MG (65 MG ELEMENTAL FE) 325 MG: 325 (65 FE) TAB at 09:13

## 2019-07-10 RX ADMIN — HYDROCODONE BITARTRATE AND ACETAMINOPHEN 1 TABLET: 5; 325 TABLET ORAL at 15:31

## 2019-07-10 RX ADMIN — Medication 1 TABLET: at 09:13

## 2019-07-10 RX ADMIN — HYDROCODONE BITARTRATE AND ACETAMINOPHEN 1 TABLET: 5; 325 TABLET ORAL at 05:21

## 2019-07-10 RX ADMIN — ASPIRIN 81 MG: 81 TABLET, CHEWABLE ORAL at 09:13

## 2019-07-10 RX ADMIN — VANCOMYCIN HYDROCHLORIDE 750 MG: 5 INJECTION, POWDER, LYOPHILIZED, FOR SOLUTION INTRAVENOUS at 13:06

## 2019-07-10 RX ADMIN — SODIUM CHLORIDE, PRESERVATIVE FREE 10 ML: 5 INJECTION INTRAVENOUS at 09:20

## 2019-07-11 ENCOUNTER — PATIENT OUTREACH (OUTPATIENT)
Dept: CASE MANAGEMENT | Facility: OTHER | Age: 84
End: 2019-07-11

## 2019-07-11 ENCOUNTER — EPISODE CHANGES (OUTPATIENT)
Dept: CASE MANAGEMENT | Facility: OTHER | Age: 84
End: 2019-07-11

## 2019-07-11 NOTE — OUTREACH NOTE
SNF Follow-up Note    Skilled Nursing Facility Discharge Assessment 7/11/2019   Acute Facility Discharged From [facility]   Acute Discharge Date 7/10/2019   Name of the Skilled Nursing Facility? [facility]   Tier Level of the Skilled Nursing Facility -   Purpose of SNF Admission PT;OT;LTC   Estimated length of stay for the patient? LTC   Who is the insurance provider or payor of patient stay? Medicare;Medicaid   Progression of Patient? New admission to [facility] in [place]   Skilled Nursing Discharge Date? -   Where was the patient discharged to? -       Brianna Cuellar RN    7/11/2019, 2:53 PM

## 2019-07-24 DIAGNOSIS — S52.532D CLOSED COLLES' FRACTURE OF LEFT RADIUS WITH ROUTINE HEALING, SUBSEQUENT ENCOUNTER: Primary | ICD-10-CM

## 2019-07-24 DIAGNOSIS — Z87.81 S/P ORIF (OPEN REDUCTION INTERNAL FIXATION) FRACTURE: ICD-10-CM

## 2019-07-24 DIAGNOSIS — S72.141D CLOSED DISPLACED INTERTROCHANTERIC FRACTURE OF RIGHT FEMUR WITH ROUTINE HEALING, SUBSEQUENT ENCOUNTER: ICD-10-CM

## 2019-07-24 DIAGNOSIS — Z98.890 S/P ORIF (OPEN REDUCTION INTERNAL FIXATION) FRACTURE: ICD-10-CM

## 2019-07-25 ENCOUNTER — OFFICE VISIT (OUTPATIENT)
Dept: ORTHOPEDIC SURGERY | Facility: CLINIC | Age: 84
End: 2019-07-25

## 2019-07-25 ENCOUNTER — HOSPITAL ENCOUNTER (OUTPATIENT)
Dept: GENERAL RADIOLOGY | Facility: HOSPITAL | Age: 84
Discharge: HOME | End: 2019-07-25
Admitting: ORTHOPAEDIC SURGERY

## 2019-07-25 VITALS — HEIGHT: 66 IN | WEIGHT: 84 LBS | BODY MASS INDEX: 13.5 KG/M2

## 2019-07-25 DIAGNOSIS — S52.532D CLOSED COLLES' FRACTURE OF LEFT RADIUS WITH ROUTINE HEALING, SUBSEQUENT ENCOUNTER: ICD-10-CM

## 2019-07-25 DIAGNOSIS — Z98.890 S/P ORIF (OPEN REDUCTION INTERNAL FIXATION) FRACTURE: ICD-10-CM

## 2019-07-25 DIAGNOSIS — Z87.81 S/P ORIF (OPEN REDUCTION INTERNAL FIXATION) FRACTURE: ICD-10-CM

## 2019-07-25 DIAGNOSIS — Z87.81 S/P ORIF (OPEN REDUCTION INTERNAL FIXATION) FRACTURE: Primary | ICD-10-CM

## 2019-07-25 DIAGNOSIS — Z98.890 S/P ORIF (OPEN REDUCTION INTERNAL FIXATION) FRACTURE: Primary | ICD-10-CM

## 2019-07-25 DIAGNOSIS — S72.141D CLOSED DISPLACED INTERTROCHANTERIC FRACTURE OF RIGHT FEMUR WITH ROUTINE HEALING, SUBSEQUENT ENCOUNTER: ICD-10-CM

## 2019-07-25 PROCEDURE — 99213 OFFICE O/P EST LOW 20 MIN: CPT | Performed by: ORTHOPAEDIC SURGERY

## 2019-07-25 PROCEDURE — 73502 X-RAY EXAM HIP UNI 2-3 VIEWS: CPT

## 2019-07-25 PROCEDURE — 73110 X-RAY EXAM OF WRIST: CPT

## 2019-07-25 PROCEDURE — 99024 POSTOP FOLLOW-UP VISIT: CPT | Performed by: ORTHOPAEDIC SURGERY

## 2019-07-25 PROCEDURE — 73502 X-RAY EXAM HIP UNI 2-3 VIEWS: CPT | Performed by: RADIOLOGY

## 2019-07-25 PROCEDURE — 73110 X-RAY EXAM OF WRIST: CPT | Performed by: RADIOLOGY

## 2019-07-25 NOTE — PROGRESS NOTES
"Patient: Mary Lou Graves    YOB: 1935    Chief Complaint   Patient presents with   • Right Hip - Follow-up, Pain   • Left Wrist - Follow-up, Pain         History of Present Illness: Patient presents back approximately 4 months status post open reduction internal fixation of her right hip intertrochanteric fracture with a gamma nail and a left wrist fracture.  She is known history of baseline dementia.  Apparently she had a feeding tube put in for a while because she was not eating.  She is in a new nursing home now.  Son who is with her most the time says there is no really specific new complaints    Past Medical History:   Diagnosis Date   • Alzheimer's dementia    • Anemia    • Arthritis    • Atrial fibrillation and flutter (CMS/HCC)    • Coronary atherosclerosis of native coronary artery    • Dementia    • Depression    • Disease of thyroid gland    • Hyperlipidemia    • Hypertension    • MRSA (methicillin resistant staph aureus) culture positive         Social History     Socioeconomic History   • Marital status:      Spouse name: Not on file   • Number of children: Not on file   • Years of education: Not on file   • Highest education level: Not on file   Tobacco Use   • Smoking status: Former Smoker     Packs/day: 0.50   • Smokeless tobacco: Never Used   Substance and Sexual Activity   • Alcohol use: No     Frequency: Never     Comment: denies   • Drug use: No     Comment: denies   • Sexual activity: No           Physical Exam: 83 y.o. female  General Appearance:    Alert and oriented x 3, cooperative, in no acute distress                   Vitals:    07/25/19 1412   Weight: 38.1 kg (84 lb)   Height: 167.6 cm (66\")          Left wrist shows no obvious swelling or significant deformity.  Appears to be moving her fingers well and range of motion of her wrist is reasonable.  Does not appear to have any pain.    Right hip has good passive range of motion.  Does not appear to cause her any " pain or distress.  Patient is quite cachectic.      Radiology:       Trace taken today show good healing of the left distal radius with good callus is formed and no change in position.  There is also good healing of the right hip intertrochanteric fracture with good callus and no change in alignment of the metal fixation.  This was reviewed by myself.      Assessment/Plan: Healing status post ORIF right hip and left distal radius fracture.  From my standpoint she can try to ambulate weightbearing as tolerates.  Concerned because of her mental status and general health if she will ever accomplish much of this.  She is a new nursing home and I guess her and try some new therapy I did write a prescription for this for her.  I have not scheduled her to come back at this point unless there is a new problem or concern.  We will see her back as needed.  Her son who is with her today is aware of this          Discussion/Summary:                This chart was completed utilizing the dragon speech recognition software.  Grammatical errors, random word insertions, pronoun errors, and incomplete sentences or occasional consequences of the system due to software limitations, ambient noise, and hardware issues.  Any questions or concerns about the content, text, or information contained within the body of this dictation should be directly addressed to the physician for clarification        This document was signed by Sebas Burris M.D. July 25, 2019 2:37 PM

## 2019-09-19 ENCOUNTER — PATIENT OUTREACH (OUTPATIENT)
Dept: CASE MANAGEMENT | Facility: OTHER | Age: 84
End: 2019-09-19

## 2019-09-19 NOTE — OUTREACH NOTE
SNF Follow-up Note      Responses   Acute Facility Discharged From  [place]   Acute Discharge Date  07/10/19   Name of the Skilled Nursing Facility?  [facility]   Tier Level of the Skilled Nursing Facility  2   Purpose of SNF Admission  PT, OT, LTC   Estimated length of stay for the patient?  LTC   Who is the insurance provider or payor of patient stay?  Private Pay   Progression of Patient?  Spoke with Grey,  patient is now under private pay          Brianna Cuellar RN  Community Care Coordinator    9/19/2019, 2:01 PM

## 2023-01-21 NOTE — SIGNIFICANT NOTE
Pt continues unable to attend to therapist for evaluation at this time. Please re-consult as pt may be able to participate and follow directions for evaluation. Thank you.    This RN went to wake patient up for assessment and patient would not answer any questions but would say what to name called and does move extremities to persistent commands. Patient is more lethargic. Pupils remain reactive. NP notified of increased lethargy and of RN unable to get any mental status assessment. Last ativan per [facility] was 1mg at 0000 on 1/21/23. No new orders.

## 2024-05-19 NOTE — PLAN OF CARE
Problem: Fall Risk (Adult)  Goal: Identify Related Risk Factors and Signs and Symptoms  Outcome: Ongoing (interventions implemented as appropriate)    Goal: Absence of Fall  Outcome: Ongoing (interventions implemented as appropriate)      Problem: Patient Care Overview  Goal: Individualization and Mutuality  Outcome: Ongoing (interventions implemented as appropriate)    Goal: Discharge Needs Assessment  Outcome: Ongoing (interventions implemented as appropriate)    Goal: Interprofessional Rounds/Family Conf  Outcome: Ongoing (interventions implemented as appropriate)      Problem: Skin Injury Risk (Adult)  Goal: Identify Related Risk Factors and Signs and Symptoms  Outcome: Ongoing (interventions implemented as appropriate)    Goal: Skin Health and Integrity  Outcome: Ongoing (interventions implemented as appropriate)      Problem: Pain, Acute (Adult)  Goal: Identify Related Risk Factors and Signs and Symptoms  Outcome: Ongoing (interventions implemented as appropriate)    Goal: Acceptable Pain Control/Comfort Level  Outcome: Ongoing (interventions implemented as appropriate)      Problem: Mobility, Physical Impaired (Adult)  Goal: Identify Related Risk Factors and Signs and Symptoms  Outcome: Ongoing (interventions implemented as appropriate)    Goal: Enhanced Mobility Skills  Outcome: Ongoing (interventions implemented as appropriate)    Goal: Enhanced Functional Ability  Outcome: Ongoing (interventions implemented as appropriate)        
Problem: Fall Risk (Adult)  Goal: Identify Related Risk Factors and Signs and Symptoms  Outcome: Ongoing (interventions implemented as appropriate)    Goal: Absence of Fall  Outcome: Ongoing (interventions implemented as appropriate)      Problem: Patient Care Overview  Goal: Plan of Care Review  Outcome: Ongoing (interventions implemented as appropriate)    Goal: Individualization and Mutuality  Outcome: Ongoing (interventions implemented as appropriate)    Goal: Discharge Needs Assessment  Outcome: Ongoing (interventions implemented as appropriate)    Goal: Interprofessional Rounds/Family Conf  Outcome: Ongoing (interventions implemented as appropriate)      Problem: Skin Injury Risk (Adult)  Goal: Identify Related Risk Factors and Signs and Symptoms  Outcome: Ongoing (interventions implemented as appropriate)    Goal: Skin Health and Integrity  Outcome: Ongoing (interventions implemented as appropriate)      Problem: Pain, Acute (Adult)  Goal: Identify Related Risk Factors and Signs and Symptoms  Outcome: Ongoing (interventions implemented as appropriate)    Goal: Acceptable Pain Control/Comfort Level  Outcome: Ongoing (interventions implemented as appropriate)      Problem: Mobility, Physical Impaired (Adult)  Goal: Identify Related Risk Factors and Signs and Symptoms  Outcome: Ongoing (interventions implemented as appropriate)    Goal: Enhanced Mobility Skills  Outcome: Ongoing (interventions implemented as appropriate)    Goal: Enhanced Functional Ability  Outcome: Ongoing (interventions implemented as appropriate)        
Problem: Fall Risk (Adult)  Goal: Identify Related Risk Factors and Signs and Symptoms  Outcome: Ongoing (interventions implemented as appropriate)    Goal: Absence of Fall  Outcome: Ongoing (interventions implemented as appropriate)      Problem: Skin Injury Risk (Adult)  Goal: Identify Related Risk Factors and Signs and Symptoms  Outcome: Ongoing (interventions implemented as appropriate)    Goal: Skin Health and Integrity  Outcome: Ongoing (interventions implemented as appropriate)      Problem: Pain, Acute (Adult)  Goal: Identify Related Risk Factors and Signs and Symptoms  Outcome: Ongoing (interventions implemented as appropriate)    Goal: Acceptable Pain Control/Comfort Level  Outcome: Ongoing (interventions implemented as appropriate)        
Problem: Patient Care Overview  Goal: Plan of Care Review  Outcome: Ongoing (interventions implemented as appropriate)    Goal: Individualization and Mutuality  Outcome: Ongoing (interventions implemented as appropriate)    Goal: Discharge Needs Assessment  Outcome: Ongoing (interventions implemented as appropriate)    Goal: Interprofessional Rounds/Family Conf  Outcome: Ongoing (interventions implemented as appropriate)      Problem: Skin Injury Risk (Adult)  Goal: Identify Related Risk Factors and Signs and Symptoms  Outcome: Ongoing (interventions implemented as appropriate)    Goal: Skin Health and Integrity  Outcome: Ongoing (interventions implemented as appropriate)      Problem: Pain, Acute (Adult)  Goal: Identify Related Risk Factors and Signs and Symptoms  Outcome: Ongoing (interventions implemented as appropriate)    Goal: Acceptable Pain Control/Comfort Level  Outcome: Ongoing (interventions implemented as appropriate)      Problem: Mobility, Physical Impaired (Adult)  Goal: Identify Related Risk Factors and Signs and Symptoms  Outcome: Ongoing (interventions implemented as appropriate)    Goal: Enhanced Mobility Skills  Outcome: Ongoing (interventions implemented as appropriate)    Goal: Enhanced Functional Ability  Outcome: Ongoing (interventions implemented as appropriate)        
Statement Selected

## (undated) DEVICE — Device

## (undated) DEVICE — Device: Brand: DEFENDO AIR/WATER/SUCTION AND BIOPSY VALVE

## (undated) DEVICE — FLEXIBLE ADHESIVE BANDAGE: Brand: CURITY

## (undated) DEVICE — BNDG ELAS CO-FLEX SLF ADHR 4IN5YD LF STRL

## (undated) DEVICE — DRSNG PAD ABD 8X10IN STRL

## (undated) DEVICE — PK FX TABL 70

## (undated) DEVICE — PAD GRND REM POLYHESIVE A/ DISP

## (undated) DEVICE — DRAPE,UTILTY,TAPE,15X26, 4EA/PK: Brand: MEDLINE

## (undated) DEVICE — PK BASIC 70

## (undated) DEVICE — DRILL, AO SMALL: Brand: GAMMA

## (undated) DEVICE — TRY SKINPREP PVP SCRB W PAINT

## (undated) DEVICE — INTENDED FOR TISSUE SEPARATION, AND OTHER PROCEDURES THAT REQUIRE A SHARP SURGICAL BLADE TO PUNCTURE OR CUT.: Brand: BARD-PARKER ® CARBON RIB-BACK BLADES

## (undated) DEVICE — PERCUTANEOUS ENDOSCOPIC GASTROSTOMY KIT: Brand: ENDOVIVE SAFETY PEG KIT

## (undated) DEVICE — GUIDE WIRE, BALL-TIPPED, STERILE

## (undated) DEVICE — DBD-DRAPE,LAP,CHOLE,W/TROUGHS,STERILE: Brand: MEDLINE

## (undated) DEVICE — DRSNG WND GZ CURAD OIL EMULSION 3X16IN LF STRL

## (undated) DEVICE — GLV SURG TRIUMPH ORTHO W/ALOE PF LTX 8.5 STRL

## (undated) DEVICE — SINGLE PORT MANIFOLD: Brand: NEPTUNE 2

## (undated) DEVICE — HOLDER: Brand: DEROYAL

## (undated) DEVICE — SPNG GZ WOVN 4X4IN 12PLY 10/BX STRL

## (undated) DEVICE — MAT FLR ABSORBENT LG 4FT 10 2.5FT

## (undated) DEVICE — ANTIBACTERIAL UNDYED BRAIDED (POLYGLACTIN 910), SYNTHETIC ABSORBABLE SUTURE: Brand: COATED VICRYL

## (undated) DEVICE — UNDERCAST PADDING: Brand: DEROYAL

## (undated) DEVICE — ENCORE® LATEX MICRO SIZE 7, STERILE LATEX POWDER-FREE SURGICAL GLOVE: Brand: ENCORE

## (undated) DEVICE — K-WIRE
Type: IMPLANTABLE DEVICE | Site: HIP | Status: NON-FUNCTIONAL
Removed: 2019-03-28

## (undated) DEVICE — TUBING, SUCTION, 1/4" X 20', STRAIGHT: Brand: MEDLINE INDUSTRIES, INC.